# Patient Record
Sex: MALE | Race: OTHER | NOT HISPANIC OR LATINO
[De-identification: names, ages, dates, MRNs, and addresses within clinical notes are randomized per-mention and may not be internally consistent; named-entity substitution may affect disease eponyms.]

---

## 2017-03-30 ENCOUNTER — APPOINTMENT (OUTPATIENT)
Dept: OPHTHALMOLOGY | Facility: CLINIC | Age: 82
End: 2017-03-30

## 2017-03-30 DIAGNOSIS — Z79.899 OTHER LONG TERM (CURRENT) DRUG THERAPY: ICD-10-CM

## 2017-07-03 ENCOUNTER — APPOINTMENT (OUTPATIENT)
Dept: UROLOGY | Facility: CLINIC | Age: 82
End: 2017-07-03

## 2017-07-24 ENCOUNTER — APPOINTMENT (OUTPATIENT)
Dept: UROLOGY | Facility: CLINIC | Age: 82
End: 2017-07-24

## 2017-07-24 RX ORDER — APIXABAN 2.5 MG/1
2.5 TABLET, FILM COATED ORAL
Qty: 60 | Refills: 0 | Status: ACTIVE | COMMUNITY
Start: 2016-09-08

## 2017-07-26 LAB
APPEARANCE: CLEAR
BACTERIA UR CULT: ABNORMAL
BACTERIA: NEGATIVE
BILIRUBIN URINE: NEGATIVE
BLOOD URINE: NEGATIVE
COLOR: YELLOW
CORE LAB FLUID CYTOLOGY: NORMAL
GLUCOSE QUALITATIVE U: NORMAL
HYALINE CASTS: 0 /LPF
KETONES URINE: NEGATIVE
LEUKOCYTE ESTERASE URINE: NEGATIVE
MICROSCOPIC-UA: NORMAL
NITRITE URINE: NEGATIVE
PH URINE: 7
PROTEIN URINE: NEGATIVE
PSA SERPL-MCNC: 0.75 NG/ML
RED BLOOD CELLS URINE: 0 /HPF
SPECIFIC GRAVITY URINE: 1.01
SQUAMOUS EPITHELIAL CELLS: 2 /HPF
TESTOST SERPL-MCNC: 271.5 NG/DL
UROBILINOGEN URINE: 1
WHITE BLOOD CELLS URINE: 1 /HPF

## 2017-08-03 ENCOUNTER — RX RENEWAL (OUTPATIENT)
Age: 82
End: 2017-08-03

## 2017-08-24 ENCOUNTER — MEDICATION RENEWAL (OUTPATIENT)
Age: 82
End: 2017-08-24

## 2017-08-24 ENCOUNTER — RX RENEWAL (OUTPATIENT)
Age: 82
End: 2017-08-24

## 2017-09-21 ENCOUNTER — APPOINTMENT (OUTPATIENT)
Dept: OPHTHALMOLOGY | Facility: CLINIC | Age: 82
End: 2017-09-21

## 2017-10-23 ENCOUNTER — APPOINTMENT (OUTPATIENT)
Dept: UROLOGY | Facility: CLINIC | Age: 82
End: 2017-10-23

## 2017-11-21 ENCOUNTER — APPOINTMENT (OUTPATIENT)
Dept: UROLOGY | Facility: CLINIC | Age: 82
End: 2017-11-21
Payer: MEDICARE

## 2017-11-21 VITALS
RESPIRATION RATE: 16 BRPM | WEIGHT: 165 LBS | HEIGHT: 72 IN | DIASTOLIC BLOOD PRESSURE: 76 MMHG | HEART RATE: 64 BPM | TEMPERATURE: 98 F | SYSTOLIC BLOOD PRESSURE: 124 MMHG | BODY MASS INDEX: 22.35 KG/M2

## 2017-11-21 DIAGNOSIS — Z87.440 PERSONAL HISTORY OF URINARY (TRACT) INFECTIONS: ICD-10-CM

## 2017-11-21 PROCEDURE — 99214 OFFICE O/P EST MOD 30 MIN: CPT

## 2017-11-21 RX ORDER — PREDNISONE 1 MG/1
1 TABLET ORAL DAILY
Qty: 270 | Refills: 3 | Status: ACTIVE | COMMUNITY
Start: 2017-11-21

## 2017-11-24 LAB
APPEARANCE: ABNORMAL
BACTERIA UR CULT: NORMAL
BACTERIA: NEGATIVE
BILIRUBIN URINE: NEGATIVE
BLOOD URINE: NEGATIVE
COLOR: YELLOW
CORE LAB FLUID CYTOLOGY: NORMAL
GLUCOSE QUALITATIVE U: NEGATIVE MG/DL
HYALINE CASTS: 0 /LPF
KETONES URINE: NEGATIVE
LEUKOCYTE ESTERASE URINE: NEGATIVE
MICROSCOPIC-UA: NORMAL
NITRITE URINE: NEGATIVE
PH URINE: 6
PROTEIN URINE: NEGATIVE MG/DL
PSA SERPL-MCNC: 0.77 NG/ML
RED BLOOD CELLS URINE: 0 /HPF
SPECIFIC GRAVITY URINE: 1.02
SQUAMOUS EPITHELIAL CELLS: 1 /HPF
TESTOST SERPL-MCNC: 243.4 NG/DL
UROBILINOGEN URINE: 1 MG/DL
WHITE BLOOD CELLS URINE: 0 /HPF

## 2017-11-28 LAB
BASOPHILS # BLD AUTO: 0.02 K/UL
BASOPHILS NFR BLD AUTO: 0.3 %
EOSINOPHIL # BLD AUTO: 0.24 K/UL
EOSINOPHIL NFR BLD AUTO: 3.9 %
HCT VFR BLD CALC: 30.1 %
HGB BLD-MCNC: 10 G/DL
IMM GRANULOCYTES NFR BLD AUTO: 0.5 %
LYMPHOCYTES # BLD AUTO: 0.88 K/UL
LYMPHOCYTES NFR BLD AUTO: 14.2 %
MAN DIFF?: NORMAL
MCHC RBC-ENTMCNC: 29.9 PG
MCHC RBC-ENTMCNC: 33.2 GM/DL
MCV RBC AUTO: 90.1 FL
MONOCYTES # BLD AUTO: 0.66 K/UL
MONOCYTES NFR BLD AUTO: 10.7 %
NEUTROPHILS # BLD AUTO: 4.36 K/UL
NEUTROPHILS NFR BLD AUTO: 70.4 %
PLATELET # BLD AUTO: 201 K/UL
RBC # BLD: 3.34 M/UL
RBC # FLD: 14.9 %
WBC # FLD AUTO: 6.19 K/UL

## 2017-12-05 LAB
ALBUMIN SERPL ELPH-MCNC: 4 G/DL
ALP BLD-CCNC: 46 U/L
ALT SERPL-CCNC: 8 U/L
ANION GAP SERPL CALC-SCNC: 13 MMOL/L
AST SERPL-CCNC: 11 U/L
BILIRUB SERPL-MCNC: 0.6 MG/DL
BUN SERPL-MCNC: 21 MG/DL
CALCIUM SERPL-MCNC: 8.8 MG/DL
CHLORIDE SERPL-SCNC: 95 MMOL/L
CO2 SERPL-SCNC: 28 MMOL/L
CREAT SERPL-MCNC: 0.98 MG/DL
GLUCOSE SERPL-MCNC: 129 MG/DL
POTASSIUM SERPL-SCNC: 5 MMOL/L
PROT SERPL-MCNC: 6.4 G/DL
SODIUM SERPL-SCNC: 136 MMOL/L

## 2018-04-02 ENCOUNTER — LABORATORY RESULT (OUTPATIENT)
Age: 83
End: 2018-04-02

## 2018-04-02 ENCOUNTER — APPOINTMENT (OUTPATIENT)
Dept: NEPHROLOGY | Facility: CLINIC | Age: 83
End: 2018-04-02
Payer: MEDICARE

## 2018-04-02 VITALS
HEIGHT: 72 IN | SYSTOLIC BLOOD PRESSURE: 121 MMHG | BODY MASS INDEX: 23.3 KG/M2 | HEART RATE: 67 BPM | OXYGEN SATURATION: 98 % | DIASTOLIC BLOOD PRESSURE: 70 MMHG | WEIGHT: 172 LBS

## 2018-04-02 DIAGNOSIS — I10 ESSENTIAL (PRIMARY) HYPERTENSION: ICD-10-CM

## 2018-04-02 PROCEDURE — 99204 OFFICE O/P NEW MOD 45 MIN: CPT

## 2018-04-02 RX ORDER — LISINOPRIL 20 MG/1
20 TABLET ORAL
Qty: 90 | Refills: 0 | Status: ACTIVE | COMMUNITY
Start: 2017-11-17

## 2018-04-04 LAB
ALBUMIN SERPL ELPH-MCNC: 4.3 G/DL
ANA SER IF-ACNC: NEGATIVE
ANION GAP SERPL CALC-SCNC: 14 MMOL/L
APPEARANCE: CLEAR
BACTERIA: NEGATIVE
BASOPHILS # BLD AUTO: 0.01 K/UL
BASOPHILS NFR BLD AUTO: 0.2 %
BILIRUBIN URINE: NEGATIVE
BLOOD URINE: NEGATIVE
BUN SERPL-MCNC: 25 MG/DL
C3 SERPL-MCNC: 93 MG/DL
CALCIUM SERPL-MCNC: 9.2 MG/DL
CHLORIDE SERPL-SCNC: 97 MMOL/L
CO2 SERPL-SCNC: 25 MMOL/L
COLOR: YELLOW
CREAT SERPL-MCNC: 1.17 MG/DL
CREAT SPEC-SCNC: 59 MG/DL
CREAT/PROT UR: 0.1 RATIO
DEPRECATED KAPPA LC FREE/LAMBDA SER: 1.92 RATIO
DSDNA AB SER-ACNC: <12 IU/ML
EOSINOPHIL # BLD AUTO: 0.41 K/UL
EOSINOPHIL NFR BLD AUTO: 8.6 %
GLUCOSE QUALITATIVE U: NEGATIVE MG/DL
GLUCOSE SERPL-MCNC: 149 MG/DL
HBV SURFACE AB SER QL: REACTIVE
HBV SURFACE AG SER QL: NONREACTIVE
HCT VFR BLD CALC: 29.8 %
HCV AB SER QL: NONREACTIVE
HCV S/CO RATIO: 0.07 S/CO
HGB BLD-MCNC: 10 G/DL
HYALINE CASTS: 2 /LPF
IGA 24H UR QL IFE: NORMAL
IGA SER QL IEP: 109 MG/DL
IGG SER QL IEP: 561 MG/DL
IGM SER QL IEP: 84 MG/DL
IMM GRANULOCYTES NFR BLD AUTO: 0.4 %
KAPPA LC CSF-MCNC: 1.24 MG/DL
KAPPA LC SERPL-MCNC: 2.38 MG/DL
KETONES URINE: NEGATIVE
LEUKOCYTE ESTERASE URINE: NEGATIVE
LYMPHOCYTES # BLD AUTO: 1.32 K/UL
LYMPHOCYTES NFR BLD AUTO: 27.8 %
M PROTEIN SPEC IFE-MCNC: NORMAL
MAN DIFF?: NORMAL
MCHC RBC-ENTMCNC: 29.9 PG
MCHC RBC-ENTMCNC: 33.6 GM/DL
MCV RBC AUTO: 89 FL
MICROSCOPIC-UA: NORMAL
MONOCYTES # BLD AUTO: 0.54 K/UL
MONOCYTES NFR BLD AUTO: 11.4 %
MPO AB + PR3 PNL SER: NORMAL
NEUTROPHILS # BLD AUTO: 2.45 K/UL
NEUTROPHILS NFR BLD AUTO: 51.6 %
NITRITE URINE: NEGATIVE
PH URINE: 6
PHOSPHATE SERPL-MCNC: 3.8 MG/DL
PLATELET # BLD AUTO: 126 K/UL
POTASSIUM SERPL-SCNC: 5 MMOL/L
PROT UR-MCNC: 6 MG/DL
PROTEIN URINE: NEGATIVE MG/DL
RBC # BLD: 3.35 M/UL
RBC # FLD: 14.2 %
RED BLOOD CELLS URINE: 7 /HPF
SODIUM SERPL-SCNC: 136 MMOL/L
SPECIFIC GRAVITY URINE: 1.01
SQUAMOUS EPITHELIAL CELLS: 1 /HPF
TRIPLE PHOSPHATE CRYSTALS: ABNORMAL
UROBILINOGEN URINE: 1 MG/DL
WBC # FLD AUTO: 4.75 K/UL
WHITE BLOOD CELLS URINE: 1 /HPF

## 2018-04-12 ENCOUNTER — APPOINTMENT (OUTPATIENT)
Dept: UROLOGY | Facility: CLINIC | Age: 83
End: 2018-04-12

## 2018-05-03 ENCOUNTER — APPOINTMENT (OUTPATIENT)
Dept: NEPHROLOGY | Facility: CLINIC | Age: 83
End: 2018-05-03

## 2018-05-21 ENCOUNTER — APPOINTMENT (OUTPATIENT)
Dept: UROLOGY | Facility: CLINIC | Age: 83
End: 2018-05-21

## 2018-05-23 ENCOUNTER — APPOINTMENT (OUTPATIENT)
Dept: UROLOGY | Facility: CLINIC | Age: 83
End: 2018-05-23
Payer: MEDICARE

## 2018-05-23 PROCEDURE — 99214 OFFICE O/P EST MOD 30 MIN: CPT

## 2018-05-26 LAB
24R-OH-CALCIDIOL SERPL-MCNC: 61.4 PG/ML
25(OH)D3 SERPL-MCNC: 26.3 NG/ML
ALBUMIN SERPL ELPH-MCNC: 3.9 G/DL
ALP BLD-CCNC: 53 U/L
ALT SERPL-CCNC: 16 U/L
ANION GAP SERPL CALC-SCNC: 15 MMOL/L
APPEARANCE: CLEAR
AST SERPL-CCNC: 16 U/L
BACTERIA UR CULT: NORMAL
BACTERIA: NEGATIVE
BASOPHILS # BLD AUTO: 0.02 K/UL
BASOPHILS NFR BLD AUTO: 0.4 %
BILIRUB SERPL-MCNC: 0.5 MG/DL
BILIRUBIN URINE: NEGATIVE
BLOOD URINE: NEGATIVE
BUN SERPL-MCNC: 22 MG/DL
CALCIUM SERPL-MCNC: 8.5 MG/DL
CHLORIDE SERPL-SCNC: 90 MMOL/L
CO2 SERPL-SCNC: 23 MMOL/L
COLOR: YELLOW
CORE LAB FLUID CYTOLOGY: NORMAL
CREAT SERPL-MCNC: 1.63 MG/DL
EOSINOPHIL # BLD AUTO: 0.52 K/UL
EOSINOPHIL NFR BLD AUTO: 11.7 %
GLUCOSE QUALITATIVE U: NEGATIVE MG/DL
GLUCOSE SERPL-MCNC: 228 MG/DL
HBA1C MFR BLD HPLC: 6.2 %
HCT VFR BLD CALC: 28.1 %
HGB BLD-MCNC: 9.8 G/DL
HYALINE CASTS: 0 /LPF
IMM GRANULOCYTES NFR BLD AUTO: 0.4 %
KETONES URINE: NEGATIVE
LEUKOCYTE ESTERASE URINE: NEGATIVE
LYMPHOCYTES # BLD AUTO: 0.71 K/UL
LYMPHOCYTES NFR BLD AUTO: 16 %
MAN DIFF?: NORMAL
MCHC RBC-ENTMCNC: 30 PG
MCHC RBC-ENTMCNC: 34.9 GM/DL
MCV RBC AUTO: 85.9 FL
MICROSCOPIC-UA: NORMAL
MONOCYTES # BLD AUTO: 0.68 K/UL
MONOCYTES NFR BLD AUTO: 15.3 %
NEUTROPHILS # BLD AUTO: 2.5 K/UL
NEUTROPHILS NFR BLD AUTO: 56.2 %
NITRITE URINE: NEGATIVE
PH URINE: 6.5
PLATELET # BLD AUTO: 150 K/UL
POTASSIUM SERPL-SCNC: 4.5 MMOL/L
PROT SERPL-MCNC: 5.8 G/DL
PROTEIN URINE: NEGATIVE MG/DL
PSA SERPL-MCNC: 1.13 NG/ML
RBC # BLD: 3.27 M/UL
RBC # FLD: 14 %
RED BLOOD CELLS URINE: 1 /HPF
SODIUM SERPL-SCNC: 128 MMOL/L
SPECIFIC GRAVITY URINE: 1.01
SQUAMOUS EPITHELIAL CELLS: 1 /HPF
TESTOST SERPL-MCNC: 164.5 NG/DL
UROBILINOGEN URINE: 1 MG/DL
WBC # FLD AUTO: 4.45 K/UL
WHITE BLOOD CELLS URINE: 0 /HPF

## 2018-06-06 ENCOUNTER — APPOINTMENT (OUTPATIENT)
Dept: UROLOGY | Facility: CLINIC | Age: 83
End: 2018-06-06
Payer: MEDICARE

## 2018-06-06 DIAGNOSIS — E55.9 VITAMIN D DEFICIENCY, UNSPECIFIED: ICD-10-CM

## 2018-06-06 PROCEDURE — 99214 OFFICE O/P EST MOD 30 MIN: CPT

## 2018-06-06 RX ORDER — SILODOSIN 8 MG/1
8 CAPSULE ORAL DAILY
Qty: 90 | Refills: 3 | Status: COMPLETED | COMMUNITY
Start: 2017-07-03 | End: 2018-06-06

## 2018-06-09 LAB
APPEARANCE: CLEAR
BACTERIA UR CULT: ABNORMAL
BACTERIA: NEGATIVE
BILIRUBIN URINE: NEGATIVE
BLOOD URINE: NEGATIVE
CALCIUM OXALATE CRYSTALS: NEGATIVE
COLOR: YELLOW
CORE LAB FLUID CYTOLOGY: NORMAL
GLUCOSE QUALITATIVE U: NEGATIVE MG/DL
GRANULAR CASTS: 0 /LPF
HYALINE CASTS: 0 /LPF
KETONES URINE: NEGATIVE
LEUKOCYTE ESTERASE URINE: NEGATIVE
MICROSCOPIC-UA: NORMAL
NITRITE URINE: NEGATIVE
PH URINE: 7
PROTEIN URINE: NEGATIVE MG/DL
RED BLOOD CELLS URINE: 0 /HPF
SPECIFIC GRAVITY URINE: 1.01
SQUAMOUS EPITHELIAL CELLS: 1 /HPF
TRIPLE PHOSPHATE CRYSTALS: NEGATIVE
URIC ACID CRYSTALS: NEGATIVE
UROBILINOGEN URINE: 1 MG/DL
WHITE BLOOD CELLS URINE: 1 /HPF

## 2018-06-26 ENCOUNTER — APPOINTMENT (OUTPATIENT)
Dept: UROLOGY | Facility: CLINIC | Age: 83
End: 2018-06-26

## 2018-07-03 ENCOUNTER — APPOINTMENT (OUTPATIENT)
Dept: UROLOGY | Facility: CLINIC | Age: 83
End: 2018-07-03
Payer: MEDICARE

## 2018-07-03 PROCEDURE — 99214 OFFICE O/P EST MOD 30 MIN: CPT

## 2018-07-03 RX ORDER — ALFUZOSIN HYDROCHLORIDE 10 MG/1
10 TABLET, EXTENDED RELEASE ORAL
Qty: 30 | Refills: 11 | Status: COMPLETED | COMMUNITY
Start: 2018-05-23 | End: 2018-07-03

## 2018-07-04 LAB
APPEARANCE: CLEAR
BACTERIA: NEGATIVE
BILIRUBIN URINE: NEGATIVE
BLOOD URINE: NEGATIVE
COLOR: YELLOW
GLUCOSE QUALITATIVE U: NEGATIVE MG/DL
HYALINE CASTS: 0 /LPF
KETONES URINE: NEGATIVE
LEUKOCYTE ESTERASE URINE: NEGATIVE
MICROSCOPIC-UA: NORMAL
NITRITE URINE: NEGATIVE
PH URINE: 6
PROTEIN URINE: NEGATIVE MG/DL
RED BLOOD CELLS URINE: 0 /HPF
SPECIFIC GRAVITY URINE: 1.02
SQUAMOUS EPITHELIAL CELLS: 2 /HPF
UROBILINOGEN URINE: 1 MG/DL
WHITE BLOOD CELLS URINE: 1 /HPF

## 2018-07-06 LAB
BACTERIA UR CULT: NORMAL
CORE LAB FLUID CYTOLOGY: NORMAL

## 2018-07-31 ENCOUNTER — APPOINTMENT (OUTPATIENT)
Dept: UROLOGY | Facility: CLINIC | Age: 83
End: 2018-07-31
Payer: MEDICARE

## 2018-07-31 VITALS
HEIGHT: 72 IN | HEART RATE: 69 BPM | RESPIRATION RATE: 15 BRPM | WEIGHT: 175 LBS | DIASTOLIC BLOOD PRESSURE: 56 MMHG | BODY MASS INDEX: 23.7 KG/M2 | TEMPERATURE: 97.6 F | SYSTOLIC BLOOD PRESSURE: 124 MMHG

## 2018-07-31 PROCEDURE — 99214 OFFICE O/P EST MOD 30 MIN: CPT

## 2018-08-05 LAB
ALBUMIN SERPL ELPH-MCNC: 4.1 G/DL
ALP BLD-CCNC: 57 U/L
ALT SERPL-CCNC: 18 U/L
ANION GAP SERPL CALC-SCNC: 12 MMOL/L
APPEARANCE: CLEAR
AST SERPL-CCNC: 15 U/L
BACTERIA UR CULT: ABNORMAL
BACTERIA: NEGATIVE
BASOPHILS # BLD AUTO: 0.02 K/UL
BASOPHILS NFR BLD AUTO: 0.4 %
BILIRUB SERPL-MCNC: 0.4 MG/DL
BILIRUBIN URINE: NEGATIVE
BLOOD URINE: NEGATIVE
BUN SERPL-MCNC: 28 MG/DL
CALCIUM SERPL-MCNC: 8.8 MG/DL
CHLORIDE SERPL-SCNC: 95 MMOL/L
CO2 SERPL-SCNC: 27 MMOL/L
COLOR: YELLOW
CORE LAB FLUID CYTOLOGY: NORMAL
CREAT SERPL-MCNC: 1.36 MG/DL
EOSINOPHIL # BLD AUTO: 0.66 K/UL
EOSINOPHIL NFR BLD AUTO: 14 %
GLUCOSE QUALITATIVE U: NEGATIVE MG/DL
GLUCOSE SERPL-MCNC: 138 MG/DL
HCT VFR BLD CALC: 31.8 %
HGB BLD-MCNC: 10.5 G/DL
HYALINE CASTS: 0 /LPF
IMM GRANULOCYTES NFR BLD AUTO: 0.2 %
KETONES URINE: NEGATIVE
LEUKOCYTE ESTERASE URINE: NEGATIVE
LYMPHOCYTES # BLD AUTO: 1.49 K/UL
LYMPHOCYTES NFR BLD AUTO: 31.7 %
MAN DIFF?: NORMAL
MCHC RBC-ENTMCNC: 28.9 PG
MCHC RBC-ENTMCNC: 33 GM/DL
MCV RBC AUTO: 87.6 FL
MICROSCOPIC-UA: NORMAL
MONOCYTES # BLD AUTO: 0.54 K/UL
MONOCYTES NFR BLD AUTO: 11.5 %
NEUTROPHILS # BLD AUTO: 1.98 K/UL
NEUTROPHILS NFR BLD AUTO: 42.2 %
NITRITE URINE: NEGATIVE
PH URINE: 6
PLATELET # BLD AUTO: 140 K/UL
POTASSIUM SERPL-SCNC: 4.8 MMOL/L
PROT SERPL-MCNC: 5.7 G/DL
PROTEIN URINE: NEGATIVE MG/DL
PSA SERPL-MCNC: 0.49 NG/ML
RBC # BLD: 3.63 M/UL
RBC # FLD: 14.4 %
RED BLOOD CELLS URINE: 1 /HPF
SODIUM SERPL-SCNC: 134 MMOL/L
SPECIFIC GRAVITY URINE: 1.01
SQUAMOUS EPITHELIAL CELLS: 2 /HPF
TESTOST SERPL-MCNC: 156.5 NG/DL
UROBILINOGEN URINE: 1 MG/DL
WBC # FLD AUTO: 4.7 K/UL
WHITE BLOOD CELLS URINE: 0 /HPF

## 2018-08-13 ENCOUNTER — FORM ENCOUNTER (OUTPATIENT)
Age: 83
End: 2018-08-13

## 2018-08-14 ENCOUNTER — APPOINTMENT (OUTPATIENT)
Dept: ULTRASOUND IMAGING | Facility: IMAGING CENTER | Age: 83
End: 2018-08-14
Payer: MEDICARE

## 2018-08-14 ENCOUNTER — APPOINTMENT (OUTPATIENT)
Dept: UROLOGY | Facility: CLINIC | Age: 83
End: 2018-08-14
Payer: MEDICARE

## 2018-08-14 ENCOUNTER — OUTPATIENT (OUTPATIENT)
Dept: OUTPATIENT SERVICES | Facility: HOSPITAL | Age: 83
LOS: 1 days | End: 2018-08-14
Payer: MEDICARE
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
## 2018-08-14 DIAGNOSIS — N28.9 DISORDER OF KIDNEY AND URETER, UNSPECIFIED: ICD-10-CM

## 2018-08-14 PROCEDURE — 99214 OFFICE O/P EST MOD 30 MIN: CPT

## 2018-08-14 PROCEDURE — 76770 US EXAM ABDO BACK WALL COMP: CPT

## 2018-08-14 PROCEDURE — 76770 US EXAM ABDO BACK WALL COMP: CPT | Mod: 26

## 2018-09-03 ENCOUNTER — MOBILE ON CALL (OUTPATIENT)
Age: 83
End: 2018-09-03

## 2018-09-03 LAB
ANION GAP SERPL CALC-SCNC: 15 MMOL/L
APPEARANCE: CLEAR
BACTERIA UR CULT: ABNORMAL
BACTERIA: NEGATIVE
BILIRUBIN URINE: NEGATIVE
BLOOD URINE: NEGATIVE
BUN SERPL-MCNC: 30 MG/DL
CALCIUM SERPL-MCNC: 8.9 MG/DL
CHLORIDE SERPL-SCNC: 92 MMOL/L
CO2 SERPL-SCNC: 25 MMOL/L
COLOR: YELLOW
CORE LAB FLUID CYTOLOGY: NORMAL
CREAT SERPL-MCNC: 1.29 MG/DL
GLUCOSE QUALITATIVE U: NEGATIVE MG/DL
GLUCOSE SERPL-MCNC: 164 MG/DL
HYALINE CASTS: 0 /LPF
KETONES URINE: NEGATIVE
LEUKOCYTE ESTERASE URINE: NEGATIVE
MICROSCOPIC-UA: NORMAL
NITRITE URINE: NEGATIVE
OSMOLALITY SERPL: 282 MOS/KG
PH URINE: 6
POTASSIUM SERPL-SCNC: 4.5 MMOL/L
PROTEIN URINE: NEGATIVE MG/DL
RED BLOOD CELLS URINE: 4 /HPF
SODIUM SERPL-SCNC: 132 MMOL/L
SPECIFIC GRAVITY URINE: 1.01
SQUAMOUS EPITHELIAL CELLS: 0 /HPF
UROBILINOGEN URINE: 1 MG/DL
WHITE BLOOD CELLS URINE: 0 /HPF

## 2018-11-05 ENCOUNTER — APPOINTMENT (OUTPATIENT)
Dept: UROLOGY | Facility: CLINIC | Age: 83
End: 2018-11-05

## 2018-12-24 ENCOUNTER — APPOINTMENT (OUTPATIENT)
Dept: UROLOGY | Facility: CLINIC | Age: 83
End: 2018-12-24
Payer: MEDICARE

## 2018-12-24 DIAGNOSIS — R39.9 UNSPECIFIED SYMPTOMS AND SIGNS INVOLVING THE GENITOURINARY SYSTEM: ICD-10-CM

## 2018-12-24 LAB
ALBUMIN SERPL ELPH-MCNC: 3.8 G/DL
ALP BLD-CCNC: 87 U/L
ALT SERPL-CCNC: 21 U/L
ANION GAP SERPL CALC-SCNC: 12 MMOL/L
APPEARANCE: CLEAR
AST SERPL-CCNC: 15 U/L
BACTERIA: NEGATIVE
BILIRUB SERPL-MCNC: 0.5 MG/DL
BILIRUBIN URINE: NEGATIVE
BLOOD URINE: NEGATIVE
BUN SERPL-MCNC: 28 MG/DL
CALCIUM SERPL-MCNC: 9 MG/DL
CHLORIDE SERPL-SCNC: 96 MMOL/L
CO2 SERPL-SCNC: 28 MMOL/L
COLOR: YELLOW
CREAT SERPL-MCNC: 1.48 MG/DL
GLUCOSE QUALITATIVE U: NEGATIVE MG/DL
GLUCOSE SERPL-MCNC: 218 MG/DL
HBA1C MFR BLD HPLC: 6.9 %
KETONES URINE: NEGATIVE
LEUKOCYTE ESTERASE URINE: NEGATIVE
MICROSCOPIC-UA: NORMAL
NITRITE URINE: NEGATIVE
PH URINE: 5.5
POTASSIUM SERPL-SCNC: 4 MMOL/L
PROT SERPL-MCNC: 5.3 G/DL
PROTEIN URINE: NEGATIVE MG/DL
RED BLOOD CELLS URINE: 6 /HPF
SODIUM SERPL-SCNC: 136 MMOL/L
SPECIFIC GRAVITY URINE: 1.01
SQUAMOUS EPITHELIAL CELLS: 1 /HPF
UROBILINOGEN URINE: NEGATIVE MG/DL
WHITE BLOOD CELLS URINE: 1 /HPF

## 2018-12-24 PROCEDURE — 99214 OFFICE O/P EST MOD 30 MIN: CPT

## 2018-12-24 RX ORDER — MIRABEGRON 25 MG/1
25 TABLET, FILM COATED, EXTENDED RELEASE ORAL
Qty: 30 | Refills: 11 | Status: COMPLETED | COMMUNITY
Start: 2018-06-13 | End: 2018-12-24

## 2018-12-24 RX ORDER — GLIPIZIDE 5 MG/1
5 TABLET ORAL TWICE DAILY
Refills: 0 | Status: ACTIVE | COMMUNITY
Start: 2016-08-19

## 2018-12-24 RX ORDER — TROSPIUM CHLORIDE 20 MG/1
20 TABLET, FILM COATED ORAL
Qty: 30 | Refills: 11 | Status: COMPLETED | COMMUNITY
Start: 2018-06-06 | End: 2018-12-24

## 2018-12-27 LAB
BACTERIA UR CULT: ABNORMAL
BASOPHILS # BLD AUTO: 0.02 K/UL
BASOPHILS NFR BLD AUTO: 0.4 %
EOSINOPHIL # BLD AUTO: 0.71 K/UL
EOSINOPHIL NFR BLD AUTO: 14.5 %
HCT VFR BLD CALC: 32.3 %
HGB BLD-MCNC: 10.1 G/DL
IMM GRANULOCYTES NFR BLD AUTO: 1 %
LYMPHOCYTES # BLD AUTO: 1.87 K/UL
LYMPHOCYTES NFR BLD AUTO: 38.1 %
MAN DIFF?: NORMAL
MCHC RBC-ENTMCNC: 28.9 PG
MCHC RBC-ENTMCNC: 31.3 GM/DL
MCV RBC AUTO: 92.6 FL
MONOCYTES # BLD AUTO: 0.86 K/UL
MONOCYTES NFR BLD AUTO: 17.5 %
NEUTROPHILS # BLD AUTO: 1.4 K/UL
NEUTROPHILS NFR BLD AUTO: 28.5 %
PLATELET # BLD AUTO: 119 K/UL
RBC # BLD: 3.49 M/UL
RBC # FLD: 15.7 %
WBC # FLD AUTO: 4.91 K/UL

## 2018-12-31 LAB — BACTERIA UR CULT: NORMAL

## 2019-01-13 LAB
APPEARANCE: CLEAR
BACTERIA: NEGATIVE
BILIRUBIN URINE: NEGATIVE
BLOOD URINE: NEGATIVE
CALCIUM OXALATE CRYSTALS: NEGATIVE
COLOR: YELLOW
GLUCOSE QUALITATIVE U: NEGATIVE MG/DL
GRANULAR CASTS: 0 /LPF
HYALINE CASTS: 0 /LPF
KETONES URINE: NEGATIVE
LEUKOCYTE ESTERASE URINE: NEGATIVE
MICROSCOPIC-UA: NORMAL
NITRITE URINE: NEGATIVE
PH URINE: 5.5
PROTEIN URINE: NEGATIVE MG/DL
RED BLOOD CELLS URINE: 0 /HPF
SPECIFIC GRAVITY URINE: 1.01
SQUAMOUS EPITHELIAL CELLS: 0 /HPF
TRIPLE PHOSPHATE CRYSTALS: NEGATIVE
URIC ACID CRYSTALS: NEGATIVE
UROBILINOGEN URINE: 1 MG/DL
WHITE BLOOD CELLS URINE: 0 /HPF

## 2019-05-21 ENCOUNTER — APPOINTMENT (OUTPATIENT)
Dept: UROLOGY | Facility: CLINIC | Age: 84
End: 2019-05-21

## 2019-06-02 ENCOUNTER — INPATIENT (INPATIENT)
Facility: HOSPITAL | Age: 84
LOS: 3 days | Discharge: HOME | DRG: 291 | End: 2019-06-06
Attending: INTERNAL MEDICINE | Admitting: INTERNAL MEDICINE
Payer: MEDICARE

## 2019-06-02 VITALS
DIASTOLIC BLOOD PRESSURE: 73 MMHG | RESPIRATION RATE: 20 BRPM | SYSTOLIC BLOOD PRESSURE: 118 MMHG | TEMPERATURE: 99 F | WEIGHT: 175.05 LBS | OXYGEN SATURATION: 96 % | HEART RATE: 60 BPM

## 2019-06-02 DIAGNOSIS — I50.9 HEART FAILURE, UNSPECIFIED: ICD-10-CM

## 2019-06-02 LAB
ALBUMIN SERPL ELPH-MCNC: 4.1 G/DL — SIGNIFICANT CHANGE UP (ref 3.3–5)
ALP SERPL-CCNC: 97 U/L — SIGNIFICANT CHANGE UP (ref 40–120)
ALT FLD-CCNC: 29 U/L — SIGNIFICANT CHANGE UP (ref 10–45)
ANION GAP SERPL CALC-SCNC: 13 MMOL/L — SIGNIFICANT CHANGE UP (ref 5–17)
APTT BLD: 32 SEC — SIGNIFICANT CHANGE UP (ref 27.5–36.3)
AST SERPL-CCNC: 26 U/L — SIGNIFICANT CHANGE UP (ref 10–40)
BASOPHILS # BLD AUTO: 0 K/UL — SIGNIFICANT CHANGE UP (ref 0–0.2)
BASOPHILS NFR BLD AUTO: 0 % — SIGNIFICANT CHANGE UP (ref 0–2)
BILIRUB SERPL-MCNC: 0.5 MG/DL — SIGNIFICANT CHANGE UP (ref 0.2–1.2)
BUN SERPL-MCNC: 23 MG/DL — SIGNIFICANT CHANGE UP (ref 7–23)
CALCIUM SERPL-MCNC: 9 MG/DL — SIGNIFICANT CHANGE UP (ref 8.4–10.5)
CHLORIDE SERPL-SCNC: 94 MMOL/L — LOW (ref 96–108)
CO2 SERPL-SCNC: 26 MMOL/L — SIGNIFICANT CHANGE UP (ref 22–31)
CREAT SERPL-MCNC: 1.25 MG/DL — SIGNIFICANT CHANGE UP (ref 0.5–1.3)
EOSINOPHIL # BLD AUTO: 0 K/UL — SIGNIFICANT CHANGE UP (ref 0–0.5)
EOSINOPHIL NFR BLD AUTO: 0.3 % — SIGNIFICANT CHANGE UP (ref 0–6)
GLUCOSE BLDC GLUCOMTR-MCNC: 311 MG/DL — HIGH (ref 70–99)
GLUCOSE SERPL-MCNC: 249 MG/DL — HIGH (ref 70–99)
HCT VFR BLD CALC: 28.5 % — LOW (ref 39–50)
HGB BLD-MCNC: 10.1 G/DL — LOW (ref 13–17)
INR BLD: 1.09 RATIO — SIGNIFICANT CHANGE UP (ref 0.88–1.16)
LDH SERPL L TO P-CCNC: 277 U/L — HIGH (ref 50–242)
LYMPHOCYTES # BLD AUTO: 0.4 K/UL — LOW (ref 1–3.3)
LYMPHOCYTES # BLD AUTO: 3.6 % — LOW (ref 13–44)
MAGNESIUM SERPL-MCNC: 1.3 MG/DL — LOW (ref 1.6–2.6)
MCHC RBC-ENTMCNC: 32.5 PG — SIGNIFICANT CHANGE UP (ref 27–34)
MCHC RBC-ENTMCNC: 35.6 GM/DL — SIGNIFICANT CHANGE UP (ref 32–36)
MCV RBC AUTO: 91.3 FL — SIGNIFICANT CHANGE UP (ref 80–100)
MONOCYTES # BLD AUTO: 0.7 K/UL — SIGNIFICANT CHANGE UP (ref 0–0.9)
MONOCYTES NFR BLD AUTO: 5.4 % — SIGNIFICANT CHANGE UP (ref 2–14)
NEUTROPHILS # BLD AUTO: 11 K/UL — HIGH (ref 1.8–7.4)
NEUTROPHILS NFR BLD AUTO: 90.6 % — HIGH (ref 43–77)
NT-PROBNP SERPL-SCNC: 3762 PG/ML — HIGH (ref 0–300)
PHOSPHATE SERPL-MCNC: 3.1 MG/DL — SIGNIFICANT CHANGE UP (ref 2.5–4.5)
PLATELET # BLD AUTO: 100 K/UL — LOW (ref 150–400)
POTASSIUM SERPL-MCNC: 4.1 MMOL/L — SIGNIFICANT CHANGE UP (ref 3.5–5.3)
POTASSIUM SERPL-SCNC: 4.1 MMOL/L — SIGNIFICANT CHANGE UP (ref 3.5–5.3)
PROT SERPL-MCNC: 6.2 G/DL — SIGNIFICANT CHANGE UP (ref 6–8.3)
PROTHROM AB SERPL-ACNC: 12.6 SEC — SIGNIFICANT CHANGE UP (ref 10–12.9)
RBC # BLD: 3.12 M/UL — LOW (ref 4.2–5.8)
RBC # FLD: 17.5 % — HIGH (ref 10.3–14.5)
SODIUM SERPL-SCNC: 133 MMOL/L — LOW (ref 135–145)
TROPONIN T, HIGH SENSITIVITY RESULT: 22 NG/L — SIGNIFICANT CHANGE UP (ref 0–51)
URATE SERPL-MCNC: 5.8 MG/DL — SIGNIFICANT CHANGE UP (ref 3.4–8.8)
WBC # BLD: 12.1 K/UL — HIGH (ref 3.8–10.5)
WBC # FLD AUTO: 12.1 K/UL — HIGH (ref 3.8–10.5)

## 2019-06-02 PROCEDURE — 71250 CT THORAX DX C-: CPT | Mod: 26

## 2019-06-02 PROCEDURE — 99285 EMERGENCY DEPT VISIT HI MDM: CPT

## 2019-06-02 PROCEDURE — 71045 X-RAY EXAM CHEST 1 VIEW: CPT | Mod: 26

## 2019-06-02 RX ORDER — METOPROLOL TARTRATE 50 MG
25 TABLET ORAL ONCE
Refills: 0 | Status: COMPLETED | OUTPATIENT
Start: 2019-06-02 | End: 2019-06-02

## 2019-06-02 RX ORDER — DEXTROSE 50 % IN WATER 50 %
25 SYRINGE (ML) INTRAVENOUS ONCE
Refills: 0 | Status: DISCONTINUED | OUTPATIENT
Start: 2019-06-02 | End: 2019-06-06

## 2019-06-02 RX ORDER — METOPROLOL TARTRATE 50 MG
25 TABLET ORAL DAILY
Refills: 0 | Status: DISCONTINUED | OUTPATIENT
Start: 2019-06-02 | End: 2019-06-03

## 2019-06-02 RX ORDER — INSULIN LISPRO 100/ML
VIAL (ML) SUBCUTANEOUS
Refills: 0 | Status: DISCONTINUED | OUTPATIENT
Start: 2019-06-02 | End: 2019-06-06

## 2019-06-02 RX ORDER — DEXTROSE 50 % IN WATER 50 %
15 SYRINGE (ML) INTRAVENOUS ONCE
Refills: 0 | Status: DISCONTINUED | OUTPATIENT
Start: 2019-06-02 | End: 2019-06-06

## 2019-06-02 RX ORDER — GLUCAGON INJECTION, SOLUTION 0.5 MG/.1ML
1 INJECTION, SOLUTION SUBCUTANEOUS ONCE
Refills: 0 | Status: DISCONTINUED | OUTPATIENT
Start: 2019-06-02 | End: 2019-06-06

## 2019-06-02 RX ORDER — APIXABAN 2.5 MG/1
2.5 TABLET, FILM COATED ORAL EVERY 12 HOURS
Refills: 0 | Status: DISCONTINUED | OUTPATIENT
Start: 2019-06-02 | End: 2019-06-05

## 2019-06-02 RX ORDER — SODIUM CHLORIDE 9 MG/ML
1000 INJECTION, SOLUTION INTRAVENOUS
Refills: 0 | Status: DISCONTINUED | OUTPATIENT
Start: 2019-06-02 | End: 2019-06-06

## 2019-06-02 RX ORDER — MAGNESIUM SULFATE 500 MG/ML
1 VIAL (ML) INJECTION ONCE
Refills: 0 | Status: COMPLETED | OUTPATIENT
Start: 2019-06-02 | End: 2019-06-02

## 2019-06-02 RX ORDER — DEXTROSE 50 % IN WATER 50 %
12.5 SYRINGE (ML) INTRAVENOUS ONCE
Refills: 0 | Status: DISCONTINUED | OUTPATIENT
Start: 2019-06-02 | End: 2019-06-06

## 2019-06-02 RX ORDER — FINASTERIDE 5 MG/1
1 TABLET, FILM COATED ORAL
Qty: 0 | Refills: 0 | DISCHARGE

## 2019-06-02 RX ORDER — FUROSEMIDE 40 MG
40 TABLET ORAL DAILY
Refills: 0 | Status: DISCONTINUED | OUTPATIENT
Start: 2019-06-02 | End: 2019-06-05

## 2019-06-02 RX ORDER — FUROSEMIDE 40 MG
40 TABLET ORAL ONCE
Refills: 0 | Status: COMPLETED | OUTPATIENT
Start: 2019-06-02 | End: 2019-06-02

## 2019-06-02 RX ADMIN — Medication 100 GRAM(S): at 20:31

## 2019-06-02 RX ADMIN — Medication 25 MILLIGRAM(S): at 23:57

## 2019-06-02 RX ADMIN — APIXABAN 2.5 MILLIGRAM(S): 2.5 TABLET, FILM COATED ORAL at 20:31

## 2019-06-02 RX ADMIN — Medication 40 MILLIGRAM(S): at 16:41

## 2019-06-02 RX ADMIN — Medication 25 MILLIGRAM(S): at 20:31

## 2019-06-02 NOTE — ED PROVIDER NOTE - PMH
Anemia    Atrial fibrillation    Benign prostatic hypertrophy    DM2 (diabetes mellitus, type 2)    GERD (gastroesophageal reflux disease)    HTN (hypertension)    Osteoporosis

## 2019-06-02 NOTE — H&P ADULT - HISTORY OF PRESENT ILLNESS
complaining of shortness of breath.	   84M w/ pmh       HTN,   HLD,    DM (glipizide),    lymphoma (dx earlier this yr, on chemo x 3 mo,      last dose last wk),   CKD,    AF (on eliquis) -      p/w exertional sob, bilat LE swelling x 1 wk.     Denies chest pain, abd pain, dysuria, hematuria.   No recent travel, medication change, illness, or hospitalization.     Onc: Mauri Rosas

## 2019-06-02 NOTE — ED ADULT NURSE NOTE - ED STAT RN HANDOFF DETAILS
report given to Clarissa LAWTON holding Nurse. pt is A&Ox3, on cardiac monitor. awaiting Bed placement

## 2019-06-02 NOTE — ED PROVIDER NOTE - PROGRESS NOTE DETAILS
Omid May PGY2: paged unattached; patient stable Omid May PGY2: d/w bania - agreed w/ plan and to admission Omid May PGY2: received card from pt's wife - oncologist is Dr. Mauri Rosas at [phone]

## 2019-06-02 NOTE — H&P ADULT - NSHPLABSRESULTS_GEN_ALL_CORE
LABS:                        10.1   12.1  )-----------( 100      ( 02 Jun 2019 14:19 )             28.5     06-02    133<L>  |  94<L>  |  23  ----------------------------<  249<H>  4.1   |  26  |  1.25    Ca    9.0      02 Jun 2019 14:19  Phos  3.1     06-02  Mg     1.3     06-02    TPro  6.2  /  Alb  4.1  /  TBili  0.5  /  DBili  x   /  AST  26  /  ALT  29  /  AlkPhos  97  06-02    PT/INR - ( 02 Jun 2019 14:19 )   PT: 12.6 sec;   INR: 1.09 ratio         PTT - ( 02 Jun 2019 14:19 )  PTT:32.0 sec

## 2019-06-02 NOTE — ED PROVIDER NOTE - OBJECTIVE STATEMENT
84M w/ pmh HTN, HLD, DM (glipizide), lymphoma (dx earlier this yr, on chemo x 3 mo, last dose last wk), CKD, AF (on eliquis) - p/w exertional sob, bilat LE swelling x 1 wk. 84M w/ pmh HTN, HLD, DM (glipizide), lymphoma (dx earlier this yr, on chemo x 3 mo, last dose last wk), CKD, AF (on eliquis) - p/w exertional sob, bilat LE swelling x 1 wk. Denies chest pain, abd pain, dysuria, hematuria. No recent travel, medication change, illness, or hospitalization.     PCP: Marvel Mccartney  Onc: Mauri Rosas

## 2019-06-02 NOTE — H&P ADULT - NSHPPHYSICALEXAM_GEN_ALL_CORE
PHYSICAL EXAMINATION:  Vital Signs Last 24 Hrs  T(C): 37.1 (02 Jun 2019 13:28), Max: 37.1 (02 Jun 2019 13:28)  T(F): 98.7 (02 Jun 2019 13:28), Max: 98.7 (02 Jun 2019 13:28)  HR: 93 (02 Jun 2019 16:45) (60 - 93)  BP: 127/68 (02 Jun 2019 16:45) (118/73 - 127/68)  BP(mean): --  RR: 24 (02 Jun 2019 16:45) (20 - 24)  SpO2: 96% (02 Jun 2019 16:45) (96% - 96%)  CAPILLARY BLOOD GLUCOSE            GENERAL: NAD, well-groomed,  HEAD:  atraumatic, normocephalic  EYES: sclera anicteric  ENMT: mucous membranes moist  NECK: supple, No JVD  CHEST/LUNG: clear to auscultation bilaterally;    no      rales   ,   no rhonchi,   HEART: normal S1, S2  ABDOMEN: BS+, soft, ND, NT   EXTREMITIES:    edema    b/l LEs  NEURO: awake, ,     moves all extremities  SKIN: no     rash

## 2019-06-02 NOTE — ED PROVIDER NOTE - CLINICAL SUMMARY MEDICAL DECISION MAKING FREE TEXT BOX
Attending Aleksander Osorio DO: 83 yo male hx of leukemia on chemo, last chemo last week, presents with 10lb eight gain, exertional SOB and B/L LE swelling for past couple of days. No hx of heart failure but has chronic kidney dysfunction. On exam, ab soft nt/nd, lungs clear, 4+ pitting edema b/l LE. Concern for new onset CHF vs acute kidney failure possibly related to tumor lysis. On eliquis making PE/dvt unlikely. Labs, ekg, UA, likely admit.

## 2019-06-02 NOTE — ED PROVIDER NOTE - PHYSICAL EXAMINATION
*GEN:   in no acute distress, AOx3  *EYES:   pupils equally round and reactive to light, extra-occular movements intact  *HEENT:   airway patent, moist mucosal membranes, full ROM neck  *CV:   regular rate and rhythm  *RESP:   clear to auscultation bilaterally, non-labored  *ABD:   soft, non-tender  *:   no cva/flank tenderness  *MSK:   no MSK tenderness or limited ROM  *SKIN:   dry, intact  *NEURO:   AOx3, no focal weakness or loss of sensation

## 2019-06-02 NOTE — H&P ADULT - ASSESSMENT
84  yr  w/ pmh        HTN,   HLD,    DM (glipizide),   CKD. . AF  on eliquis     lymphoma , dx earlier this yr, on chemo x  past 3 months,       last dose last wk,              p/w exertional sob, bilat LE swelling x 1 wk.  acute ? diastolic  chf     tele        echo/    card eval   lasix  iv     AF on eliquis  DM,  follow fs  cxr with cardiomegaly/ chf/  ?  pna  ct chest  pending 84  yr  w/ pmh        HTN,   HLD,    DM (glipizide),   CKD. . AF  on eliquis     lymphoma , dx earlier this yr, on chemo x  past 3 months,       last dose last wk,              p/w exertional sob, bilat LE swelling x 1 wk.  acute ? diastolic  chf     tele        echo/    card eval   lasix  iv     AF on eliquis  DM,  follow fs  cxr with cardiomegaly/ chf/  ?  pna  ct chest  pending  h/o lymphoma, with anemia,/ low platelets  from lymphoma/ chemo 84  yr  w/ pmh        HTN,   HLD,    DM (glipizide),   CKD. . AF  on eliquis     lymphoma , dx earlier this yr, on chemo x  past 3 months,       last dose last wk,              p/w exertional sob, bilat LE swelling x 1 wk.  acute ? diastolic  chf  troponin, normal/ elevated  bnp     tele/   echo/    card eval   lasix  iv     AF on eliquis  DM,  follow fs  cxr with cardiomegaly/ chf/  ?  pna  ct chest  pending  h/o lymphoma, with anemia,/ low platelets  from lymphoma/ chemo  on asa/ toprol  when sbp improves, then will add  lisinopril

## 2019-06-02 NOTE — ED ADULT NURSE NOTE - OBJECTIVE STATEMENT
84 year old male presents ambulatory to ED through waiting room with wife complaining of shortness of breath & bilateral lower extremity swelling x 7-10 weeks. History of Lymphoma, on chemo, afib, anemia, DM, GERD, PBH, HTN, CKD, HF on Lasix. 84 year old male presents ambulatory to ED through waiting room with wife complaining of shortness of breath & bilateral lower extremity swelling x 7-10 weeks. History of Lymphoma, on chemo, afib, anemia, DM, GERD, PBH, HTN, CKD, HF on Lasix. Patient states he has had worsening shortness of breath, LORENZANA & lethargy x 7-10 days. Has also had worsening lower extremity edema - called PMD who advised him to take his lasix twice a day which he did for two days however was "peeing all the time so he stopped" per the wife. Wife states he has been very tired and has had to rest in between even getting ready for his day. 4+ pitting edema bilateral ankles. +redness & swelling to left wrist which he believes is from a mosquito bite.

## 2019-06-02 NOTE — H&P ADULT - NSICDXPASTMEDICALHX_GEN_ALL_CORE_FT
PAST MEDICAL HISTORY:  Anemia     Atrial fibrillation     Benign prostatic hypertrophy     DM2 (diabetes mellitus, type 2)     GERD (gastroesophageal reflux disease)     HTN (hypertension)     Osteoporosis

## 2019-06-02 NOTE — CONSULT NOTE ADULT - SUBJECTIVE AND OBJECTIVE BOX
CHIEF COMPLAINT:Patient is a 84y old  Male who presents with a chief complaint of sob (02 Jun 2019 19:09)      HPI:  complaining of shortness of breath.	   84M w/ pmh       HTN,   HLD,    DM (glipizide),    lymphoma (dx earlier this yr, on chemo x 3 mo,      last dose last wk),   CKD,    AF (on eliquis) -      p/w exertional sob, bilat LE swelling x 1 wk.     Denies chest pain, abd pain, dysuria, hematuria.   No recent travel, medication change, illness, or hospitalization.   no chest pain,sob, + ruiz.      PAST MEDICAL & SURGICAL HISTORY:  Atrial fibrillation  Anemia  DM2 (diabetes mellitus, type 2)  Osteoporosis  GERD (gastroesophageal reflux disease)  Benign prostatic hypertrophy  HTN (hypertension)  No significant past surgical history      MEDICATIONS  (STANDING):  apixaban 2.5 milliGRAM(s) Oral every 12 hours  dextrose 5%. 1000 milliLiter(s) (50 mL/Hr) IV Continuous <Continuous>  dextrose 50% Injectable 12.5 Gram(s) IV Push once  dextrose 50% Injectable 25 Gram(s) IV Push once  dextrose 50% Injectable 25 Gram(s) IV Push once  furosemide   Injectable 40 milliGRAM(s) IV Push daily  insulin lispro (HumaLOG) corrective regimen sliding scale   SubCutaneous three times a day before meals  magnesium sulfate  IVPB 1 Gram(s) IV Intermittent once  metoprolol succinate ER 25 milliGRAM(s) Oral daily    MEDICATIONS  (PRN):  dextrose 40% Gel 15 Gram(s) Oral once PRN Blood Glucose LESS THAN 70 milliGRAM(s)/deciliter  glucagon  Injectable 1 milliGRAM(s) IntraMuscular once PRN Glucose LESS THAN 70 milligrams/deciliter      FAMILY HISTORY:      SOCIAL HISTORY:    [ ] Non-smoker  [ ] Smoker  [ ] Alcohol    Allergies    No Known Allergies    Intolerances    	    REVIEW OF SYSTEMS:  CONSTITUTIONAL: No fever, weight loss, or fatigue  EYES: No eye pain, visual disturbances, or discharge  ENT:  No difficulty hearing, tinnitus, vertigo; No sinus or throat pain  NECK: No pain or stiffness  RESPIRATORY: No cough, wheezing, chills or hemoptysis; + Shortness of Breath  CARDIOVASCULAR: No chest pain, palpitations, passing out, dizziness, + leg swelling  GASTROINTESTINAL: No abdominal or epigastric pain. No nausea, vomiting, or hematemesis; No diarrhea or constipation. No melena or hematochezia.  GENITOURINARY: No dysuria, frequency, hematuria, or incontinence  NEUROLOGICAL: No headaches, memory loss, loss of strength, numbness, or tremors  SKIN: No itching, burning, rashes, or lesions   LYMPH Nodes: No enlarged glands  ENDOCRINE: No heat or cold intolerance; No hair loss  MUSCULOSKELETAL: No joint pain or swelling; No muscle, back, or extremity pain  PSYCHIATRIC: No depression, anxiety, mood swings, or difficulty sleeping  HEME/LYMPH: No easy bruising, or bleeding gums  ALLERGY AND IMMUNOLOGIC: No hives or eczema	    [ ] All others negative	  [ ] Unable to obtain    PHYSICAL EXAM:  T(C): 36.9 (06-02-19 @ 20:01), Max: 37.1 (06-02-19 @ 13:28)  HR: 115 (06-02-19 @ 20:01) (60 - 120)  BP: 131/74 (06-02-19 @ 20:01) (118/73 - 131/74)  RR: 26 (06-02-19 @ 20:01) (20 - 28)  SpO2: 97% (06-02-19 @ 20:01) (96% - 97%)  Wt(kg): --  I&O's Summary      Appearance: Normal	  HEENT:   Normal oral mucosa, PERRL, EOMI	  Lymphatic: No lymphadenopathy  Cardiovascular: Normal S1 S2, No JVD,+ murmurs, No edema  Respiratory: rhonchi  Psychiatry: A & O x 3, Mood & affect appropriate  Gastrointestinal:  Soft, Non-tender, + BS	  Skin: No rashes, No ecchymoses, No cyanosis	  Neurologic: Non-focal  Extremities: Normal range of motion, No clubbing, cyanosis or edema  Vascular: Peripheral pulses palpable 2+ bilaterally    TELEMETRY: 	    ECG:  	  RADIOLOGY:  OTHER: 	  	  LABS:	 	    CARDIAC MARKERS:                              10.1   12.1  )-----------( 100      ( 02 Jun 2019 14:19 )             28.5     06-02    133<L>  |  94<L>  |  23  ----------------------------<  249<H>  4.1   |  26  |  1.25    Ca    9.0      02 Jun 2019 14:19  Phos  3.1     06-02  Mg     1.3     06-02    TPro  6.2  /  Alb  4.1  /  TBili  0.5  /  DBili  x   /  AST  26  /  ALT  29  /  AlkPhos  97  06-02    proBNP: Serum Pro-Brain Natriuretic Peptide: 3762 pg/mL (06-02 @ 14:19)    Lipid Profile:   HgA1c:   TSH:   PT/INR - ( 02 Jun 2019 14:19 )   PT: 12.6 sec;   INR: 1.09 ratio         PTT - ( 02 Jun 2019 14:19 )  PTT:32.0 sec    PREVIOUS DIAGNOSTIC TESTING:    < from: Xray Chest 1 View AP/PA (06.02.19 @ 15:19) >  Mild cardiomegaly.  Pulmonary vascular congestive changes and trace right pleural effusion.  Mid-right lung atelectasis and/or pneumonia, as above

## 2019-06-03 LAB
ANION GAP SERPL CALC-SCNC: 13 MMOL/L — SIGNIFICANT CHANGE UP (ref 5–17)
APPEARANCE UR: CLEAR — SIGNIFICANT CHANGE UP
BACTERIA # UR AUTO: NEGATIVE — SIGNIFICANT CHANGE UP
BILIRUB UR-MCNC: NEGATIVE — SIGNIFICANT CHANGE UP
BUN SERPL-MCNC: 21 MG/DL — SIGNIFICANT CHANGE UP (ref 7–23)
CALCIUM SERPL-MCNC: 9.2 MG/DL — SIGNIFICANT CHANGE UP (ref 8.4–10.5)
CHLORIDE SERPL-SCNC: 97 MMOL/L — SIGNIFICANT CHANGE UP (ref 96–108)
CO2 SERPL-SCNC: 27 MMOL/L — SIGNIFICANT CHANGE UP (ref 22–31)
COLOR SPEC: COLORLESS — SIGNIFICANT CHANGE UP
CREAT SERPL-MCNC: 1.22 MG/DL — SIGNIFICANT CHANGE UP (ref 0.5–1.3)
DIFF PNL FLD: NEGATIVE — SIGNIFICANT CHANGE UP
EPI CELLS # UR: 0 /HPF — SIGNIFICANT CHANGE UP
GLUCOSE BLDC GLUCOMTR-MCNC: 158 MG/DL — HIGH (ref 70–99)
GLUCOSE BLDC GLUCOMTR-MCNC: 165 MG/DL — HIGH (ref 70–99)
GLUCOSE BLDC GLUCOMTR-MCNC: 173 MG/DL — HIGH (ref 70–99)
GLUCOSE BLDC GLUCOMTR-MCNC: 271 MG/DL — HIGH (ref 70–99)
GLUCOSE BLDC GLUCOMTR-MCNC: 278 MG/DL — HIGH (ref 70–99)
GLUCOSE SERPL-MCNC: 140 MG/DL — HIGH (ref 70–99)
GLUCOSE UR QL: NEGATIVE — SIGNIFICANT CHANGE UP
HBA1C BLD-MCNC: 7.4 % — HIGH (ref 4–5.6)
HCT VFR BLD CALC: 27.8 % — LOW (ref 39–50)
HGB BLD-MCNC: 9.6 G/DL — LOW (ref 13–17)
HYALINE CASTS # UR AUTO: 1 /LPF — SIGNIFICANT CHANGE UP (ref 0–2)
KETONES UR-MCNC: NEGATIVE — SIGNIFICANT CHANGE UP
LEUKOCYTE ESTERASE UR-ACNC: NEGATIVE — SIGNIFICANT CHANGE UP
MCHC RBC-ENTMCNC: 31 PG — SIGNIFICANT CHANGE UP (ref 27–34)
MCHC RBC-ENTMCNC: 34.5 GM/DL — SIGNIFICANT CHANGE UP (ref 32–36)
MCV RBC AUTO: 89.7 FL — SIGNIFICANT CHANGE UP (ref 80–100)
NITRITE UR-MCNC: NEGATIVE — SIGNIFICANT CHANGE UP
PH UR: 7 — SIGNIFICANT CHANGE UP (ref 5–8)
PLATELET # BLD AUTO: 104 K/UL — LOW (ref 150–400)
POTASSIUM SERPL-MCNC: 4 MMOL/L — SIGNIFICANT CHANGE UP (ref 3.5–5.3)
POTASSIUM SERPL-SCNC: 4 MMOL/L — SIGNIFICANT CHANGE UP (ref 3.5–5.3)
PROT UR-MCNC: NEGATIVE — SIGNIFICANT CHANGE UP
RBC # BLD: 3.1 M/UL — LOW (ref 4.2–5.8)
RBC # FLD: 18 % — HIGH (ref 10.3–14.5)
RBC CASTS # UR COMP ASSIST: 0 /HPF — SIGNIFICANT CHANGE UP (ref 0–4)
SODIUM SERPL-SCNC: 137 MMOL/L — SIGNIFICANT CHANGE UP (ref 135–145)
SP GR SPEC: 1.01 — LOW (ref 1.01–1.02)
UROBILINOGEN FLD QL: NEGATIVE — SIGNIFICANT CHANGE UP
WBC # BLD: 11.67 K/UL — HIGH (ref 3.8–10.5)
WBC # FLD AUTO: 11.67 K/UL — HIGH (ref 3.8–10.5)
WBC UR QL: 0 /HPF — SIGNIFICANT CHANGE UP (ref 0–5)

## 2019-06-03 RX ORDER — METOPROLOL TARTRATE 50 MG
50 TABLET ORAL AT BEDTIME
Refills: 0 | Status: DISCONTINUED | OUTPATIENT
Start: 2019-06-03 | End: 2019-06-06

## 2019-06-03 RX ORDER — INSULIN GLARGINE 100 [IU]/ML
6 INJECTION, SOLUTION SUBCUTANEOUS AT BEDTIME
Refills: 0 | Status: DISCONTINUED | OUTPATIENT
Start: 2019-06-03 | End: 2019-06-06

## 2019-06-03 RX ORDER — LANOLIN ALCOHOL/MO/W.PET/CERES
3 CREAM (GRAM) TOPICAL ONCE
Refills: 0 | Status: COMPLETED | OUTPATIENT
Start: 2019-06-03 | End: 2019-06-03

## 2019-06-03 RX ORDER — LISINOPRIL 2.5 MG/1
2.5 TABLET ORAL DAILY
Refills: 0 | Status: DISCONTINUED | OUTPATIENT
Start: 2019-06-03 | End: 2019-06-06

## 2019-06-03 RX ORDER — FUROSEMIDE 40 MG
20 TABLET ORAL ONCE
Refills: 0 | Status: COMPLETED | OUTPATIENT
Start: 2019-06-03 | End: 2019-06-03

## 2019-06-03 RX ORDER — METOPROLOL TARTRATE 50 MG
100 TABLET ORAL DAILY
Refills: 0 | Status: DISCONTINUED | OUTPATIENT
Start: 2019-06-03 | End: 2019-06-06

## 2019-06-03 RX ADMIN — APIXABAN 2.5 MILLIGRAM(S): 2.5 TABLET, FILM COATED ORAL at 06:30

## 2019-06-03 RX ADMIN — Medication 3: at 16:47

## 2019-06-03 RX ADMIN — APIXABAN 2.5 MILLIGRAM(S): 2.5 TABLET, FILM COATED ORAL at 17:02

## 2019-06-03 RX ADMIN — Medication 100 MILLIGRAM(S): at 07:56

## 2019-06-03 RX ADMIN — Medication 50 MILLIGRAM(S): at 21:32

## 2019-06-03 RX ADMIN — Medication 40 MILLIGRAM(S): at 06:33

## 2019-06-03 RX ADMIN — INSULIN GLARGINE 6 UNIT(S): 100 INJECTION, SOLUTION SUBCUTANEOUS at 21:32

## 2019-06-03 RX ADMIN — Medication 20 MILLIGRAM(S): at 12:20

## 2019-06-03 RX ADMIN — Medication 3 MILLIGRAM(S): at 22:32

## 2019-06-03 RX ADMIN — LISINOPRIL 2.5 MILLIGRAM(S): 2.5 TABLET ORAL at 09:45

## 2019-06-03 NOTE — PROGRESS NOTE ADULT - SUBJECTIVE AND OBJECTIVE BOX
no  cp/  less sob    REVIEW OF SYSTEMS:  GEN: no fever,    no chills  RESP: no SOB,   no cough  CVS: no chest pain,   no palpitations  GI: no abdominal pain,   no nausea,   no vomiting,   no constipation,   no diarrhea  : no dysuria,   no frequency  NEURO: no headache,   no dizziness  PSYCH: no depression,   not anxious  Derm : no rash    MEDICATIONS  (STANDING):  apixaban 2.5 milliGRAM(s) Oral every 12 hours  dextrose 5%. 1000 milliLiter(s) (50 mL/Hr) IV Continuous <Continuous>  dextrose 50% Injectable 12.5 Gram(s) IV Push once  dextrose 50% Injectable 25 Gram(s) IV Push once  dextrose 50% Injectable 25 Gram(s) IV Push once  furosemide   Injectable 40 milliGRAM(s) IV Push daily  insulin lispro (HumaLOG) corrective regimen sliding scale   SubCutaneous three times a day before meals  metoprolol tartrate 100 milliGRAM(s) Oral daily  metoprolol tartrate 50 milliGRAM(s) Oral at bedtime    MEDICATIONS  (PRN):  dextrose 40% Gel 15 Gram(s) Oral once PRN Blood Glucose LESS THAN 70 milliGRAM(s)/deciliter  glucagon  Injectable 1 milliGRAM(s) IntraMuscular once PRN Glucose LESS THAN 70 milligrams/deciliter      Vital Signs Last 24 Hrs  T(C): 36.9 (03 Jun 2019 07:54), Max: 37.1 (02 Jun 2019 13:28)  T(F): 98.4 (03 Jun 2019 07:54), Max: 98.8 (02 Jun 2019 19:51)  HR: 117 (03 Jun 2019 07:54) (60 - 120)  BP: 113/72 (03 Jun 2019 07:54) (113/72 - 138/79)  BP(mean): 96 (02 Jun 2019 23:07) (96 - 96)  RR: 18 (03 Jun 2019 07:54) (18 - 28)  SpO2: 94% (03 Jun 2019 07:54) (94% - 97%)  CAPILLARY BLOOD GLUCOSE      POCT Blood Glucose.: 278 mg/dL (03 Jun 2019 00:33)  POCT Blood Glucose.: 311 mg/dL (02 Jun 2019 22:41)    I&O's Summary      PHYSICAL EXAM:  HEAD:  Atraumatic, Normocephalic  NECK: Supple, No   JVD  CHEST/LUNG:   no     rales,     no,    rhonchi  HEART: Regular rate and rhythm;         murmur  ABDOMEN: Soft, Nontender, ;   EXTREMITIES:     no   edema  NEUROLOGY:  alert    LABS:                        10.1   12.1  )-----------( 100      ( 02 Jun 2019 14:19 )             28.5     06-03    137  |  97  |  21  ----------------------------<  140<H>  4.0   |  27  |  1.22    Ca    9.2      03 Jun 2019 05:19  Phos  3.1     06-02  Mg     1.3     06-02    TPro  6.2  /  Alb  4.1  /  TBili  0.5  /  DBili  x   /  AST  26  /  ALT  29  /  AlkPhos  97  06-02    PT/INR - ( 02 Jun 2019 14:19 )   PT: 12.6 sec;   INR: 1.09 ratio         PTT - ( 02 Jun 2019 14:19 )  PTT:32.0 sec                        Consultant(s) Notes Reviewed:      Care Discussed with Consultants/Other Providers:

## 2019-06-03 NOTE — PROGRESS NOTE ADULT - ASSESSMENT
84  yr  w/ pmh        HTN,   HLD,    DM (glipizide),   CKD. . AF  on eliquis     lymphoma , dx earlier this yr, on chemo x  past 3 months,       last dose last wk,              p/w exertional sob, bilat LE swelling x 1 wk.  acute ? diastolic  chf  troponin, normal/ elevated  bnp       lasix  iv /   echo    AF on eliquis  DM,  follow fs  cxr with cardiomegaly/ chf/  ?  pna/      ct chest  pending  h/o lymphoma, with anemia,/ low platelets  from lymphoma/ chemo    tele, AF , on   asa/ Eliquis/  lopressor  bid/  lisinopril

## 2019-06-03 NOTE — PROGRESS NOTE ADULT - SUBJECTIVE AND OBJECTIVE BOX
CARDIOLOGY     PROGRESS  NOTE   ________________________________________________    CHIEF COMPLAINT:Patient is a 84y old  Male who presents with a chief complaint of sob (03 Jun 2019 08:40)  doing better.  	  REVIEW OF SYSTEMS:  CONSTITUTIONAL: No fever, weight loss, or fatigue  EYES: No eye pain, visual disturbances, or discharge  ENT:  No difficulty hearing, tinnitus, vertigo; No sinus or throat pain  NECK: No pain or stiffness  RESPIRATORY: No cough, wheezing, chills or hemoptysis; + Shortness of Breath  CARDIOVASCULAR: No chest pain, palpitations, passing out, dizziness, or leg swelling  GASTROINTESTINAL: No abdominal or epigastric pain. No nausea, vomiting, or hematemesis; No diarrhea or constipation. No melena or hematochezia.  GENITOURINARY: No dysuria, frequency, hematuria, or incontinence  NEUROLOGICAL: No headaches, memory loss, loss of strength, numbness, or tremors  SKIN: No itching, burning, rashes, or lesions   LYMPH Nodes: No enlarged glands  ENDOCRINE: No heat or cold intolerance; No hair loss  MUSCULOSKELETAL: No joint pain or swelling; No muscle, back, or extremity pain  PSYCHIATRIC: No depression, anxiety, mood swings, or difficulty sleeping  HEME/LYMPH: No easy bruising, or bleeding gums  ALLERGY AND IMMUNOLOGIC: No hives or eczema	    [ ] All others negative	  [ ] Unable to obtain    PHYSICAL EXAM:  T(C): 36.9 (06-03-19 @ 07:54), Max: 37.1 (06-02-19 @ 13:28)  HR: 117 (06-03-19 @ 07:54) (60 - 120)  BP: 113/72 (06-03-19 @ 07:54) (113/72 - 138/79)  RR: 18 (06-03-19 @ 07:54) (18 - 28)  SpO2: 94% (06-03-19 @ 07:54) (94% - 97%)  Wt(kg): --  I&O's Summary      Appearance: Normal	  HEENT:   Normal oral mucosa, PERRL, EOMI	  Lymphatic: No lymphadenopathy  Cardiovascular: Normal S1 S2, No JVD, + murmurs, + edema  Respiratory: decrease bs  Psychiatry: A & O x 3, Mood & affect appropriate  Gastrointestinal:  Soft, Non-tender, + BS	  Skin: No rashes, No ecchymoses, No cyanosis	  Neurologic: Non-focal  Extremities: Normal range of motion, No clubbing, cyanosis or edema  Vascular: Peripheral pulses palpable 2+ bilaterally    MEDICATIONS  (STANDING):  apixaban 2.5 milliGRAM(s) Oral every 12 hours  dextrose 5%. 1000 milliLiter(s) (50 mL/Hr) IV Continuous <Continuous>  dextrose 50% Injectable 12.5 Gram(s) IV Push once  dextrose 50% Injectable 25 Gram(s) IV Push once  dextrose 50% Injectable 25 Gram(s) IV Push once  furosemide   Injectable 40 milliGRAM(s) IV Push daily  insulin glargine Injectable (LANTUS) 6 Unit(s) SubCutaneous at bedtime  insulin lispro (HumaLOG) corrective regimen sliding scale   SubCutaneous three times a day before meals  lisinopril 2.5 milliGRAM(s) Oral daily  metoprolol tartrate 100 milliGRAM(s) Oral daily  metoprolol tartrate 50 milliGRAM(s) Oral at bedtime      TELEMETRY: joanie    ECG:  	  RADIOLOGY:  OTHER: 	  	  LABS:	 	    CARDIAC MARKERS:                                10.1   12.1  )-----------( 100      ( 02 Jun 2019 14:19 )             28.5     06-03    137  |  97  |  21  ----------------------------<  140<H>  4.0   |  27  |  1.22    Ca    9.2      03 Jun 2019 05:19  Phos  3.1     06-02  Mg     1.3     06-02    TPro  6.2  /  Alb  4.1  /  TBili  0.5  /  DBili  x   /  AST  26  /  ALT  29  /  AlkPhos  97  06-02    proBNP: Serum Pro-Brain Natriuretic Peptide: 3762 pg/mL (06-02 @ 14:19)    Lipid Profile:   HgA1c:   TSH:   PT/INR - ( 02 Jun 2019 14:19 )   PT: 12.6 sec;   INR: 1.09 ratio         PTT - ( 02 Jun 2019 14:19 )  PTT:32.0 sec      Assessment and plan  ---------------------------  a.fib with rvr  recent chf ?sec to chemo  continue iv lasix  f/u lytes  continue beta blocker  will adjust meds

## 2019-06-04 LAB
ANION GAP SERPL CALC-SCNC: 11 MMOL/L — SIGNIFICANT CHANGE UP (ref 5–17)
BUN SERPL-MCNC: 24 MG/DL — HIGH (ref 7–23)
CALCIUM SERPL-MCNC: 9.1 MG/DL — SIGNIFICANT CHANGE UP (ref 8.4–10.5)
CHLORIDE SERPL-SCNC: 93 MMOL/L — LOW (ref 96–108)
CO2 SERPL-SCNC: 28 MMOL/L — SIGNIFICANT CHANGE UP (ref 22–31)
CREAT SERPL-MCNC: 1.17 MG/DL — SIGNIFICANT CHANGE UP (ref 0.5–1.3)
CULTURE RESULTS: SIGNIFICANT CHANGE UP
GLUCOSE BLDC GLUCOMTR-MCNC: 152 MG/DL — HIGH (ref 70–99)
GLUCOSE BLDC GLUCOMTR-MCNC: 226 MG/DL — HIGH (ref 70–99)
GLUCOSE BLDC GLUCOMTR-MCNC: 229 MG/DL — HIGH (ref 70–99)
GLUCOSE BLDC GLUCOMTR-MCNC: 240 MG/DL — HIGH (ref 70–99)
GLUCOSE SERPL-MCNC: 160 MG/DL — HIGH (ref 70–99)
HCT VFR BLD CALC: 29.3 % — LOW (ref 39–50)
HGB BLD-MCNC: 10.3 G/DL — LOW (ref 13–17)
MAGNESIUM SERPL-MCNC: 1.6 MG/DL — SIGNIFICANT CHANGE UP (ref 1.6–2.6)
MCHC RBC-ENTMCNC: 31.9 PG — SIGNIFICANT CHANGE UP (ref 27–34)
MCHC RBC-ENTMCNC: 35.1 GM/DL — SIGNIFICANT CHANGE UP (ref 32–36)
MCV RBC AUTO: 90.9 FL — SIGNIFICANT CHANGE UP (ref 80–100)
PLATELET # BLD AUTO: 99 K/UL — LOW (ref 150–400)
POTASSIUM SERPL-MCNC: 3.6 MMOL/L — SIGNIFICANT CHANGE UP (ref 3.5–5.3)
POTASSIUM SERPL-SCNC: 3.6 MMOL/L — SIGNIFICANT CHANGE UP (ref 3.5–5.3)
RBC # BLD: 3.22 M/UL — LOW (ref 4.2–5.8)
RBC # FLD: 17.3 % — HIGH (ref 10.3–14.5)
SODIUM SERPL-SCNC: 132 MMOL/L — LOW (ref 135–145)
SPECIMEN SOURCE: SIGNIFICANT CHANGE UP
WBC # BLD: 6.5 K/UL — SIGNIFICANT CHANGE UP (ref 3.8–10.5)
WBC # FLD AUTO: 6.5 K/UL — SIGNIFICANT CHANGE UP (ref 3.8–10.5)

## 2019-06-04 RX ORDER — DIGOXIN 250 MCG
0.25 TABLET ORAL EVERY 6 HOURS
Refills: 0 | Status: COMPLETED | OUTPATIENT
Start: 2019-06-04 | End: 2019-06-04

## 2019-06-04 RX ORDER — LANOLIN ALCOHOL/MO/W.PET/CERES
3 CREAM (GRAM) TOPICAL AT BEDTIME
Refills: 0 | Status: COMPLETED | OUTPATIENT
Start: 2019-06-04 | End: 2019-06-04

## 2019-06-04 RX ORDER — MAGNESIUM SULFATE 500 MG/ML
2 VIAL (ML) INJECTION ONCE
Refills: 0 | Status: COMPLETED | OUTPATIENT
Start: 2019-06-04 | End: 2019-06-04

## 2019-06-04 RX ORDER — POTASSIUM CHLORIDE 20 MEQ
40 PACKET (EA) ORAL ONCE
Refills: 0 | Status: COMPLETED | OUTPATIENT
Start: 2019-06-04 | End: 2019-06-04

## 2019-06-04 RX ADMIN — APIXABAN 2.5 MILLIGRAM(S): 2.5 TABLET, FILM COATED ORAL at 05:35

## 2019-06-04 RX ADMIN — Medication 100 MILLIGRAM(S): at 03:59

## 2019-06-04 RX ADMIN — Medication 40 MILLIEQUIVALENT(S): at 05:35

## 2019-06-04 RX ADMIN — Medication 3 MILLIGRAM(S): at 21:17

## 2019-06-04 RX ADMIN — LISINOPRIL 2.5 MILLIGRAM(S): 2.5 TABLET ORAL at 05:35

## 2019-06-04 RX ADMIN — Medication 50 GRAM(S): at 05:35

## 2019-06-04 RX ADMIN — Medication 40 MILLIGRAM(S): at 05:35

## 2019-06-04 RX ADMIN — Medication 2: at 16:57

## 2019-06-04 RX ADMIN — Medication 0.25 MILLIGRAM(S): at 17:02

## 2019-06-04 RX ADMIN — Medication 1: at 08:18

## 2019-06-04 RX ADMIN — APIXABAN 2.5 MILLIGRAM(S): 2.5 TABLET, FILM COATED ORAL at 17:02

## 2019-06-04 RX ADMIN — Medication 50 MILLIGRAM(S): at 21:17

## 2019-06-04 RX ADMIN — Medication 2: at 12:30

## 2019-06-04 RX ADMIN — Medication 0.25 MILLIGRAM(S): at 11:29

## 2019-06-04 RX ADMIN — INSULIN GLARGINE 6 UNIT(S): 100 INJECTION, SOLUTION SUBCUTANEOUS at 21:17

## 2019-06-04 NOTE — PROGRESS NOTE ADULT - SUBJECTIVE AND OBJECTIVE BOX
CARDIOLOGY     PROGRESS  NOTE   ________________________________________________    CHIEF COMPLAINT:Patient is a 84y old  Male who presents with a chief complaint of sob (04 Jun 2019 07:42)  doing better.  	  REVIEW OF SYSTEMS:  CONSTITUTIONAL: No fever, weight loss, or fatigue  EYES: No eye pain, visual disturbances, or discharge  ENT:  No difficulty hearing, tinnitus, vertigo; No sinus or throat pain  NECK: No pain or stiffness  RESPIRATORY: No cough, wheezing, chills or hemoptysis; + Shortness of Breath  CARDIOVASCULAR: No chest pain, palpitations, passing out, dizziness, or leg swelling  GASTROINTESTINAL: No abdominal or epigastric pain. No nausea, vomiting, or hematemesis; No diarrhea or constipation. No melena or hematochezia.  GENITOURINARY: No dysuria, frequency, hematuria, or incontinence  NEUROLOGICAL: No headaches, memory loss, loss of strength, numbness, or tremors  SKIN: No itching, burning, rashes, or lesions   LYMPH Nodes: No enlarged glands  ENDOCRINE: No heat or cold intolerance; No hair loss  MUSCULOSKELETAL: No joint pain or swelling; No muscle, back, or extremity pain  PSYCHIATRIC: No depression, anxiety, mood swings, or difficulty sleeping  HEME/LYMPH: No easy bruising, or bleeding gums  ALLERGY AND IMMUNOLOGIC: No hives or eczema	    [ ] All others negative	  [ ] Unable to obtain    PHYSICAL EXAM:  T(C): 37.2 (06-04-19 @ 04:02), Max: 37.2 (06-04-19 @ 04:02)  HR: 103 (06-04-19 @ 04:02) (65 - 110)  BP: 111/63 (06-04-19 @ 04:02) (111/63 - 127/76)  RR: 18 (06-04-19 @ 04:02) (18 - 18)  SpO2: 93% (06-04-19 @ 04:02) (93% - 96%)  Wt(kg): --  I&O's Summary    03 Jun 2019 07:01  -  04 Jun 2019 07:00  --------------------------------------------------------  IN: 480 mL / OUT: 950 mL / NET: -470 mL    04 Jun 2019 07:01  -  04 Jun 2019 09:27  --------------------------------------------------------  IN: 0 mL / OUT: 800 mL / NET: -800 mL        Appearance: Normal	  HEENT:   Normal oral mucosa, PERRL, EOMI	  Lymphatic: No lymphadenopathy  Cardiovascular: Normal S1 S2, No JVD, + murmurs, + edema  Respiratory: rhonchi  Psychiatry: A & O x 3, Mood & affect appropriate  Gastrointestinal:  Soft, Non-tender, + BS	  Skin: No rashes, No ecchymoses, No cyanosis	  Neurologic: Non-focal  Extremities: Normal range of motion, No clubbing, cyanosis or edema  Vascular: Peripheral pulses palpable 2+ bilaterally    MEDICATIONS  (STANDING):  apixaban 2.5 milliGRAM(s) Oral every 12 hours  dextrose 5%. 1000 milliLiter(s) (50 mL/Hr) IV Continuous <Continuous>  dextrose 50% Injectable 12.5 Gram(s) IV Push once  dextrose 50% Injectable 25 Gram(s) IV Push once  dextrose 50% Injectable 25 Gram(s) IV Push once  furosemide   Injectable 40 milliGRAM(s) IV Push daily  insulin glargine Injectable (LANTUS) 6 Unit(s) SubCutaneous at bedtime  insulin lispro (HumaLOG) corrective regimen sliding scale   SubCutaneous three times a day before meals  lisinopril 2.5 milliGRAM(s) Oral daily  metoprolol tartrate 100 milliGRAM(s) Oral daily  metoprolol tartrate 50 milliGRAM(s) Oral at bedtime      TELEMETRY: 	    ECG:  	  RADIOLOGY:  OTHER: 	  	  LABS:	 	    CARDIAC MARKERS:                                10.3   6.5   )-----------( 99       ( 04 Jun 2019 04:11 )             29.3     06-04    132<L>  |  93<L>  |  24<H>  ----------------------------<  160<H>  3.6   |  28  |  1.17    Ca    9.1      04 Jun 2019 04:11  Phos  3.1     06-02  Mg     1.6     06-04    TPro  6.2  /  Alb  4.1  /  TBili  0.5  /  DBili  x   /  AST  26  /  ALT  29  /  AlkPhos  97  06-02    proBNP: Serum Pro-Brain Natriuretic Peptide: 3762 pg/mL (06-02 @ 14:19)    Lipid Profile:   HgA1c: Hemoglobin A1C, Whole Blood: 7.4 % (06-03 @ 08:15)    TSH:   PT/INR - ( 02 Jun 2019 14:19 )   PT: 12.6 sec;   INR: 1.09 ratio         PTT - ( 02 Jun 2019 14:19 )  PTT:32.0 sec      Assessment and plan  ---------------------------  chf acute  awaiting echo still, called  continue current  a.fib rate not controlled  ac  will adjust meds

## 2019-06-04 NOTE — PHYSICAL THERAPY INITIAL EVALUATION ADULT - DISCHARGE DISPOSITION, PT EVAL
Home with no skilled PT needs. Pt presents at baseline/previous level of function. Pt to be discharge from skilled PT services at this time, pt to be left on ambulation aide program to maintain independence. Pt aware and in agreement with discharge recommendation. Please reconsult as appropriate/if status changes. CAMACHO Bassett aware.

## 2019-06-04 NOTE — CONSULT NOTE ADULT - SUBJECTIVE AND OBJECTIVE BOX
HPI:  84 yrs old male admitted 6/2/19 with SOB.	  Has h/o DM, HTN, HLD and CKD, AF on eliquis  Seen for h/o  lymphoma (dx earlier this yr, on chemo x 3 mo, last dose last wk)    PAST MEDICAL & SURGICAL HISTORY:  Atrial fibrillation  Anemia  DM2 (diabetes mellitus, type 2)  Osteoporosis  GERD (gastroesophageal reflux disease)  Benign prostatic hypertrophy  HTN (hypertension)  No significant past surgical history    Meds:  apixaban 2.5 milliGRAM(s) Oral every 12 hours  dextrose 40% Gel 15 Gram(s) Oral once PRN Blood Glucose LESS THAN 70 milliGRAM(s)/deciliter  dextrose 5%. 1000 milliLiter(s) IV Continuous <Continuous>  dextrose 50% Injectable 12.5 Gram(s) IV Push once  dextrose 50% Injectable 25 Gram(s) IV Push once  dextrose 50% Injectable 25 Gram(s) IV Push once  digoxin  Injectable 0.25 milliGRAM(s) IV Push every 6 hours  furosemide   Injectable 40 milliGRAM(s) IV Push daily  glucagon  Injectable 1 milliGRAM(s) IntraMuscular once PRN Glucose LESS THAN 70 milligrams/deciliter  insulin glargine Injectable (LANTUS) 6 Unit(s) SubCutaneous at bedtime  insulin lispro (HumaLOG) corrective regimen sliding scale   SubCutaneous three times a day before meals  lisinopril 2.5 milliGRAM(s) Oral daily  metoprolol tartrate 100 milliGRAM(s) Oral daily  metoprolol tartrate 50 milliGRAM(s) Oral at bedtime    Labs:  CBC Full  -  ( 04 Jun 2019 04:11 )  WBC Count : 6.5 K/uL  Hemoglobin : 10.3 g/dL  Hematocrit : 29.3 %  Platelet Count - Automated : 99 K/uL  Mean Cell Volume : 90.9 fl  Mean Cell Hemoglobin : 31.9 pg  Mean Cell Hemoglobin Concentration : 35.1 gm/dL  Auto Neutrophil # : x  Auto Lymphocyte # : x  Auto Monocyte # : x  Auto Eosinophil # : x  Auto Basophil # : x  Auto Neutrophil % : x  Auto Lymphocyte % : x  Auto Monocyte % : x  Auto Eosinophil % : x  Auto Basophil % : x    06-04    132<L>  |  93<L>  |  24<H>  ----------------------------<  160<H>  3.6   |  28  |  1.17    Ca    9.1      04 Jun 2019 04:11  Phos  3.1     06-02  Mg     1.6     06-04    TPro  6.2  /  Alb  4.1  /  TBili  0.5  /  DBili  x   /  AST  26  /  ALT  29  /  AlkPhos  97  06-02      Radiology:   < from: CT Chest No Cont (06.02.19 @ 20:18) >  Small bilateral pleural effusions, left greater than right.    Interlobular septal thickening representing pulmonary edema.    Small pericardial effusion.    Coronary artery atherosclerotic disease.    < end of copied text >            ROS:  No pain, no fever  No lumps in neck or pain  No Sob or chest pain  No palpitations   No abdominal pain. No change in bowel habit. No blood in stools  No weakness in extremities  No leg swelling      Vital Signs Last 24 Hrs  T(C): 36.9 (04 Jun 2019 11:24), Max: 37.2 (04 Jun 2019 04:02)  T(F): 98.5 (04 Jun 2019 11:24), Max: 99 (04 Jun 2019 04:02)  HR: 90 (04 Jun 2019 11:24) (65 - 110)  BP: 112/66 (04 Jun 2019 11:24) (111/63 - 127/76)  BP(mean): --  RR: 18 (04 Jun 2019 11:24) (18 - 18)  SpO2: 97% (04 Jun 2019 11:24) (93% - 97%)    Physical Exam:  Patient comfortable  AXOX3  Neck supple, no LN's  Chest: bilateral breath sounds, no wheeze or rales  CVS: regular heart rate without murmur  Abdomen: soft, BS+, no massess or organomegaly  CNS: no gross deficit  Ext: no edema HPI:  84 yrs old male admitted 6/2/19 with SOB.	  Has h/o DM, HTN, HLD and CKD, AF on eliquis  Seen for h/o  lymphoma (dx earlier this yr, on chemo x 3 mo, last dose last wk)    PAST MEDICAL & SURGICAL HISTORY:  Atrial fibrillation  Anemia  DM2 (diabetes mellitus, type 2)  Osteoporosis  GERD (gastroesophageal reflux disease)  Benign prostatic hypertrophy  HTN (hypertension)  No significant past surgical history    Meds:  apixaban 2.5 milliGRAM(s) Oral every 12 hours  dextrose 40% Gel 15 Gram(s) Oral once PRN Blood Glucose LESS THAN 70 milliGRAM(s)/deciliter  dextrose 5%. 1000 milliLiter(s) IV Continuous <Continuous>  dextrose 50% Injectable 12.5 Gram(s) IV Push once  dextrose 50% Injectable 25 Gram(s) IV Push once  dextrose 50% Injectable 25 Gram(s) IV Push once  digoxin  Injectable 0.25 milliGRAM(s) IV Push every 6 hours  furosemide   Injectable 40 milliGRAM(s) IV Push daily  glucagon  Injectable 1 milliGRAM(s) IntraMuscular once PRN Glucose LESS THAN 70 milligrams/deciliter  insulin glargine Injectable (LANTUS) 6 Unit(s) SubCutaneous at bedtime  insulin lispro (HumaLOG) corrective regimen sliding scale   SubCutaneous three times a day before meals  lisinopril 2.5 milliGRAM(s) Oral daily  metoprolol tartrate 100 milliGRAM(s) Oral daily  metoprolol tartrate 50 milliGRAM(s) Oral at bedtime    Labs:  CBC Full  -  ( 04 Jun 2019 04:11 )  WBC Count : 6.5 K/uL  Hemoglobin : 10.3 g/dL  Hematocrit : 29.3 %  Platelet Count - Automated : 99 K/uL  Mean Cell Volume : 90.9 fl  Mean Cell Hemoglobin : 31.9 pg  Mean Cell Hemoglobin Concentration : 35.1 gm/dL  Auto Neutrophil # : x  Auto Lymphocyte # : x  Auto Monocyte # : x  Auto Eosinophil # : x  Auto Basophil # : x  Auto Neutrophil % : x  Auto Lymphocyte % : x  Auto Monocyte % : x  Auto Eosinophil % : x  Auto Basophil % : x    06-04    132<L>  |  93<L>  |  24<H>  ----------------------------<  160<H>  3.6   |  28  |  1.17    Ca    9.1      04 Jun 2019 04:11  Phos  3.1     06-02  Mg     1.6     06-04    TPro  6.2  /  Alb  4.1  /  TBili  0.5  /  DBili  x   /  AST  26  /  ALT  29  /  AlkPhos  97  06-02      Radiology:   < from: CT Chest No Cont (06.02.19 @ 20:18) >  Small bilateral pleural effusions, left greater than right.    Interlobular septal thickening representing pulmonary edema.    Small pericardial effusion.    Coronary artery atherosclerotic disease.    < end of copied text >            ROS:  No pain, no fever  No lumps in neck or pain   Sob better, no chest pain  No palpitations   No abdominal pain. No change in bowel habit. No blood in stools  No weakness in extremities  No leg swelling      Vital Signs Last 24 Hrs  T(C): 36.9 (04 Jun 2019 11:24), Max: 37.2 (04 Jun 2019 04:02)  T(F): 98.5 (04 Jun 2019 11:24), Max: 99 (04 Jun 2019 04:02)  HR: 90 (04 Jun 2019 11:24) (65 - 110)  BP: 112/66 (04 Jun 2019 11:24) (111/63 - 127/76)  BP(mean): --  RR: 18 (04 Jun 2019 11:24) (18 - 18)  SpO2: 97% (04 Jun 2019 11:24) (93% - 97%)    Physical Exam:  Patient comfortable  AXOX3  Neck supple, no LN's  Chest: bilateral breath sounds, no wheeze or rales  CVS: regular heart rate without murmur  Abdomen: soft, BS+, no massess or organomegaly  CNS: no gross deficit  Ext: no edema

## 2019-06-04 NOTE — PROGRESS NOTE ADULT - ASSESSMENT
84  yr  w/ pmh        HTN,   HLD,    DM (glipizide),   CKD. . AF  on eliquis     lymphoma , dx earlier this yr, on chemo x  past 3 months,       last dose last wk,              p/w exertional sob, bilat LE swelling x 1 wk.  acute ? diastolic  chf  troponin, normal/ elevated  bnp     lasix  iv      AF on eliquis  DM,  follow fs  cxr with cardiomegaly/ chf/  ?  pna/      ct chest,  no pna/  chf  h/o lymphoma, with anemia,/ low platelets  from lymphoma/ chemo    tele, AF , on   asa/ Eliquis/  lopressor  bid/  lisinopril  echo still pending     < from: CT Chest No Cont (06.02.19 @ 20:18) >  IMPRESSION:Small bilateral pleural effusions, left greater than right.  Interlobular septal thickening representing pulmonary edema.    Small pericarial effusion.  Coronary artery atherosclerotic disease.  < end of copied text >

## 2019-06-04 NOTE — CONSULT NOTE ADULT - ASSESSMENT
84M w/ pmh       HTN,   HLD,    DM (glipizide),    lymphoma (dx earlier this yr, on chemo x 3 mo, last dose last wk), CKD, AF (on eliquis) , p/w exertional sob, bilat LE swelling x 1 wk.  pt with increasing sob and LE edema with chest xray c/w with chf and pleural effusion.  tele  beta blocker for a.fib control  continue ac  echo  ct chest no contrast  increase beta blocker as tolerated  diuresis  observe i/o
84 yrs old male admitted 6/2/19 with SOB.	  Has h/o DM, HTN, HLD and CKD, AF on eliquis  Seen for h/o  lymphoma (dx earlier this yr, on chemo x 3 mo, last dose last wk)    Patient; s wife told me that her  was on EPO for a year, he was not responding eventually and bone marrow was done and he was dx with lymphoma.  He apparently had big spleen which had decreased now on chemo.   I called Dr. 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 No adriamycin. He is improving.   He gets growth factors post chemo.  At present his WBC and hb is acceptable.   Platelets are low but safe, may be from chemo or has low baseline. No contraindication to AC at present count    Continue management of CHF and Afib. Once d/c he will follow up with his oncologist for continued treatment. He has been responding to Rx and spleen has regressed.

## 2019-06-04 NOTE — CHART NOTE - NSCHARTNOTEFT_GEN_A_CORE
Patient is a 84y old  Male who presents with a chief complaint of sob (03 Jun 2019 08:53)  Informed by RN of noted burst of AFib with rates 150 bpm, while pt was asleep  Tele reviewed, and noted AFib rates 100-120 with a burst of what appears to be SVT @ 150 bpm, entire event lasted 9 sec.  Pt seen & examined at bedside, he denied having cp, sob, abdominal pain or palpitations     Vital Signs Last 24 Hrs  T(C): 37.2 (04 Jun 2019 04:02), Max: 37.2 (04 Jun 2019 04:02)  T(F): 99 (04 Jun 2019 04:02), Max: 99 (04 Jun 2019 04:02)  HR: 103 (04 Jun 2019 04:02) (65 - 118)  BP: 111/63 (04 Jun 2019 04:02) (111/63 - 127/76)  BP(mean): --  RR: 18 (04 Jun 2019 04:02) (18 - 20)  SpO2: 93% (04 Jun 2019 04:02) (93% - 96%)      Labs:                          9.6    11.67 )-----------( 104      ( 03 Jun 2019 08:15 )             27.8     06-03    137  |  97  |  21  ----------------------------<  140<H>  4.0   |  27  |  1.22    Ca    9.2      03 Jun 2019 05:19  Phos  3.1     06-02  Mg     1.3     06-02    TPro  6.2  /  Alb  4.1  /  TBili  0.5  /  DBili  x   /  AST  26  /  ALT  29  /  AlkPhos  97  06-02            Radiology:    Physical Exam:  General: WN/WD NAD  Neurology: A&Ox3, nonfocal, GRAHAM x 4  Head:  Normocephalic, atraumatic  Respiratory: CTA B/L  CV: Irregular rhythm, S1S2, no murmur  Abdominal: Soft, Non tender, non distended, + BS  MSK: 2+ matt. LE, + peripheral pulses, FROM all 4 extremity    Assessment & Plan:  HPI:  complaining of shortness of breath.	   84M w/ pmh       HTN,   HLD,    DM (glipizide),    lymphoma (dx earlier this yr, on chemo x 3 mo,      last dose last wk),   CKD,    AF (on eliquis) -      p/w exertional sob, bilat LE swelling x 1 wk.     Denies chest pain, abd pain, dysuria, hematuria.   No recent travel, medication change, illness, or hospitalization.     Onc: Mauri Rosas (02 Jun 2019 19:09)    Pt seen for noted burst of SVT and AFib with RVR, otherwise remains in AFib rates 100-110's  Pt remained asymptomatic; VSS    PLAN:  >Continue to monitor  >Send Stat CBC, BMP, Mg  >Maintain K > 4 Mg > 2  >Give Metoprolol dose as ordered this AM  >Will endorse to day team; Follow up per Attending

## 2019-06-04 NOTE — PHYSICAL THERAPY INITIAL EVALUATION ADULT - ADDITIONAL COMMENTS
Pt states he lives with spouse in a private home with 3-4 steps to enter and a flight of steps to bedroom. Pt states prior to admission being independent with all functional mobility and ADLs. Pt was driving and very active prior to admission.

## 2019-06-04 NOTE — PROGRESS NOTE ADULT - SUBJECTIVE AND OBJECTIVE BOX
no cp/sob  REVIEW OF SYSTEMS:  GEN: no fever,    no chills  RESP: no SOB,   no cough  CVS: no chest pain,   no palpitations  GI: no abdominal pain,   no nausea,   no vomiting,   no constipation,   no diarrhea  : no dysuria,   no frequency  NEURO: no headache,   no dizziness  PSYCH: no depression,   not anxious  Derm : no rash    MEDICATIONS  (STANDING):  apixaban 2.5 milliGRAM(s) Oral every 12 hours  dextrose 5%. 1000 milliLiter(s) (50 mL/Hr) IV Continuous <Continuous>  dextrose 50% Injectable 12.5 Gram(s) IV Push once  dextrose 50% Injectable 25 Gram(s) IV Push once  dextrose 50% Injectable 25 Gram(s) IV Push once  furosemide   Injectable 40 milliGRAM(s) IV Push daily  insulin glargine Injectable (LANTUS) 6 Unit(s) SubCutaneous at bedtime  insulin lispro (HumaLOG) corrective regimen sliding scale   SubCutaneous three times a day before meals  lisinopril 2.5 milliGRAM(s) Oral daily  metoprolol tartrate 100 milliGRAM(s) Oral daily  metoprolol tartrate 50 milliGRAM(s) Oral at bedtime    MEDICATIONS  (PRN):  dextrose 40% Gel 15 Gram(s) Oral once PRN Blood Glucose LESS THAN 70 milliGRAM(s)/deciliter  glucagon  Injectable 1 milliGRAM(s) IntraMuscular once PRN Glucose LESS THAN 70 milligrams/deciliter      Vital Signs Last 24 Hrs  T(C): 37.2 (2019 04:02), Max: 37.2 (2019 04:02)  T(F): 99 (2019 04:02), Max: 99 (2019 04:02)  HR: 103 (2019 04:02) (65 - 117)  BP: 111/63 (2019 04:02) (111/63 - 127/76)  BP(mean): --  RR: 18 (2019 04:02) (18 - 18)  SpO2: 93% (2019 04:02) (93% - 96%)  CAPILLARY BLOOD GLUCOSE      POCT Blood Glucose.: 165 mg/dL (2019 21:20)  POCT Blood Glucose.: 271 mg/dL (2019 16:28)  POCT Blood Glucose.: 173 mg/dL (2019 13:22)  POCT Blood Glucose.: 158 mg/dL (2019 09:04)    I&O's Summary    2019 07:01  -  2019 07:00  --------------------------------------------------------  IN: 480 mL / OUT: 950 mL / NET: -470 mL        PHYSICAL EXAM:  HEAD:  Atraumatic, Normocephalic  NECK: Supple, No   JVD  CHEST/LUNG:   no     rales,     no,    rhonchi  HEART: Regular rate and rhythm;         murmur  ABDOMEN: Soft, Nontender, ;   EXTREMITIES:    less    edema  NEUROLOGY:  alert    LABS:                        10.3   6.5   )-----------( 99       ( 2019 04:11 )             29.3     06-04    132<L>  |  93<L>  |  24<H>  ----------------------------<  160<H>  3.6   |  28  |  1.17    Ca    9.1      2019 04:11  Phos  3.1     06-02  Mg     1.6     06-04    TPro  6.2  /  Alb  4.1  /  TBili  0.5  /  DBili  x   /  AST  26  /  ALT  29  /  AlkPhos  97  06-02    PT/INR - ( 2019 14:19 )   PT: 12.6 sec;   INR: 1.09 ratio         PTT - ( 2019 14:19 )  PTT:32.0 sec      Urinalysis Basic - ( 2019 09:56 )    Color: Colorless / Appearance: Clear / S.007 / pH: x  Gluc: x / Ketone: Negative  / Bili: Negative / Urobili: Negative   Blood: x / Protein: Negative / Nitrite: Negative   Leuk Esterase: Negative / RBC: 0 /hpf / WBC 0 /HPF   Sq Epi: x / Non Sq Epi: 0 /hpf / Bacteria: Negative            Hemoglobin A1C, Whole Blood: 7.4 % ( @ 08:15)            Consultant(s) Notes Reviewed:      Care Discussed with Consultants/Other Providers:

## 2019-06-04 NOTE — PHYSICAL THERAPY INITIAL EVALUATION ADULT - PERTINENT HX OF CURRENT PROBLEM, REHAB EVAL
85 y/o M with PMH HTN, HLD, DM, lymphoma (dx earlier this yr, on chemo x3 months), CKD, Afib (on eliquis) p/w extertional SOB and b/l LE swelling. Pt a/w CHF and pleural effusions. Chest Xray: pulmonary vascular congestive changes and trace R pleural effusion. Mid-R lung atelectasis and/or PNA. CT Chest: small b/l pleural effusions, L>R. Interlobular septal thickening representing pulmonary edema.

## 2019-06-04 NOTE — PHYSICAL THERAPY INITIAL EVALUATION ADULT - GENERAL OBSERVATIONS, REHAB EVAL
Pt [facility] 35 min eval well. Pt agreed to session. Rec'd seating upright in bed, tele, spouse at bedside, NAD. PT reviewed cardiac precautions

## 2019-06-05 LAB
ANION GAP SERPL CALC-SCNC: 12 MMOL/L — SIGNIFICANT CHANGE UP (ref 5–17)
BUN SERPL-MCNC: 28 MG/DL — HIGH (ref 7–23)
CALCIUM SERPL-MCNC: 9.1 MG/DL — SIGNIFICANT CHANGE UP (ref 8.4–10.5)
CHLORIDE SERPL-SCNC: 96 MMOL/L — SIGNIFICANT CHANGE UP (ref 96–108)
CO2 SERPL-SCNC: 29 MMOL/L — SIGNIFICANT CHANGE UP (ref 22–31)
CREAT SERPL-MCNC: 1.18 MG/DL — SIGNIFICANT CHANGE UP (ref 0.5–1.3)
GLUCOSE BLDC GLUCOMTR-MCNC: 163 MG/DL — HIGH (ref 70–99)
GLUCOSE BLDC GLUCOMTR-MCNC: 178 MG/DL — HIGH (ref 70–99)
GLUCOSE BLDC GLUCOMTR-MCNC: 219 MG/DL — HIGH (ref 70–99)
GLUCOSE BLDC GLUCOMTR-MCNC: 279 MG/DL — HIGH (ref 70–99)
GLUCOSE SERPL-MCNC: 168 MG/DL — HIGH (ref 70–99)
HCT VFR BLD CALC: 30.2 % — LOW (ref 39–50)
HGB BLD-MCNC: 10.6 G/DL — LOW (ref 13–17)
MCHC RBC-ENTMCNC: 32.3 PG — SIGNIFICANT CHANGE UP (ref 27–34)
MCHC RBC-ENTMCNC: 35.1 GM/DL — SIGNIFICANT CHANGE UP (ref 32–36)
MCV RBC AUTO: 92 FL — SIGNIFICANT CHANGE UP (ref 80–100)
PLATELET # BLD AUTO: 92 K/UL — LOW (ref 150–400)
POTASSIUM SERPL-MCNC: 3.9 MMOL/L — SIGNIFICANT CHANGE UP (ref 3.5–5.3)
POTASSIUM SERPL-SCNC: 3.9 MMOL/L — SIGNIFICANT CHANGE UP (ref 3.5–5.3)
RBC # BLD: 3.29 M/UL — LOW (ref 4.2–5.8)
RBC # FLD: 17.3 % — HIGH (ref 10.3–14.5)
SODIUM SERPL-SCNC: 137 MMOL/L — SIGNIFICANT CHANGE UP (ref 135–145)
WBC # BLD: 5.6 K/UL — SIGNIFICANT CHANGE UP (ref 3.8–10.5)
WBC # FLD AUTO: 5.6 K/UL — SIGNIFICANT CHANGE UP (ref 3.8–10.5)

## 2019-06-05 PROCEDURE — 93306 TTE W/DOPPLER COMPLETE: CPT | Mod: 26

## 2019-06-05 RX ORDER — LANOLIN ALCOHOL/MO/W.PET/CERES
3 CREAM (GRAM) TOPICAL ONCE
Refills: 0 | Status: COMPLETED | OUTPATIENT
Start: 2019-06-05 | End: 2019-06-05

## 2019-06-05 RX ORDER — FUROSEMIDE 40 MG
40 TABLET ORAL DAILY
Refills: 0 | Status: DISCONTINUED | OUTPATIENT
Start: 2019-06-06 | End: 2019-06-06

## 2019-06-05 RX ORDER — APIXABAN 2.5 MG/1
5 TABLET, FILM COATED ORAL EVERY 12 HOURS
Refills: 0 | Status: DISCONTINUED | OUTPATIENT
Start: 2019-06-05 | End: 2019-06-06

## 2019-06-05 RX ADMIN — Medication 40 MILLIGRAM(S): at 05:46

## 2019-06-05 RX ADMIN — APIXABAN 5 MILLIGRAM(S): 2.5 TABLET, FILM COATED ORAL at 18:02

## 2019-06-05 RX ADMIN — APIXABAN 2.5 MILLIGRAM(S): 2.5 TABLET, FILM COATED ORAL at 05:45

## 2019-06-05 RX ADMIN — Medication 1: at 08:30

## 2019-06-05 RX ADMIN — Medication 3 MILLIGRAM(S): at 22:12

## 2019-06-05 RX ADMIN — Medication 100 MILLIGRAM(S): at 05:45

## 2019-06-05 RX ADMIN — Medication 3: at 13:20

## 2019-06-05 RX ADMIN — Medication 2: at 18:02

## 2019-06-05 RX ADMIN — LISINOPRIL 2.5 MILLIGRAM(S): 2.5 TABLET ORAL at 05:45

## 2019-06-05 RX ADMIN — INSULIN GLARGINE 6 UNIT(S): 100 INJECTION, SOLUTION SUBCUTANEOUS at 21:52

## 2019-06-05 RX ADMIN — Medication 50 MILLIGRAM(S): at 21:52

## 2019-06-05 NOTE — PROGRESS NOTE ADULT - ASSESSMENT
84 yrs old male admitted 6/2/19 with SOB.	  PMH includes AF on eliquis  Seen for h/o  lymphoma (dx earlier this yr, on chemo x 3 mo, last dose last wk)  He had marginal zone lymphoma and is s/p 3 cycles of bendamustine and Rituxan. No adriamycin. He is improving including with shrinkage of spleen   He gets growth factors post chemo.  At present his WBC and hb is acceptable.   Platelets are low but in safe, may be from chemo or has low baseline. No contraindication to AC at present count  He will follow for chemo once d/c with Dr. Rosas

## 2019-06-05 NOTE — PROGRESS NOTE ADULT - ASSESSMENT
84  yr  w/ pmh        HTN,   HLD,    DM (glipizide),   CKD. . AF  on eliquis     lymphoma , dx earlier this yr, on chemo x  past 3 months,       last dose last wk,              p/w exertional sob, bilat LE swelling x 1 wk.  acute ? diastolic  chf  troponin, normal/ elevated  bnp     lasix  iv      AF on eliquis  DM,  follow fs  cxr with cardiomegaly/ chf/  ?  pna/      ct chest,  no pna/  chf  h/o lymphoma, with anemia,/ low platelets  from lymphoma/ chemo. seen by wyatt stevenson, AF , on   asa/ Eliquis/  lopressor  bid/  lisinopril  echo still pending     < from: CT Chest No Cont (06.02.19 @ 20:18) >  IMPRESSION:Small bilateral pleural effusions, left greater than right.  Interlobular septal thickening representing pulmonary edema.    Small pericarial effusion.  Coronary artery atherosclerotic disease.  < end of copied text >

## 2019-06-05 NOTE — PROGRESS NOTE ADULT - SUBJECTIVE AND OBJECTIVE BOX
84 yrs old male admitted 6/2/19 with SOB.	  Has h/o DM, HTN, HLD and CKD, AF on eliquis  Seen for h/o  lymphoma (dx earlier this yr, on chemo x 3 mo, last dose last wk)      PAST MEDICAL & SURGICAL HISTORY:  Atrial fibrillation  Anemia  DM2 (diabetes mellitus, type 2)  Osteoporosis  GERD (gastroesophageal reflux disease)  Benign prostatic hypertrophy  HTN (hypertension)  No significant past surgical history    Medications:  apixaban 5 milliGRAM(s) Oral every 12 hours  dextrose 40% Gel 15 Gram(s) Oral once PRN Blood Glucose LESS THAN 70 milliGRAM(s)/deciliter  dextrose 5%. 1000 milliLiter(s) IV Continuous <Continuous>  dextrose 50% Injectable 12.5 Gram(s) IV Push once  dextrose 50% Injectable 25 Gram(s) IV Push once  dextrose 50% Injectable 25 Gram(s) IV Push once  furosemide   Injectable 40 milliGRAM(s) IV Push daily  glucagon  Injectable 1 milliGRAM(s) IntraMuscular once PRN Glucose LESS THAN 70 milligrams/deciliter  insulin glargine Injectable (LANTUS) 6 Unit(s) SubCutaneous at bedtime  insulin lispro (HumaLOG) corrective regimen sliding scale   SubCutaneous three times a day before meals  lisinopril 2.5 milliGRAM(s) Oral daily  metoprolol tartrate 100 milliGRAM(s) Oral daily  metoprolol tartrate 50 milliGRAM(s) Oral at bedtime    Labs:  CBC Full  -  ( 05 Jun 2019 07:04 )  WBC Count : 5.6 K/uL  Hemoglobin : 10.6 g/dL  Hematocrit : 30.2 %  Platelet Count - Automated : 92 K/uL  Mean Cell Volume : 92.0 fl  Mean Cell Hemoglobin : 32.3 pg  Mean Cell Hemoglobin Concentration : 35.1 gm/dL  Auto Neutrophil # : x  Auto Lymphocyte # : x  Auto Monocyte # : x  Auto Eosinophil # : x  Auto Basophil # : x  Auto Neutrophil % : x  Auto Lymphocyte % : x  Auto Monocyte % : x  Auto Eosinophil % : x  Auto Basophil % : x    06-05    137  |  96  |  28<H>  ----------------------------<  168<H>  3.9   |  29  |  1.18    Ca    9.1      05 Jun 2019 07:04  Mg     1.6     06-04        Radiology:             ROS:  Patient comfortable without distress  No SOB or chest pain  No palpitation  No abdominal pain, diarrhaea or constipation  No weakness of extremities  No skin changes or swelling of legs    Vital Signs Last 24 Hrs  T(C): 36.9 (05 Jun 2019 04:09), Max: 36.9 (04 Jun 2019 11:24)  T(F): 98.4 (05 Jun 2019 04:09), Max: 98.5 (04 Jun 2019 11:24)  HR: 56 (05 Jun 2019 04:09) (56 - 110)  BP: 117/60 (05 Jun 2019 04:09) (112/66 - 145/74)  BP(mean): --  RR: 18 (05 Jun 2019 04:09) (18 - 18)  SpO2: 94% (05 Jun 2019 04:09) (94% - 97%)    Physical exam:  Patient alert and oriented  No distress  CVS: S1, S2 regular or murmur  Chest: bilateral breath sound without rales  Abdomen: soft, not tender, no organomegaly or masses  No focal neuro deficit  No edema      Assessment and Plan:

## 2019-06-05 NOTE — PROGRESS NOTE ADULT - SUBJECTIVE AND OBJECTIVE BOX
no cp/sob    REVIEW OF SYSTEMS:  GEN: no fever,    no chills  RESP: no SOB,   no cough  CVS: no chest pain,   no palpitations  GI: no abdominal pain,   no nausea,   no vomiting,   no constipation,   no diarrhea  : no dysuria,   no frequency  NEURO: no headache,   no dizziness  PSYCH: no depression,   not anxious  Derm : no rash    MEDICATIONS  (STANDING):  apixaban 2.5 milliGRAM(s) Oral every 12 hours  dextrose 5%. 1000 milliLiter(s) (50 mL/Hr) IV Continuous <Continuous>  dextrose 50% Injectable 12.5 Gram(s) IV Push once  dextrose 50% Injectable 25 Gram(s) IV Push once  dextrose 50% Injectable 25 Gram(s) IV Push once  furosemide   Injectable 40 milliGRAM(s) IV Push daily  insulin glargine Injectable (LANTUS) 6 Unit(s) SubCutaneous at bedtime  insulin lispro (HumaLOG) corrective regimen sliding scale   SubCutaneous three times a day before meals  lisinopril 2.5 milliGRAM(s) Oral daily  metoprolol tartrate 100 milliGRAM(s) Oral daily  metoprolol tartrate 50 milliGRAM(s) Oral at bedtime    MEDICATIONS  (PRN):  dextrose 40% Gel 15 Gram(s) Oral once PRN Blood Glucose LESS THAN 70 milliGRAM(s)/deciliter  glucagon  Injectable 1 milliGRAM(s) IntraMuscular once PRN Glucose LESS THAN 70 milligrams/deciliter      Vital Signs Last 24 Hrs  T(C): 36.9 (2019 04:09), Max: 36.9 (2019 11:24)  T(F): 98.4 (2019 04:09), Max: 98.5 (2019 11:24)  HR: 56 (2019 04:09) (56 - 110)  BP: 117/60 (2019 04:09) (112/66 - 145/74)  BP(mean): --  RR: 18 (2019 04:09) (18 - 18)  SpO2: 94% (2019 04:09) (94% - 97%)  CAPILLARY BLOOD GLUCOSE      POCT Blood Glucose.: 163 mg/dL (2019 07:41)  POCT Blood Glucose.: 240 mg/dL (2019 21:08)  POCT Blood Glucose.: 229 mg/dL (2019 16:28)  POCT Blood Glucose.: 226 mg/dL (2019 12:08)    I&O's Summary    2019 07:01  -  2019 07:00  --------------------------------------------------------  IN: 480 mL / OUT: 2850 mL / NET: -2370 mL        PHYSICAL EXAM:  HEAD:  Atraumatic, Normocephalic  NECK: Supple, No   JVD  CHEST/LUNG:   no     rales,     no,    rhonchi  HEART: Regular rate and rhythm;         murmur  ABDOMEN: Soft, Nontender, ;   EXTREMITIES:     less   edema  NEUROLOGY:  alert    LABS:                        10.6   5.6   )-----------( 92       ( 2019 07:04 )             30.2     06-    137  |  96  |  28<H>  ----------------------------<  168<H>  3.9   |  29  |  1.18    Ca    9.1      2019 07:04  Mg     1.6     06-04            Urinalysis Basic - ( 2019 09:56 )    Color: Colorless / Appearance: Clear / S.007 / pH: x  Gluc: x / Ketone: Negative  / Bili: Negative / Urobili: Negative   Blood: x / Protein: Negative / Nitrite: Negative   Leuk Esterase: Negative / RBC: 0 /hpf / WBC 0 /HPF   Sq Epi: x / Non Sq Epi: 0 /hpf / Bacteria: Negative            Hemoglobin A1C, Whole Blood: 7.4 % ( @ 08:15)            Consultant(s) Notes Reviewed:      Care Discussed with Consultants/Other Providers:

## 2019-06-05 NOTE — PROGRESS NOTE ADULT - SUBJECTIVE AND OBJECTIVE BOX
CARDIOLOGY     PROGRESS  NOTE   ________________________________________________    CHIEF COMPLAINT:Patient is a 84y old  Male who presents with a chief complaint of sob (05 Jun 2019 08:24)  doing better.  	  REVIEW OF SYSTEMS:  CONSTITUTIONAL: No fever, weight loss, or fatigue  EYES: No eye pain, visual disturbances, or discharge  ENT:  No difficulty hearing, tinnitus, vertigo; No sinus or throat pain  NECK: No pain or stiffness  RESPIRATORY: No cough, wheezing, chills or hemoptysis; No Shortness of Breath  CARDIOVASCULAR: No chest pain, palpitations, passing out, dizziness, or leg swelling  GASTROINTESTINAL: No abdominal or epigastric pain. No nausea, vomiting, or hematemesis; No diarrhea or constipation. No melena or hematochezia.  GENITOURINARY: No dysuria, frequency, hematuria, or incontinence  NEUROLOGICAL: No headaches, memory loss, loss of strength, numbness, or tremors  SKIN: No itching, burning, rashes, or lesions   LYMPH Nodes: No enlarged glands  ENDOCRINE: No heat or cold intolerance; No hair loss  MUSCULOSKELETAL: No joint pain or swelling; No muscle, back, or extremity pain  PSYCHIATRIC: No depression, anxiety, mood swings, or difficulty sleeping  HEME/LYMPH: No easy bruising, or bleeding gums  ALLERGY AND IMMUNOLOGIC: No hives or eczema	    [ ] All others negative	  [ ] Unable to obtain    PHYSICAL EXAM:  T(C): 36.9 (06-05-19 @ 04:09), Max: 36.9 (06-04-19 @ 11:24)  HR: 56 (06-05-19 @ 04:09) (56 - 110)  BP: 117/60 (06-05-19 @ 04:09) (112/66 - 145/74)  RR: 18 (06-05-19 @ 04:09) (18 - 18)  SpO2: 94% (06-05-19 @ 04:09) (94% - 97%)  Wt(kg): --  I&O's Summary    04 Jun 2019 07:01  -  05 Jun 2019 07:00  --------------------------------------------------------  IN: 480 mL / OUT: 2850 mL / NET: -2370 mL        Appearance: Normal	  HEENT:   Normal oral mucosa, PERRL, EOMI	  Lymphatic: No lymphadenopathy  Cardiovascular: Normal S1 S2, No JVD, +murmurs, + decrease  edema  Respiratory: Lungs clear to auscultation	  Psychiatry: A & O x 3, Mood & affect appropriate  Gastrointestinal:  Soft, Non-tender, + BS	  Skin: No rashes, No ecchymoses, No cyanosis	  Neurologic: Non-focal  Extremities: Normal range of motion, No clubbing, cyanosis or edema  Vascular: Peripheral pulses palpable 2+ bilaterally    MEDICATIONS  (STANDING):  apixaban 2.5 milliGRAM(s) Oral every 12 hours  dextrose 5%. 1000 milliLiter(s) (50 mL/Hr) IV Continuous <Continuous>  dextrose 50% Injectable 12.5 Gram(s) IV Push once  dextrose 50% Injectable 25 Gram(s) IV Push once  dextrose 50% Injectable 25 Gram(s) IV Push once  furosemide   Injectable 40 milliGRAM(s) IV Push daily  insulin glargine Injectable (LANTUS) 6 Unit(s) SubCutaneous at bedtime  insulin lispro (HumaLOG) corrective regimen sliding scale   SubCutaneous three times a day before meals  lisinopril 2.5 milliGRAM(s) Oral daily  metoprolol tartrate 100 milliGRAM(s) Oral daily  metoprolol tartrate 50 milliGRAM(s) Oral at bedtime      TELEMETRY: 	    ECG:  	  RADIOLOGY:  OTHER: 	  	  LABS:	 	    CARDIAC MARKERS:                                10.6   5.6   )-----------( 92       ( 05 Jun 2019 07:04 )             30.2     06-05    137  |  96  |  28<H>  ----------------------------<  168<H>  3.9   |  29  |  1.18    Ca    9.1      05 Jun 2019 07:04  Mg     1.6     06-04      proBNP: Serum Pro-Brain Natriuretic Peptide: 3762 pg/mL (06-02 @ 14:19)    Lipid Profile:   HgA1c: Hemoglobin A1C, Whole Blood: 7.4 % (06-03 @ 08:15)    TSH:         Assessment and plan  ---------------------------  chf/a,fib  continue current meds  still awaiting echo called yesterday  a.fib hr is controlled  ac

## 2019-06-06 ENCOUNTER — TRANSCRIPTION ENCOUNTER (OUTPATIENT)
Age: 84
End: 2019-06-06

## 2019-06-06 VITALS
OXYGEN SATURATION: 96 % | RESPIRATION RATE: 18 BRPM | DIASTOLIC BLOOD PRESSURE: 80 MMHG | SYSTOLIC BLOOD PRESSURE: 122 MMHG | TEMPERATURE: 98 F | HEART RATE: 91 BPM

## 2019-06-06 LAB
GLUCOSE BLDC GLUCOMTR-MCNC: 132 MG/DL — HIGH (ref 70–99)
GLUCOSE BLDC GLUCOMTR-MCNC: 220 MG/DL — HIGH (ref 70–99)

## 2019-06-06 PROCEDURE — 85730 THROMBOPLASTIN TIME PARTIAL: CPT

## 2019-06-06 PROCEDURE — 97161 PT EVAL LOW COMPLEX 20 MIN: CPT

## 2019-06-06 PROCEDURE — 80053 COMPREHEN METABOLIC PANEL: CPT

## 2019-06-06 PROCEDURE — 80048 BASIC METABOLIC PNL TOTAL CA: CPT

## 2019-06-06 PROCEDURE — 85027 COMPLETE CBC AUTOMATED: CPT

## 2019-06-06 PROCEDURE — 83880 ASSAY OF NATRIURETIC PEPTIDE: CPT

## 2019-06-06 PROCEDURE — 84550 ASSAY OF BLOOD/URIC ACID: CPT

## 2019-06-06 PROCEDURE — 93306 TTE W/DOPPLER COMPLETE: CPT

## 2019-06-06 PROCEDURE — 83615 LACTATE (LD) (LDH) ENZYME: CPT

## 2019-06-06 PROCEDURE — 96374 THER/PROPH/DIAG INJ IV PUSH: CPT

## 2019-06-06 PROCEDURE — 84484 ASSAY OF TROPONIN QUANT: CPT

## 2019-06-06 PROCEDURE — 83735 ASSAY OF MAGNESIUM: CPT

## 2019-06-06 PROCEDURE — 71045 X-RAY EXAM CHEST 1 VIEW: CPT

## 2019-06-06 PROCEDURE — 84100 ASSAY OF PHOSPHORUS: CPT

## 2019-06-06 PROCEDURE — 87086 URINE CULTURE/COLONY COUNT: CPT

## 2019-06-06 PROCEDURE — 82962 GLUCOSE BLOOD TEST: CPT

## 2019-06-06 PROCEDURE — 83036 HEMOGLOBIN GLYCOSYLATED A1C: CPT

## 2019-06-06 PROCEDURE — 93005 ELECTROCARDIOGRAM TRACING: CPT

## 2019-06-06 PROCEDURE — 85610 PROTHROMBIN TIME: CPT

## 2019-06-06 PROCEDURE — 81001 URINALYSIS AUTO W/SCOPE: CPT

## 2019-06-06 PROCEDURE — 99285 EMERGENCY DEPT VISIT HI MDM: CPT | Mod: 25

## 2019-06-06 PROCEDURE — 71250 CT THORAX DX C-: CPT

## 2019-06-06 RX ORDER — APIXABAN 2.5 MG/1
1 TABLET, FILM COATED ORAL
Qty: 60 | Refills: 0
Start: 2019-06-06 | End: 2019-07-05

## 2019-06-06 RX ORDER — LISINOPRIL 2.5 MG/1
1 TABLET ORAL
Qty: 0 | Refills: 0 | DISCHARGE

## 2019-06-06 RX ORDER — FUROSEMIDE 40 MG
1 TABLET ORAL
Qty: 15 | Refills: 0
Start: 2019-06-06 | End: 2019-07-05

## 2019-06-06 RX ORDER — APIXABAN 2.5 MG/1
1 TABLET, FILM COATED ORAL
Qty: 0 | Refills: 0 | DISCHARGE

## 2019-06-06 RX ORDER — LISINOPRIL 2.5 MG/1
1 TABLET ORAL
Qty: 30 | Refills: 0
Start: 2019-06-06 | End: 2019-07-05

## 2019-06-06 RX ORDER — FUROSEMIDE 40 MG
1 TABLET ORAL
Qty: 0 | Refills: 0 | DISCHARGE

## 2019-06-06 RX ADMIN — Medication 100 MILLIGRAM(S): at 05:12

## 2019-06-06 RX ADMIN — APIXABAN 5 MILLIGRAM(S): 2.5 TABLET, FILM COATED ORAL at 05:12

## 2019-06-06 RX ADMIN — LISINOPRIL 2.5 MILLIGRAM(S): 2.5 TABLET ORAL at 05:12

## 2019-06-06 RX ADMIN — Medication 40 MILLIGRAM(S): at 05:12

## 2019-06-06 NOTE — CHART NOTE - NSCHARTNOTEFT_GEN_A_CORE
Pt medically cleared for discharge by Dr. Qiu and Dr. Rose. Medication reviewed with Dr. Qiu - lasix 20mg and 40 mg alternating every other day. Continue all other medication, no glipizide as per Dr. Rose. Pt to follow up with pMD in 1 week and Dr. Qiu in 2 weeks.

## 2019-06-06 NOTE — DISCHARGE NOTE PROVIDER - NSDCCPCAREPLAN_GEN_ALL_CORE_FT
PRINCIPAL DISCHARGE DIAGNOSIS  Diagnosis: CHF (congestive heart failure)  Assessment and Plan of Treatment: Weigh yourself daily.  If you gain 3lbs in 3 days, or 5lbs in a week call your Health Care Provider.  Do not eat or drink foods containing more than 2000mg of salt (sodium) in your diet every day.  Call your Health Care Provider if you have any swelling or increased swelling in your feet, ankles, and/or stomach.  Take all of your medication as directed.  If you become dizzy call your Health Care Provider.        SECONDARY DISCHARGE DIAGNOSES  Diagnosis: Diabetes mellitus  Assessment and Plan of Treatment: HgA1C this admission.  Make sure you get your HgA1c checked every three months.  If you take oral diabetes medications, check your blood glucose two times a day.  If you take insulin, check your blood glucose before meals and at bedtime.  It's important not to skip any meals.  Keep a log of your blood glucose results and always take it with you to your doctor appointments.  Keep a list of your current medications including injectables and over the counter medications and bring this medication list with you to all your doctor appointments.  If you have not seen your ophthalmologist this year call for appointment.  Check your feet daily for redness, sores, or openings. Do not self treat. If no improvement in two days call your primary care physician for an appointment.  Low blood sugar (hypoglycemia) is a blood sugar below 70mg/dl. Check your blood sugar if you feel signs/symptoms of hypoglycemia. If your blood sugar is below 70 take 15 grams of carbohydrates (ex 4 oz of apple juice, 3-4 glucose tablets, or 4-6 oz of regular soda) wait 15 minutes and repeat blood sugar to make sure it comes up above 70.  If your blood sugar is above 70 and you are due for a meal, have a meal.  If you are not due for a meal have a snack.  This snack helps keeps your blood sugar at a safe range.      Diagnosis: Atrial fibrillation  Assessment and Plan of Treatment: Atrial fibrillation is the most common heart rhythm problem.  The condition puts you at risk for has stroke and heart attack  It helps if you control your blood pressure, not drink more than 1-2 alcohol drinks per day, cut down on caffeine, getting treatment for over active thyroid gland, and get regular exercise  Call your doctor if you feel your heart racing or beating unusually, chest tightness or pain, lightheaded, faint, shortness of breath especially with exercise  It is important to take your heart medication as prescribed  You may be on anticoagulation which is very important to take as directed - you may need blood work to monitor drug levels      Diagnosis: Lymphoma  Assessment and Plan of Treatment: Follow up with Dr. Rosas for chemotherapy plan    Diagnosis: Anemia  Assessment and Plan of Treatment: Follow up with PMD and Hematology for managment PRINCIPAL DISCHARGE DIAGNOSIS  Diagnosis: CHF (congestive heart failure)  Assessment and Plan of Treatment: Weigh yourself daily.  If you gain 3lbs in 3 days, or 5lbs in a week call your Health Care Provider.  Do not eat or drink foods containing more than 2000mg of salt (sodium) in your diet every day.  Call your Health Care Provider if you have any swelling or increased swelling in your feet, ankles, and/or stomach.  Take all of your medication as directed.  If you become dizzy call your Health Care Provider.        SECONDARY DISCHARGE DIAGNOSES  Diagnosis: PMR (polymyalgia rheumatica)  Assessment and Plan of Treatment: Continue steroids for now, follow up with PMD for management    Diagnosis: Diabetes mellitus  Assessment and Plan of Treatment: HgA1C this admission.  Make sure you get your HgA1c checked every three months.  If you take oral diabetes medications, check your blood glucose two times a day.  If you take insulin, check your blood glucose before meals and at bedtime.  It's important not to skip any meals.  Keep a log of your blood glucose results and always take it with you to your doctor appointments.  Keep a list of your current medications including injectables and over the counter medications and bring this medication list with you to all your doctor appointments.  If you have not seen your ophthalmologist this year call for appointment.  Check your feet daily for redness, sores, or openings. Do not self treat. If no improvement in two days call your primary care physician for an appointment.  Low blood sugar (hypoglycemia) is a blood sugar below 70mg/dl. Check your blood sugar if you feel signs/symptoms of hypoglycemia. If your blood sugar is below 70 take 15 grams of carbohydrates (ex 4 oz of apple juice, 3-4 glucose tablets, or 4-6 oz of regular soda) wait 15 minutes and repeat blood sugar to make sure it comes up above 70.  If your blood sugar is above 70 and you are due for a meal, have a meal.  If you are not due for a meal have a snack.  This snack helps keeps your blood sugar at a safe range.  Follow up with PMD next week ** keep a log of finger sticks.       Diagnosis: Atrial fibrillation  Assessment and Plan of Treatment: Atrial fibrillation is the most common heart rhythm problem.  The condition puts you at risk for has stroke and heart attack  It helps if you control your blood pressure, not drink more than 1-2 alcohol drinks per day, cut down on caffeine, getting treatment for over active thyroid gland, and get regular exercise  Call your doctor if you feel your heart racing or beating unusually, chest tightness or pain, lightheaded, faint, shortness of breath especially with exercise  It is important to take your heart medication as prescribed  You may be on anticoagulation which is very important to take as directed - you may need blood work to monitor drug levels      Diagnosis: Lymphoma  Assessment and Plan of Treatment: Follow up with Dr. Rosas for chemotherapy plan    Diagnosis: Anemia  Assessment and Plan of Treatment: Follow up with PMD and Hematology for managment

## 2019-06-06 NOTE — DISCHARGE NOTE PROVIDER - HOSPITAL COURSE
84M w/ pmh HTN, HLD, DM (glipizide), lymphoma (dx earlier this yr, on chemo x 3 mo, last dose last wk), CKD, AF (on eliquis) - p/w exertional sob, bilat LE swelling x 1 wk.         Dx: Acute diastolic CHF status post IV lasix . seen by Dr. Lazarus Pedersonib RVR , Eliquis        Hx of lymphoma, seen by Dr. Gr- will follow up for outpatient chemo            HTN,   HLD,    DM (glipizide),   CKD. . AF  on eliquis       lymphoma , dx earlier this yr, on chemo x  past 3 months,         last dose last wk,                  p/w exertional sob, bilat LE swelling x 1 wk.    acute ? diastolic  chf    troponin, normal/ elevated  bnp       lasix  iv        AF on eliquis    DM,  follow fs    cxr with cardiomegaly/ chf/  ?  pna/      ct chest,  no pna/  chf    h/o lymphoma, with anemia,/ low platelets  from lymphoma/ chemo. seen by heme        tele, AF , on   asa/ Eliquis/  lopressor  bid/  lisinopril    echo  ef  56/  start  d/c  planning 84M w/ pmh HTN, HLD, DM (glipizide), lymphoma (dx earlier this yr, on chemo x 3 mo, last dose last wk), CKD, AF (on eliquis) - p/w exertional sob, bilat LE swelling x 1 wk.         Dx: Acute diastolic CHF status post IV lasix . seen by Dr. Lazarus Pedersonib RVR , Eliquis        Hx of lymphoma, seen by Dr. Gr- will follow up for outpatient chemo            HTN,   HLD,    DM (glipizide),   CKD. . AF  on eliquis       lymphoma , dx earlier this yr, on chemo x  past 3 months,         last dose last wk,                  p/w exertional sob, bilat LE swelling x 1 wk.    acute ? diastolic  chf    troponin, normal/ elevated  bnp       lasix  iv        AF on eliquis    DM,  follow fs    cxr with cardiomegaly/ chf/  ?  pna/      ct chest,  no pna/  chf    h/o lymphoma, with anemia,/ low platelets  from lymphoma/ chemo. seen by heme        tele, AF , on   asa/ Eliquis/  lopressor  bid/  lisinopril    echo  ef  56/  s    Discharged home in stable condition

## 2019-06-06 NOTE — DISCHARGE NOTE PROVIDER - CARE PROVIDER_API CALL
Mauri Rosas)  Hematology; Internal Medicine; Medical Oncology  [address]  [place] [address]  Phone: [phone]  Fax: [phone]  Follow Up Time:     Marvel Mccartney)  Internal Medicine  [address], [place] [address]  Phone: [phone]  Fax: [phone]  Follow Up Time: Mauri Rosas)  Hematology; Internal Medicine; Medical Oncology  [address]  [place] [address]  Phone: [phone]  Fax: [phone]  Follow Up Time:     Marvel Mccartney)  Internal Medicine  [address], [place] [address]  Phone: [phone]  Fax: [phone]  Follow Up Time:     Rosa Qiu (DO)  Cardiology Medicine  [address], [address]  [place] [address]  Phone: [phone]  Fax: [phone]  Follow Up Time:

## 2019-06-06 NOTE — DISCHARGE NOTE PROVIDER - PROVIDER TOKENS
PROVIDER:[TOKEN:[[id number]]],PROVIDER:[TOKEN:[[id number]]] PROVIDER:[TOKEN:[[id number]]],PROVIDER:[TOKEN:[[id number]]],PROVIDER:[TOKEN:[[id number]]]

## 2019-06-06 NOTE — DISCHARGE NOTE NURSING/CASE MANAGEMENT/SOCIAL WORK - NSDCDPATPORTLINK_GEN_ALL_CORE
You can access the [facility] Patient Portal, offered by [facility], by registering with the following website: [email or web address]

## 2019-06-06 NOTE — PROGRESS NOTE ADULT - SUBJECTIVE AND OBJECTIVE BOX
no  cp?sob    REVIEW OF SYSTEMS:  GEN: no fever,    no chills  RESP: no SOB,   no cough  CVS: no chest pain,   no palpitations  GI: no abdominal pain,   no nausea,   no vomiting,   no constipation,   no diarrhea  : no dysuria,   no frequency  NEURO: no headache,   no dizziness  PSYCH: no depression,   not anxious  Derm : no rash    MEDICATIONS  (STANDING):  apixaban 5 milliGRAM(s) Oral every 12 hours  dextrose 5%. 1000 milliLiter(s) (50 mL/Hr) IV Continuous <Continuous>  dextrose 50% Injectable 12.5 Gram(s) IV Push once  dextrose 50% Injectable 25 Gram(s) IV Push once  dextrose 50% Injectable 25 Gram(s) IV Push once  furosemide    Tablet 40 milliGRAM(s) Oral daily  insulin glargine Injectable (LANTUS) 6 Unit(s) SubCutaneous at bedtime  insulin lispro (HumaLOG) corrective regimen sliding scale   SubCutaneous three times a day before meals  lisinopril 2.5 milliGRAM(s) Oral daily  metoprolol tartrate 100 milliGRAM(s) Oral daily  metoprolol tartrate 50 milliGRAM(s) Oral at bedtime    MEDICATIONS  (PRN):  dextrose 40% Gel 15 Gram(s) Oral once PRN Blood Glucose LESS THAN 70 milliGRAM(s)/deciliter  glucagon  Injectable 1 milliGRAM(s) IntraMuscular once PRN Glucose LESS THAN 70 milligrams/deciliter      Vital Signs Last 24 Hrs  T(C): 37 (06 Jun 2019 04:11), Max: 37 (06 Jun 2019 04:11)  T(F): 98.6 (06 Jun 2019 04:11), Max: 98.6 (06 Jun 2019 04:11)  HR: 77 (06 Jun 2019 04:11) (77 - 95)  BP: 101/59 (06 Jun 2019 04:11) (101/59 - 113/65)  BP(mean): --  RR: 18 (06 Jun 2019 04:11) (18 - 18)  SpO2: 98% (06 Jun 2019 04:11) (97% - 98%)  CAPILLARY BLOOD GLUCOSE      POCT Blood Glucose.: 132 mg/dL (06 Jun 2019 07:24)  POCT Blood Glucose.: 178 mg/dL (05 Jun 2019 21:25)  POCT Blood Glucose.: 219 mg/dL (05 Jun 2019 17:30)  POCT Blood Glucose.: 279 mg/dL (05 Jun 2019 12:46)    I&O's Summary    05 Jun 2019 07:01  -  06 Jun 2019 07:00  --------------------------------------------------------  IN: 870 mL / OUT: 1450 mL / NET: -580 mL        PHYSICAL EXAM:  HEAD:  Atraumatic, Normocephalic  NECK: Supple, No   JVD  CHEST/LUNG:   no     rales,     no,    rhonchi  HEART: Regular rate and rhythm;         murmur  ABDOMEN: Soft, Nontender, ;   EXTREMITIES:   less     edema  NEUROLOGY:  alert    LABS:                        10.6   5.6   )-----------( 92       ( 05 Jun 2019 07:04 )             30.2     06-05    137  |  96  |  28<H>  ----------------------------<  168<H>  3.9   |  29  |  1.18    Ca    9.1      05 Jun 2019 07:04                    Hemoglobin A1C, Whole Blood: 7.4 % (06-03 @ 08:15)            Consultant(s) Notes Reviewed:      Care Discussed with Consultants/Other Providers:

## 2019-06-06 NOTE — PROGRESS NOTE ADULT - SUBJECTIVE AND OBJECTIVE BOX
CARDIOLOGY     PROGRESS  NOTE   ________________________________________________    CHIEF COMPLAINT:Patient is a 84y old  Male who presents with a chief complaint of sob (06 Jun 2019 08:46)  doing better.  	  REVIEW OF SYSTEMS:  CONSTITUTIONAL: No fever, weight loss, or fatigue  EYES: No eye pain, visual disturbances, or discharge  ENT:  No difficulty hearing, tinnitus, vertigo; No sinus or throat pain  NECK: No pain or stiffness  RESPIRATORY: No cough, wheezing, chills or hemoptysis; No Shortness of Breath  CARDIOVASCULAR: No chest pain, palpitations, passing out, dizziness, or leg swelling  GASTROINTESTINAL: No abdominal or epigastric pain. No nausea, vomiting, or hematemesis; No diarrhea or constipation. No melena or hematochezia.  GENITOURINARY: No dysuria, frequency, hematuria, or incontinence  NEUROLOGICAL: No headaches, memory loss, loss of strength, numbness, or tremors  SKIN: No itching, burning, rashes, or lesions   LYMPH Nodes: No enlarged glands  ENDOCRINE: No heat or cold intolerance; No hair loss  MUSCULOSKELETAL: No joint pain or swelling; No muscle, back, or extremity pain  PSYCHIATRIC: No depression, anxiety, mood swings, or difficulty sleeping  HEME/LYMPH: No easy bruising, or bleeding gums  ALLERGY AND IMMUNOLOGIC: No hives or eczema	    [ ] All others negative	  [ ] Unable to obtain    PHYSICAL EXAM:  T(C): 37 (06-06-19 @ 04:11), Max: 37 (06-06-19 @ 04:11)  HR: 77 (06-06-19 @ 04:11) (77 - 95)  BP: 101/59 (06-06-19 @ 04:11) (101/59 - 113/65)  RR: 18 (06-06-19 @ 04:11) (18 - 18)  SpO2: 98% (06-06-19 @ 04:11) (97% - 98%)  Wt(kg): --  I&O's Summary    05 Jun 2019 07:01  -  06 Jun 2019 07:00  --------------------------------------------------------  IN: 870 mL / OUT: 1450 mL / NET: -580 mL        Appearance: Normal	  HEENT:   Normal oral mucosa, PERRL, EOMI	  Lymphatic: No lymphadenopathy  Cardiovascular: Normal S1 S2, No JVD, + murmurs, No edema  Respiratory: Lungs clear to auscultation	  Psychiatry: A & O x 3, Mood & affect appropriate  Gastrointestinal:  Soft, Non-tender, + BS	  Skin: No rashes, No ecchymoses, No cyanosis	  Neurologic: Non-focal  Extremities: Normal range of motion, No clubbing, cyanosis or edema  Vascular: Peripheral pulses palpable 2+ bilaterally    MEDICATIONS  (STANDING):  apixaban 5 milliGRAM(s) Oral every 12 hours  dextrose 5%. 1000 milliLiter(s) (50 mL/Hr) IV Continuous <Continuous>  dextrose 50% Injectable 12.5 Gram(s) IV Push once  dextrose 50% Injectable 25 Gram(s) IV Push once  dextrose 50% Injectable 25 Gram(s) IV Push once  furosemide    Tablet 40 milliGRAM(s) Oral daily  insulin glargine Injectable (LANTUS) 6 Unit(s) SubCutaneous at bedtime  insulin lispro (HumaLOG) corrective regimen sliding scale   SubCutaneous three times a day before meals  lisinopril 2.5 milliGRAM(s) Oral daily  metoprolol tartrate 100 milliGRAM(s) Oral daily  metoprolol tartrate 50 milliGRAM(s) Oral at bedtime      TELEMETRY: 	    ECG:  	  RADIOLOGY:  OTHER: 	  	  LABS:	 	    CARDIAC MARKERS:                                10.6   5.6   )-----------( 92       ( 05 Jun 2019 07:04 )             30.2     06-05    137  |  96  |  28<H>  ----------------------------<  168<H>  3.9   |  29  |  1.18    Ca    9.1      05 Jun 2019 07:04      proBNP: Serum Pro-Brain Natriuretic Peptide: 3762 pg/mL (06-02 @ 14:19)    Lipid Profile:   HgA1c: Hemoglobin A1C, Whole Blood: 7.4 % (06-03 @ 08:15)    TSH:     < from: Transthoracic Echocardiogram (06.05.19 @ 10:32) >  1. Endocardium not well visualized; grossly normal left  ventricular systolic function.  2. Normal right ventricular size and function.  3. Normal tricuspid valve. Minimal tricuspid regurgitation.  4. Small pericardial effusion.    < end of copied text >      Assessment and plan  ---------------------------  doing  well;  dc on metoprolol er 100 mg daily  lisinopril 10 mg daily  lasix 40 mg daily/ apixaban  potassium 10 meq daily and asa daily  out pt f/u in 2 weeks for possible stress test

## 2019-06-06 NOTE — PATIENT PROFILE ADULT - NSPROMUTANXFEARFT_GEN_A_NUR
Problem: [place] (,NICU)  Goal: Signs and Symptoms of Listed Potential Problems Will be Absent, Minimized or Managed ([place])  Signs and symptoms of listed potential problems will be absent, minimized or managed by discharge/transition of care (reference [place] ([place],NICU) C[facility]).   Outcome: Improving  S: Shift review  B: 3 day old , delivered  Via c/s, breastfeeding  A: Stable , tolerating feedings well. Voiding & stooling WDL  R: Continue with normal  cares.       none

## 2019-06-06 NOTE — PROGRESS NOTE ADULT - ASSESSMENT
84  yr  w/ pmh        HTN,   HLD,    DM (glipizide),   CKD. . AF  on eliquis     lymphoma , dx earlier this yr, on chemo x  past 3 months,       last dose last wk,              p/w exertional sob, bilat LE swelling x 1 wk.  acute ? diastolic  chf  troponin, normal/ elevated  bnp     lasix  iv      AF on eliquis  DM,  follow fs  cxr with cardiomegaly/ chf/  ?  pna/      ct chest,  no pna/  chf  h/o lymphoma, with anemia,/ low platelets  from lymphoma/ chemo. seen by wyatt stevenson, AF , on   asa/ Eliquis/  lopressor  bid/  lisinopril  echo  ef  56/  start  d/c  planning     < from: CT Chest No Cont (06.02.19 @ 20:18) >  IMPRESSION:Small bilateral pleural effusions, left greater than right.  Interlobular septal thickening representing pulmonary edema.    Small pericarial effusion.  Coronary artery atherosclerotic disease.  < end of copied text >

## 2019-10-08 ENCOUNTER — APPOINTMENT (OUTPATIENT)
Dept: UROLOGY | Facility: CLINIC | Age: 84
End: 2019-10-08
Payer: MEDICARE

## 2019-10-08 DIAGNOSIS — Z00.00 ENCOUNTER FOR GENERAL ADULT MEDICAL EXAMINATION W/OUT ABNORMAL FINDINGS: ICD-10-CM

## 2019-10-08 PROCEDURE — 99214 OFFICE O/P EST MOD 30 MIN: CPT

## 2019-10-10 NOTE — PHYSICAL EXAM
[General Appearance - Well Developed] : well developed [Normal Appearance] : normal appearance [General Appearance - Well Nourished] : well nourished [Well Groomed] : well groomed [General Appearance - In No Acute Distress] : no acute distress [Abdomen Tenderness] : non-tender [Abdomen Soft] : soft [Costovertebral Angle Tenderness] : no ~M costovertebral angle tenderness [Skin Color & Pigmentation] : normal skin color and pigmentation [Edema] : no peripheral edema [Respiration, Rhythm And Depth] : normal respiratory rhythm and effort [] : no respiratory distress [Exaggerated Use Of Accessory Muscles For Inspiration] : no accessory muscle use [Oriented To Time, Place, And Person] : oriented to person, place, and time [Affect] : the affect was normal [Mood] : the mood was normal [Not Anxious] : not anxious [Normal Station and Gait] : the gait and station were normal for the patient's age [No Focal Deficits] : no focal deficits [No Palpable Adenopathy] : no palpable adenopathy [Cervical Lymph Nodes Enlarged Posterior Bilaterally] : posterior cervical [Cervical Lymph Nodes Enlarged Anterior Bilaterally] : anterior cervical [Supraclavicular Lymph Nodes Enlarged Bilaterally] : supraclavicular [FreeTextEntry1] : No submandibular gland tenderness.\par

## 2019-10-10 NOTE — ADDENDUM
[FreeTextEntry1] : This note was authored by Santos Ambriz working as scribe for Dr. Higinio Stark. I, Dr. Higinio Stark, have reviewed the content of this note and confirm it is true and accurate. I personally performed the history and physical examination and made all the decisions.\par 10/8/19

## 2019-10-10 NOTE — ASSESSMENT
[FreeTextEntry1] : Mr De Souza is an 84 year old man followed for renal insufficiency, BPH and UTI. The patient has polymyalgia rheumatica, which he takes prednisone 3 mg once daily. The patient is on trimethoprim suppression to prevent urinary tract infections, which he has suffered from the past. He is on finasteride 5 mg and most recently Rapaflo 8 mg once daily with food for management of benign prostatic hypertrophy with urinary obstruction.The patient returned and reported that 2 days ago he was lifting heavy items. Today he is unable to raise his arm. At night he reported that he wakes up at night 2-3 times to urinate. The patient has not had any recent urinary tract infections. The patient will continue taking finasteride and trimethoprim as previously instructed. The patient denied any gross hematuria or dysuria. \par 5/23/18: The patient returned and reported his creatinine from 3/29/18 was 1.55 mg/dL. GFR from 3/29/18 was 43 mL/min and glucose was 277 mg/dL. Noted he consulted with nephrologist and was told to drink more water.  Wakes up 4 times during the night to urinate. Currently taking finasteride 5 mg. \par 6/6/18: The patient returned and reported Alfuzosin did not improve his nocturia. Wakes up 3-4 times during the night to urinate. Noted he urinates more often when he sits down during the day. PSA from 5/23/18 was 1.13 ng/mL. Currently taking Finasteride and Trimethoprim. \par 7/3/18: The patient returned and reported he was recently diagnosed with myelodysplastic disease, MDS. Noted he sees improvement with nocturia while on Trospium. Current nocturia x 3 compared to 6-7 times in the past. I prescribed Myrbetriq as well but he has not tried it yet, but will try after finishing his Trospium dosage. Currently taking trimethoprim and trospium. Discontinued use of Finasteride. \par 7/31/18: The patient returned and reported his urination has improved and has been taking Myrbetriq for the past 3 weeks. Wife noted he increased his water intake. Patient denied dysuria, gross hematuria, urgency, or incontinence. Wakes up 2-3 times during the night to urinate. Creatinine from 5/23/18 was 1.63 mg/dL.\par 8/14/18: Patient recently received erythropoietin. The patient returned and reported he has small red spots, not sure if from mosquitos or Colace. Discontinued use of Colace until skin is clear. Creatinine from 7/31/18 was 1.36 mg/dL. Patient had renal US today and findings showed bilateral renal cysts. Largest cyst on right kidney is 1.4 cm  in the lower pole. Largest cyst in the left kidney is 1.4 cm in the upper pole. Left kidney 11.5 cm and right kidney 11.2 cm. No hydronephrosis no renal calculi. \par 12/24/18: The patient returned today and reported urination is adequate while taking Finasteride and Trimethoprim. Urinates 5-6 times during the day. Wakes up twice during the night to urinate. Discontinued use of Myrbetriq. Complains of mild urinary urgency. Patient denies gross hematuria. Hasn't had any UTIs recently. His sugar has been high lately. Glucose from 8/14/18 was 164 mg/dL. Wife notes that the patient has a picky diet. Creatinine from 8/14/18 was 1.29 mg/dL. Currently takes Prednisone 3 mg daily for his polymyalgia rheumatica.\par \par 10/8/19: Patient presents today for a follow up. Since his last visit it is reported that he developed Lymphoma. It has been treated and he is currently in remission. Regarding his urination, he continues to take Finasteride and Trimethoprim as directed. Nocturia x 5-6 is reported. During the day his urination is better but is still bothersome. If he does not go directly to the bathroom when he first gets the urge to urinate, urgency incontinence does occur. Tamsulosin was prescribed previously but it caused him to experience significant light headedness. \par \par The patient produced a urine sample which will be sent for urinalysis, urine cytology and urine culture. \par Blood work today included comprehensive metabolic panel, CBC, PSA and testosterone. \par \par Trimethoprim and Finasteride are to be continued as directed at this time. \par Silodosin 8 mg is prescribed at this time. i believe this will decrease the frequency of nocturia. \par \par He is to follow up in 2 weeks or sooner if clinically indicated.

## 2019-10-10 NOTE — REVIEW OF SYSTEMS
[Nocturia] : nocturia [Constipation] : constipation [Joint Stiffness] : joint stiffness [Easy Bleeding] : a tendency for easy bleeding [Easy Bruising] : a tendency for easy bruising [Feeling Poorly] : not feeling poorly [Chest Pain] : no chest pain

## 2019-10-10 NOTE — HISTORY OF PRESENT ILLNESS
[FreeTextEntry1] : Mr De Souza is an 84 year old man followed for renal insufficiency, BPH and UTI. The patient has polymyalgia rheumatica, which he takes prednisone 3 mg once daily. The patient is on trimethoprim suppression to prevent urinary tract infections, which he has suffered from the past. He is on finasteride 5 mg and most recently Rapaflo 8 mg once daily with food for management of benign prostatic hypertrophy with urinary obstruction.The patient returned and reported that 2 days ago he was lifting heavy items. Today he is unable to raise his arm. At night he reported that he wakes up at night 2-3 times to urinate. The patient has not had any recent urinary tract infections. The patient will continue taking finasteride and trimethoprim as previously instructed. The patient denied any gross hematuria or dysuria. \par 5/23/18: The patient returned and reported his creatinine from 3/29/18 was 1.55 mg/dL. GFR from 3/29/18 was 43 mL/min and glucose was 277 mg/dL. Noted he consulted with nephrologist and was told to drink more water.  Wakes up 4 times during the night to urinate. Currently taking finasteride 5 mg. \par 6/6/18: The patient returned and reported Alfuzosin did not improve his nocturia. Wakes up 3-4 times during the night to urinate. Noted he urinates more often when he sits down during the day. PSA from 5/23/18 was 1.13 ng/mL. Currently taking Finasteride and Trimethoprim. \par 7/3/18: The patient returned and reported he was recently diagnosed with myelodysplastic disease, MDS. Noted he sees improvement with nocturia while on Trospium. Current nocturia x 3 compared to 6-7 times in the past. I prescribed Myrbetriq as well but he has not tried it yet, but will try after finishing his Trospium dosage. Currently taking trimethoprim and trospium. Discontinued use of Finasteride. \par 7/31/18: The patient returned and reported his urination has improved and has been taking Myrbetriq for the past 3 weeks. Wife noted he increased his water intake. Patient denied dysuria, gross hematuria, urgency, or incontinence. Wakes up 2-3 times during the night to urinate. Creatinine from 5/23/18 was 1.63 mg/dL.\par 8/14/18: Patient recently received erythropoietin. The patient returned and reported he has small red spots, not sure if from mosquitos or Colace. Discontinued use of Colace until skin is clear. Creatinine from 7/31/18 was 1.36 mg/dL. Patient had renal US today and findings showed bilateral renal cysts. Largest cyst on right kidney is 1.4 cm  in the lower pole. Largest cyst in the left kidney is 1.4 cm in the upper pole. Left kidney 11.5 cm and right kidney 11.2 cm. No hydronephrosis no renal calculi. \par 12/24/18: The patient returned today and reported urination is adequate while taking Finasteride and Trimethoprim. Urinates 5-6 times during the day. Wakes up twice during the night to urinate. Discontinued use of Myrbetriq. Complains of mild urinary urgency. Patient denies gross hematuria. Hasn't had any UTIs recently. His sugar has been high lately. Glucose from 8/14/18 was 164 mg/dL. Wife notes that the patient has a picky diet. Creatinine from 8/14/18 was 1.29 mg/dL. Currently takes Prednisone 3 mg daily for his polymyalgia rheumatica.\par \par 10/8/19: Patient presents today for a follow up. Since his last visit it is reported that he developed Lymphoma. It has been treated and he is currently in remission. Regarding his urination, he continues to take Finasteride and Trimethoprim as directed. Nocturia x 5-6 is reported. During the day his urination is better but is still bothersome. If he does not go directly to the bathroom when he first gets the urge to urinate, urgency incontinence does occur. Tamsulosin was prescribed previously but it caused him to experience significant light headedness.

## 2019-10-22 ENCOUNTER — OTHER (OUTPATIENT)
Age: 84
End: 2019-10-22

## 2019-11-05 ENCOUNTER — INPATIENT (INPATIENT)
Facility: HOSPITAL | Age: 84
LOS: 7 days | Discharge: HOME | DRG: 866 | End: 2019-11-13
Attending: INTERNAL MEDICINE | Admitting: INTERNAL MEDICINE
Payer: MEDICARE

## 2019-11-05 VITALS
DIASTOLIC BLOOD PRESSURE: 78 MMHG | HEART RATE: 126 BPM | RESPIRATION RATE: 18 BRPM | HEIGHT: 72 IN | WEIGHT: 164.91 LBS | TEMPERATURE: 99 F | SYSTOLIC BLOOD PRESSURE: 164 MMHG

## 2019-11-05 DIAGNOSIS — A41.9 SEPSIS, UNSPECIFIED ORGANISM: ICD-10-CM

## 2019-11-05 LAB
ALBUMIN SERPL ELPH-MCNC: 4.2 G/DL — SIGNIFICANT CHANGE UP (ref 3.3–5)
ALP SERPL-CCNC: 60 U/L — SIGNIFICANT CHANGE UP (ref 40–120)
ALT FLD-CCNC: 29 U/L — SIGNIFICANT CHANGE UP (ref 10–45)
ANION GAP SERPL CALC-SCNC: 16 MMOL/L — SIGNIFICANT CHANGE UP (ref 5–17)
APPEARANCE UR: CLEAR — SIGNIFICANT CHANGE UP
APTT BLD: 31.5 SEC — SIGNIFICANT CHANGE UP (ref 27.5–36.3)
AST SERPL-CCNC: 21 U/L — SIGNIFICANT CHANGE UP (ref 10–40)
BACTERIA # UR AUTO: ABNORMAL
BASE EXCESS BLDV CALC-SCNC: 1.2 MMOL/L — SIGNIFICANT CHANGE UP (ref -2–2)
BASOPHILS # BLD AUTO: 0 K/UL — SIGNIFICANT CHANGE UP (ref 0–0.2)
BASOPHILS NFR BLD AUTO: 0 % — SIGNIFICANT CHANGE UP (ref 0–2)
BILIRUB SERPL-MCNC: 0.8 MG/DL — SIGNIFICANT CHANGE UP (ref 0.2–1.2)
BILIRUB UR-MCNC: NEGATIVE — SIGNIFICANT CHANGE UP
BUN SERPL-MCNC: 23 MG/DL — SIGNIFICANT CHANGE UP (ref 7–23)
CA-I SERPL-SCNC: 1.08 MMOL/L — LOW (ref 1.12–1.3)
CALCIUM SERPL-MCNC: 8.7 MG/DL — SIGNIFICANT CHANGE UP (ref 8.4–10.5)
CHLORIDE BLDV-SCNC: 98 MMOL/L — SIGNIFICANT CHANGE UP (ref 96–108)
CHLORIDE SERPL-SCNC: 92 MMOL/L — LOW (ref 96–108)
CO2 BLDV-SCNC: 26 MMOL/L — SIGNIFICANT CHANGE UP (ref 22–30)
CO2 SERPL-SCNC: 21 MMOL/L — LOW (ref 22–31)
COLOR SPEC: YELLOW — SIGNIFICANT CHANGE UP
CREAT SERPL-MCNC: 1.1 MG/DL — SIGNIFICANT CHANGE UP (ref 0.5–1.3)
DIFF PNL FLD: NEGATIVE — SIGNIFICANT CHANGE UP
EOSINOPHIL # BLD AUTO: 0 K/UL — SIGNIFICANT CHANGE UP (ref 0–0.5)
EOSINOPHIL NFR BLD AUTO: 0 % — SIGNIFICANT CHANGE UP (ref 0–6)
EPI CELLS # UR: 1 /HPF — SIGNIFICANT CHANGE UP
GAS PNL BLDV: 123 MMOL/L — LOW (ref 135–145)
GAS PNL BLDV: SIGNIFICANT CHANGE UP
GAS PNL BLDV: SIGNIFICANT CHANGE UP
GLUCOSE BLDC GLUCOMTR-MCNC: 115 MG/DL — HIGH (ref 70–99)
GLUCOSE BLDC GLUCOMTR-MCNC: 136 MG/DL — HIGH (ref 70–99)
GLUCOSE BLDC GLUCOMTR-MCNC: 141 MG/DL — HIGH (ref 70–99)
GLUCOSE BLDC GLUCOMTR-MCNC: 168 MG/DL — HIGH (ref 70–99)
GLUCOSE BLDV-MCNC: 168 MG/DL — HIGH (ref 70–99)
GLUCOSE SERPL-MCNC: 167 MG/DL — HIGH (ref 70–99)
GLUCOSE UR QL: NEGATIVE — SIGNIFICANT CHANGE UP
HCO3 BLDV-SCNC: 25 MMOL/L — SIGNIFICANT CHANGE UP (ref 21–29)
HCT VFR BLD CALC: 31.3 % — LOW (ref 39–50)
HCT VFR BLDA CALC: 35 % — LOW (ref 39–50)
HGB BLD CALC-MCNC: 11.4 G/DL — LOW (ref 13–17)
HGB BLD-MCNC: 11 G/DL — LOW (ref 13–17)
HYALINE CASTS # UR AUTO: 2 /LPF — SIGNIFICANT CHANGE UP (ref 0–2)
INR BLD: 1.33 RATIO — HIGH (ref 0.88–1.16)
KETONES UR-MCNC: SIGNIFICANT CHANGE UP
LACTATE BLDV-MCNC: 1.4 MMOL/L — SIGNIFICANT CHANGE UP (ref 0.7–2)
LEUKOCYTE ESTERASE UR-ACNC: NEGATIVE — SIGNIFICANT CHANGE UP
LYMPHOCYTES # BLD AUTO: 1.17 K/UL — SIGNIFICANT CHANGE UP (ref 1–3.3)
LYMPHOCYTES # BLD AUTO: 23 % — SIGNIFICANT CHANGE UP (ref 13–44)
MANUAL SMEAR VERIFICATION: SIGNIFICANT CHANGE UP
MCHC RBC-ENTMCNC: 31.9 PG — SIGNIFICANT CHANGE UP (ref 27–34)
MCHC RBC-ENTMCNC: 35.1 GM/DL — SIGNIFICANT CHANGE UP (ref 32–36)
MCV RBC AUTO: 90.7 FL — SIGNIFICANT CHANGE UP (ref 80–100)
MONOCYTES # BLD AUTO: 0.66 K/UL — SIGNIFICANT CHANGE UP (ref 0–0.9)
MONOCYTES NFR BLD AUTO: 13 % — SIGNIFICANT CHANGE UP (ref 2–14)
NEUTROPHILS # BLD AUTO: 3.26 K/UL — SIGNIFICANT CHANGE UP (ref 1.8–7.4)
NEUTROPHILS NFR BLD AUTO: 64 % — SIGNIFICANT CHANGE UP (ref 43–77)
NITRITE UR-MCNC: NEGATIVE — SIGNIFICANT CHANGE UP
NRBC # BLD: 0 /100 — SIGNIFICANT CHANGE UP (ref 0–0)
PCO2 BLDV: 38 MMHG — SIGNIFICANT CHANGE UP (ref 35–50)
PH BLDV: 7.44 — SIGNIFICANT CHANGE UP (ref 7.35–7.45)
PH UR: 5.5 — SIGNIFICANT CHANGE UP (ref 5–8)
PLAT MORPH BLD: NORMAL — SIGNIFICANT CHANGE UP
PLATELET # BLD AUTO: 165 K/UL — SIGNIFICANT CHANGE UP (ref 150–400)
PO2 BLDV: 26 MMHG — SIGNIFICANT CHANGE UP (ref 25–45)
POTASSIUM BLDV-SCNC: 3.7 MMOL/L — SIGNIFICANT CHANGE UP (ref 3.5–5.3)
POTASSIUM SERPL-MCNC: 4.1 MMOL/L — SIGNIFICANT CHANGE UP (ref 3.5–5.3)
POTASSIUM SERPL-SCNC: 4.1 MMOL/L — SIGNIFICANT CHANGE UP (ref 3.5–5.3)
PROT SERPL-MCNC: 6.4 G/DL — SIGNIFICANT CHANGE UP (ref 6–8.3)
PROT UR-MCNC: ABNORMAL
PROTHROM AB SERPL-ACNC: 15.4 SEC — HIGH (ref 10–12.9)
RAPID RVP RESULT: SIGNIFICANT CHANGE UP
RBC # BLD: 3.45 M/UL — LOW (ref 4.2–5.8)
RBC # FLD: 13.8 % — SIGNIFICANT CHANGE UP (ref 10.3–14.5)
RBC BLD AUTO: SIGNIFICANT CHANGE UP
RBC CASTS # UR COMP ASSIST: 2 /HPF — SIGNIFICANT CHANGE UP (ref 0–4)
SAO2 % BLDV: 44 % — LOW (ref 67–88)
SODIUM SERPL-SCNC: 129 MMOL/L — LOW (ref 135–145)
SP GR SPEC: 1.02 — SIGNIFICANT CHANGE UP (ref 1.01–1.02)
UROBILINOGEN FLD QL: ABNORMAL
VZV IGG FLD QL IA: 114.5 INDEX — SIGNIFICANT CHANGE UP
VZV IGG FLD QL IA: NEGATIVE — SIGNIFICANT CHANGE UP
WBC # BLD: 5.09 K/UL — SIGNIFICANT CHANGE UP (ref 3.8–10.5)
WBC # FLD AUTO: 5.09 K/UL — SIGNIFICANT CHANGE UP (ref 3.8–10.5)
WBC UR QL: 2 /HPF — SIGNIFICANT CHANGE UP (ref 0–5)

## 2019-11-05 PROCEDURE — 71045 X-RAY EXAM CHEST 1 VIEW: CPT | Mod: 26

## 2019-11-05 PROCEDURE — 99221 1ST HOSP IP/OBS SF/LOW 40: CPT

## 2019-11-05 PROCEDURE — 93010 ELECTROCARDIOGRAM REPORT: CPT

## 2019-11-05 PROCEDURE — 99291 CRITICAL CARE FIRST HOUR: CPT

## 2019-11-05 RX ORDER — PIPERACILLIN AND TAZOBACTAM 4; .5 G/20ML; G/20ML
3.38 INJECTION, POWDER, LYOPHILIZED, FOR SOLUTION INTRAVENOUS ONCE
Refills: 0 | Status: COMPLETED | OUTPATIENT
Start: 2019-11-05 | End: 2019-11-05

## 2019-11-05 RX ORDER — CYCLOPENTOLATE HYDROCHLORIDE 10 MG/ML
1 SOLUTION/ DROPS OPHTHALMIC THREE TIMES A DAY
Refills: 0 | Status: DISCONTINUED | OUTPATIENT
Start: 2019-11-05 | End: 2019-11-11

## 2019-11-05 RX ORDER — DEXTROSE 50 % IN WATER 50 %
15 SYRINGE (ML) INTRAVENOUS ONCE
Refills: 0 | Status: DISCONTINUED | OUTPATIENT
Start: 2019-11-05 | End: 2019-11-13

## 2019-11-05 RX ORDER — FUROSEMIDE 40 MG
40 TABLET ORAL
Refills: 0 | Status: DISCONTINUED | OUTPATIENT
Start: 2019-11-05 | End: 2019-11-05

## 2019-11-05 RX ORDER — PREDNISOLONE SODIUM PHOSPHATE 1 %
2 DROPS OPHTHALMIC (EYE)
Refills: 0 | Status: DISCONTINUED | OUTPATIENT
Start: 2019-11-05 | End: 2019-11-13

## 2019-11-05 RX ORDER — SODIUM CHLORIDE 9 MG/ML
1000 INJECTION INTRAMUSCULAR; INTRAVENOUS; SUBCUTANEOUS
Refills: 0 | Status: DISCONTINUED | OUTPATIENT
Start: 2019-11-05 | End: 2019-11-07

## 2019-11-05 RX ORDER — SODIUM CHLORIDE 9 MG/ML
1000 INJECTION, SOLUTION INTRAVENOUS ONCE
Refills: 0 | Status: COMPLETED | OUTPATIENT
Start: 2019-11-05 | End: 2019-11-05

## 2019-11-05 RX ORDER — METOPROLOL TARTRATE 50 MG
100 TABLET ORAL DAILY
Refills: 0 | Status: DISCONTINUED | OUTPATIENT
Start: 2019-11-05 | End: 2019-11-13

## 2019-11-05 RX ORDER — METOPROLOL TARTRATE 50 MG
50 TABLET ORAL DAILY
Refills: 0 | Status: DISCONTINUED | OUTPATIENT
Start: 2019-11-05 | End: 2019-11-10

## 2019-11-05 RX ORDER — DIGOXIN 250 MCG
0.25 TABLET ORAL EVERY 4 HOURS
Refills: 0 | Status: COMPLETED | OUTPATIENT
Start: 2019-11-05 | End: 2019-11-05

## 2019-11-05 RX ORDER — VANCOMYCIN HCL 1 G
1250 VIAL (EA) INTRAVENOUS ONCE
Refills: 0 | Status: COMPLETED | OUTPATIENT
Start: 2019-11-05 | End: 2019-11-05

## 2019-11-05 RX ORDER — ACYCLOVIR SODIUM 500 MG
750 VIAL (EA) INTRAVENOUS ONCE
Refills: 0 | Status: COMPLETED | OUTPATIENT
Start: 2019-11-05 | End: 2019-11-05

## 2019-11-05 RX ORDER — GLUCAGON INJECTION, SOLUTION 0.5 MG/.1ML
1 INJECTION, SOLUTION SUBCUTANEOUS ONCE
Refills: 0 | Status: DISCONTINUED | OUTPATIENT
Start: 2019-11-05 | End: 2019-11-13

## 2019-11-05 RX ORDER — FUROSEMIDE 40 MG
20 TABLET ORAL
Refills: 0 | Status: DISCONTINUED | OUTPATIENT
Start: 2019-11-05 | End: 2019-11-05

## 2019-11-05 RX ORDER — DEXTROSE 50 % IN WATER 50 %
12.5 SYRINGE (ML) INTRAVENOUS ONCE
Refills: 0 | Status: DISCONTINUED | OUTPATIENT
Start: 2019-11-05 | End: 2019-11-13

## 2019-11-05 RX ORDER — APIXABAN 2.5 MG/1
5 TABLET, FILM COATED ORAL
Refills: 0 | Status: DISCONTINUED | OUTPATIENT
Start: 2019-11-05 | End: 2019-11-13

## 2019-11-05 RX ORDER — ERYTHROMYCIN BASE 5 MG/GRAM
1 OINTMENT (GRAM) OPHTHALMIC (EYE) THREE TIMES A DAY
Refills: 0 | Status: DISCONTINUED | OUTPATIENT
Start: 2019-11-05 | End: 2019-11-13

## 2019-11-05 RX ORDER — INSULIN LISPRO 100/ML
VIAL (ML) SUBCUTANEOUS
Refills: 0 | Status: DISCONTINUED | OUTPATIENT
Start: 2019-11-05 | End: 2019-11-13

## 2019-11-05 RX ORDER — SODIUM CHLORIDE 9 MG/ML
1000 INJECTION, SOLUTION INTRAVENOUS
Refills: 0 | Status: DISCONTINUED | OUTPATIENT
Start: 2019-11-05 | End: 2019-11-13

## 2019-11-05 RX ORDER — DEXTROSE 50 % IN WATER 50 %
25 SYRINGE (ML) INTRAVENOUS ONCE
Refills: 0 | Status: DISCONTINUED | OUTPATIENT
Start: 2019-11-05 | End: 2019-11-13

## 2019-11-05 RX ORDER — FINASTERIDE 5 MG/1
5 TABLET, FILM COATED ORAL DAILY
Refills: 0 | Status: DISCONTINUED | OUTPATIENT
Start: 2019-11-05 | End: 2019-11-13

## 2019-11-05 RX ORDER — METOPROLOL TARTRATE 50 MG
5 TABLET ORAL ONCE
Refills: 0 | Status: COMPLETED | OUTPATIENT
Start: 2019-11-05 | End: 2019-11-05

## 2019-11-05 RX ORDER — ACETAMINOPHEN 500 MG
650 TABLET ORAL EVERY 6 HOURS
Refills: 0 | Status: DISCONTINUED | OUTPATIENT
Start: 2019-11-05 | End: 2019-11-09

## 2019-11-05 RX ORDER — INSULIN LISPRO 100/ML
VIAL (ML) SUBCUTANEOUS AT BEDTIME
Refills: 0 | Status: DISCONTINUED | OUTPATIENT
Start: 2019-11-05 | End: 2019-11-13

## 2019-11-05 RX ORDER — ACETAMINOPHEN 500 MG
650 TABLET ORAL ONCE
Refills: 0 | Status: COMPLETED | OUTPATIENT
Start: 2019-11-05 | End: 2019-11-05

## 2019-11-05 RX ORDER — PANTOPRAZOLE SODIUM 20 MG/1
40 TABLET, DELAYED RELEASE ORAL
Refills: 0 | Status: DISCONTINUED | OUTPATIENT
Start: 2019-11-05 | End: 2019-11-13

## 2019-11-05 RX ORDER — LISINOPRIL 2.5 MG/1
2.5 TABLET ORAL DAILY
Refills: 0 | Status: DISCONTINUED | OUTPATIENT
Start: 2019-11-05 | End: 2019-11-08

## 2019-11-05 RX ORDER — ACYCLOVIR SODIUM 500 MG
750 VIAL (EA) INTRAVENOUS EVERY 8 HOURS
Refills: 0 | Status: DISCONTINUED | OUTPATIENT
Start: 2019-11-05 | End: 2019-11-11

## 2019-11-05 RX ADMIN — Medication 0.25 MILLIGRAM(S): at 15:09

## 2019-11-05 RX ADMIN — Medication 650 MILLIGRAM(S): at 18:15

## 2019-11-05 RX ADMIN — Medication 1 DROP(S): at 18:23

## 2019-11-05 RX ADMIN — Medication 1 DROP(S): at 15:08

## 2019-11-05 RX ADMIN — Medication 1 APPLICATION(S): at 15:09

## 2019-11-05 RX ADMIN — Medication 265 MILLIGRAM(S): at 02:41

## 2019-11-05 RX ADMIN — Medication 0.25 MILLIGRAM(S): at 10:31

## 2019-11-05 RX ADMIN — Medication 650 MILLIGRAM(S): at 01:30

## 2019-11-05 RX ADMIN — Medication 5 MILLIGRAM(S): at 02:09

## 2019-11-05 RX ADMIN — LISINOPRIL 2.5 MILLIGRAM(S): 2.5 TABLET ORAL at 10:31

## 2019-11-05 RX ADMIN — PIPERACILLIN AND TAZOBACTAM 200 GRAM(S): 4; .5 INJECTION, POWDER, LYOPHILIZED, FOR SOLUTION INTRAVENOUS at 01:30

## 2019-11-05 RX ADMIN — Medication 165 MILLIGRAM(S): at 15:08

## 2019-11-05 RX ADMIN — Medication 650 MILLIGRAM(S): at 15:25

## 2019-11-05 RX ADMIN — CYCLOPENTOLATE HYDROCHLORIDE 1 DROP(S): 10 SOLUTION/ DROPS OPHTHALMIC at 15:09

## 2019-11-05 RX ADMIN — FINASTERIDE 5 MILLIGRAM(S): 5 TABLET, FILM COATED ORAL at 12:37

## 2019-11-05 RX ADMIN — Medication 650 MILLIGRAM(S): at 10:00

## 2019-11-05 RX ADMIN — PANTOPRAZOLE SODIUM 40 MILLIGRAM(S): 20 TABLET, DELAYED RELEASE ORAL at 10:32

## 2019-11-05 RX ADMIN — Medication 2 DROP(S): at 18:23

## 2019-11-05 RX ADMIN — Medication 650 MILLIGRAM(S): at 02:20

## 2019-11-05 RX ADMIN — Medication 1 DROP(S): at 10:14

## 2019-11-05 RX ADMIN — Medication 1 APPLICATION(S): at 23:13

## 2019-11-05 RX ADMIN — SODIUM CHLORIDE 1000 MILLILITER(S): 9 INJECTION, SOLUTION INTRAVENOUS at 04:25

## 2019-11-05 RX ADMIN — Medication 50 MILLIGRAM(S): at 15:08

## 2019-11-05 RX ADMIN — Medication 1 DROP(S): at 12:37

## 2019-11-05 RX ADMIN — Medication 650 MILLIGRAM(S): at 21:33

## 2019-11-05 RX ADMIN — Medication 2 DROP(S): at 12:38

## 2019-11-05 RX ADMIN — Medication 2 DROP(S): at 23:13

## 2019-11-05 RX ADMIN — Medication 1 DROP(S): at 22:13

## 2019-11-05 RX ADMIN — Medication 100 MILLIGRAM(S): at 10:31

## 2019-11-05 RX ADMIN — Medication 1 DROP(S): at 20:12

## 2019-11-05 RX ADMIN — Medication 250 MILLIGRAM(S): at 04:20

## 2019-11-05 RX ADMIN — APIXABAN 5 MILLIGRAM(S): 2.5 TABLET, FILM COATED ORAL at 18:23

## 2019-11-05 RX ADMIN — CYCLOPENTOLATE HYDROCHLORIDE 1 DROP(S): 10 SOLUTION/ DROPS OPHTHALMIC at 23:13

## 2019-11-05 RX ADMIN — Medication 650 MILLIGRAM(S): at 09:00

## 2019-11-05 RX ADMIN — SODIUM CHLORIDE 1000 MILLILITER(S): 9 INJECTION, SOLUTION INTRAVENOUS at 01:39

## 2019-11-05 RX ADMIN — Medication 165 MILLIGRAM(S): at 22:14

## 2019-11-05 NOTE — CONSULT NOTE ADULT - CONSULT REQUESTED BY NAME
Dr Rose This is a 79 F comes with abdominal pain    ACUTE SIGMOID DIVERTICULITIS WITHOUT PERFORATION  Admit  NPO  IVF   IV zosyn 3.375g ivpb q8h  Blood cultures x 2  Trend WBC  Surgery consult  GI consult  Further work-up/management pending clinical course    HYPOTHYROID  Continue synthroid    DYSLIPIDEMIA  Hold statin for now    GI/DVT prophylaxis This is a 79 F comes with abdominal pain    ACUTE SIGMOID DIVERTICULITIS WITHOUT PERFORATION  Admit  NPO  IVF   IV zosyn 3.375g ivpb q8h  Blood cultures x 2  Trend WBC  Surgery consult  GI consult  Further work-up/management pending clinical course    HYPOTHYROID  Continue synthroid    DYSLIPIDEMIA  Hold statin for now    GI/DVT prophylaxis  Advanced care planning discussed with patient/family. Advanced care planning forms reviewed/discussed/completed. 20 minutes spent.

## 2019-11-05 NOTE — CONSULT NOTE ADULT - ASSESSMENT
83 yo male with lymphoma, DM,HTN,and A Fib admitted with left facial HSV of ocular involvement with what appears to be cutaneous dissemination.  Given his age, medical co-morbidities,lymphoma and rituxan therapy I would favor IV treatment to start.  He has facial involvement and cutaneous dissemination.He does not have any overt pulmonary, liver, or CNS involvement.  Suggest:  1.IV ACV 10,mg/kg q8  2.Keep hydrated with observation for fluid overload  3.daily BMP, ACV can crystalize in renal tubules  4.Blood cultures x 2, hold off on additional antibiotics, fever likely reflects viremia

## 2019-11-05 NOTE — CHART NOTE - NSCHARTNOTEFT_GEN_A_CORE
pt came from with r/o orbital herpezoster.  dry scab around L Orbital area. also, swelling and erythema around on L eyes.  not able to open L eyes.     spoke Opthamology ([phone])  As per Opth, call again after 9am for consult.  will follow up in AM

## 2019-11-05 NOTE — ED PROVIDER NOTE - CARE PLAN
Principal Discharge DX:	Sepsis, due to unspecified organism, unspecified whether acute organ dysfunction present  Secondary Diagnosis:	Disseminated zoster

## 2019-11-05 NOTE — H&P ADULT - HISTORY OF PRESENT ILLNESS
Pt   c/o   feeling very tired, fatigued,        and    with  3 days of rash across his L face/eye     . pt was in [place] ,  (pt came straight from airport)    . Pt was evaluated by eye doctor a few days ago due to eye redness and was told he had conjuctivitis and given eye drops. his generalized symptoms worsened and then he developed a rash around his L face and eye and went back to eye doctor and was told he had shingles and was prescribed acyclovir drops. pt was "cleared" to [place] back [place] but symptoms worsened    . per  family,  he looks very fatigued and mildly confused  . pt denies any cp, sob, cough, n/v/d, urinary symptoms.

## 2019-11-05 NOTE — CONSULT NOTE ADULT - ATTENDING COMMENTS
I have reviewed the residents note including the history, exam, assessment, and plan.  I agree with the residents assessment and plan.    Mariza Soler MD 1.81

## 2019-11-05 NOTE — H&P ADULT - NSHPPHYSICALEXAM_GEN_ALL_CORE
PHYSICAL EXAMINATION:  Vital Signs Last 24 Hrs  T(C): 37.1 (05 Nov 2019 05:36), Max: 39.4 (05 Nov 2019 00:42)  T(F): 98.7 (05 Nov 2019 05:36), Max: 102.9 (05 Nov 2019 00:42)  HR: 118 (05 Nov 2019 05:36) (108 - 177)  BP: 128/67 (05 Nov 2019 05:36) (110/55 - 164/78)  BP(mean): 69 (05 Nov 2019 01:45) (69 - 69)  RR: 20 (05 Nov 2019 05:36) (16 - 20)  SpO2: 99% (05 Nov 2019 05:36) (97% - 99%)  CAPILLARY BLOOD GLUCOSE      POCT Blood Glucose.: 168 mg/dL (05 Nov 2019 08:12)      11-04 @ 07:01  -  11-05 @ 07:00  --------------------------------------------------------  IN: 0 mL / OUT: 200 mL / NET: -200 mL        GENERAL: NAD, well-groomed,  HEAD:  atraumatic, normocephalic  EYES: sclera anicteric  ENMT: mucous membranes moist  NECK: supple, No JVD  CHEST/LUNG: clear to auscultation bilaterally;    no      rales   ,   no rhonchi,   HEART: normal S1, S2  ABDOMEN: BS+, soft, ND, NT   EXTREMITIES:    no    edema    b/l LEs  NEURO: awake, ,     moves all extremities  SKIN:   vesicles.  left face PHYSICAL EXAMINATION:  Vital Signs Last 24 Hrs  T(C): 37.1 (05 Nov 2019 05:36), Max: 39.4 (05 Nov 2019 00:42)  T(F): 98.7 (05 Nov 2019 05:36), Max: 102.9 (05 Nov 2019 00:42)  HR: 118 (05 Nov 2019 05:36) (108 - 177)  BP: 128/67 (05 Nov 2019 05:36) (110/55 - 164/78)  BP(mean): 69 (05 Nov 2019 01:45) (69 - 69)  RR: 20 (05 Nov 2019 05:36) (16 - 20)  SpO2: 99% (05 Nov 2019 05:36) (97% - 99%)  CAPILLARY BLOOD GLUCOSE      POCT Blood Glucose.: 168 mg/dL (05 Nov 2019 08:12)      11-04 @ 07:01  -  11-05 @ 07:00  --------------------------------------------------------  IN: 0 mL / OUT: 200 mL / NET: -200 mL        GENERAL: NAD, well-groomed,  HEAD:  atraumatic, normocephalic  EYES: sclera anicteric  ENMT: mucous membranes moist  NECK: supple, No JVD  CHEST/LUNG: clear to auscultation bilaterally;    no      rales   ,   no rhonchi,   HEART: normal S1, S2  ABDOMEN: BS+, soft, ND, NT   EXTREMITIES:    no    edema    b/l LEs  NEURO: awake, ,     moves all extremities  SKIN:     crusted lesions,  left face.  swollen   eyelids/ crusting

## 2019-11-05 NOTE — CONSULT NOTE ADULT - SUBJECTIVE AND OBJECTIVE BOX
HPI:   Patient is a 84y male with a past history of A Fib, CHF,DM,HTN,HLD,and marginal zone lymphoma , s/p 4 cycles of chemo in mid (bendamustine and rituxan) who came to ER after flying back from [facility] for treatment of left facial zoster.He was hospitalized in early  for treatment of CHF.He completed 4 cycles of bendamustine and rituxan in 2019.He went to [place] earlier in October and developed mouth pain followed by eye pain last week.He was initially given antibiotics by dentist,saw additional people when a facial rash developed, eventually was diagnosed as zoster and his family flew him back to [place] last night.He came strait to the hospital from the airport, had a 102.9 fever, was given fluids, a dose of zosyn,and started on IV ACV.optho has seen, no deep ocular involvement.He is alert, denies any headache,is able to follow commands.He has about 20 scattered lesions over body,trunk amd all 4 extremities.His left facial lesions have crusted.    REVIEW OF SYSTEMS:  All other review of systems negative (Comprehensive ROS): fever and weakness.    PAST MEDICAL & SURGICAL HISTORY:  Atrial fibrillation  Anemia  DM2 (diabetes mellitus, type 2)  Osteoporosis  GERD (gastroesophageal reflux disease)  Benign prostatic hypertrophy  HTN (hypertension)  No significant past surgical history  Marginal zone lymphoma    Allergies    No Known Allergies    Intolerances        Antimicrobials Day #  :day 1  acyclovir IVPB 750 milliGRAM(s) IV Intermittent every 8 hours    Other Medications:  acetaminophen   Tablet .. 650 milliGRAM(s) Oral every 6 hours PRN  apixaban 5 milliGRAM(s) Oral two times a day  artificial tears (preservative free) Ophthalmic Solution 1 Drop(s) Left EYE every 2 hours  cyclopentolate 1% Solution 1 Drop(s) Left EYE three times a day  digoxin  Injectable 0.25 milliGRAM(s) IV Push every 4 hours  erythromycin   Ointment 1 Application(s) Left EYE three times a day  finasteride 5 milliGRAM(s) Oral daily  lisinopril 2.5 milliGRAM(s) Oral daily  metoprolol tartrate 100 milliGRAM(s) Oral daily  metoprolol tartrate 50 milliGRAM(s) Oral daily  pantoprazole    Tablet 40 milliGRAM(s) Oral before breakfast  prednisoLONE acetate 1% Suspension 2 Drop(s) Left EYE four times a day      FAMILY HISTORY:NC      SOCIAL HISTORY:  Smoking: x    ETOH:  x   Drug Use: x        T(F): 102.1 (19 @ 10:30), Max: 102.9 (19 @ 00:42)  HR: 95 (19 @ 10:30)  BP: 131/65 (19 @ 10:30)  RR: 18 (19 @ 10:30)  SpO2: 99% (19 @ 10:30)  Wt(kg): --    PHYSICAL EXAM:  General: alert, no acute distress  Eyes:  see optho note, both eyes are shut, difficult to examine.Left facial zoster lesions are crusted  Oropharynx: no lesions or injection 	  Neck: supple, without adenopathy  Lungs: clear to auscultation  Heart: irregular rate and rhythm; no murmur, rubs or gallops  Abdomen: soft, nondistended, nontender, without mass or organomegaly  Skin: scattered vessicles over trunk and extremities in various stages of development  Extremities: no clubbing, cyanosis, or edema  Neurologic: alert, oriented, moves all extremities    LAB RESULTS:                        11.0   5.09  )-----------( 165      ( 2019 01:07 )             31.3     11-    129<L>  |  92<L>  |  23  ----------------------------<  167<H>  4.1   |  21<L>  |  1.10    Ca    8.7      2019 01:07    TPro  6.4  /  Alb  4.2  /  TBili  0.8  /  DBili  x   /  AST  21  /  ALT  29  /  AlkPhos  60  11-05    LIVER FUNCTIONS - ( 2019 01:07 )  Alb: 4.2 g/dL / Pro: 6.4 g/dL / ALK PHOS: 60 U/L / ALT: 29 U/L / AST: 21 U/L / GGT: x           Urinalysis Basic - ( 2019 01:42 )    Color: Yellow / Appearance: Clear / S.020 / pH: x  Gluc: x / Ketone: Trace  / Bili: Negative / Urobili: 2 mg/dL   Blood: x / Protein: Trace / Nitrite: Negative   Leuk Esterase: Negative / RBC: 2 /hpf / WBC 2 /HPF   Sq Epi: x / Non Sq Epi: 1 /hpf / Bacteria: Few        MICROBIOLOGY:  RECENT CULTURES:    pending    RADIOLOGY REVIEWED:  < from: Xray Chest 1 View- PORTABLE-Urgent (19 @ 01:01) >  IMPRESSION:   Clear lungs.    < end of copied text >

## 2019-11-05 NOTE — H&P ADULT - ASSESSMENT
84  yr  w/ pmh        HTN,   HLD,    DM (glipizide),   CKD. , CAD  on ct, . AF  on eliquis,  diastolic  ch       marginal  zone lymphoma ,    s/p chemo by dr brown,  in 2018 .  h/o  anemia/ low platelets              admitted  with  h  zoster ,  face / V1  distrubution   on iv acylovir   DM,  follow fs   AF , on   asa/ Eliquis/  lopressor  bid/  lisinopril/ echo  ef  56     ID  eval   seen by  opthalmology    hyponatremia.  on lasix         < from: CT Chest No Cont (06.02.19 @ 20:18) >  IMPRESSION:Small bilateral pleural effusions, left greater than right.  Interlobular septal thickening representing pulmonary edema.  Small pericarial effusion.  Coronary artery atherosclerotic disease.  < end of copied text > 84  yr  w/ pmh        HTN,   HLD,    DM (glipizide),   CKD. , CAD  on ct, . AF  on eliquis,  diastolic  ch       marginal  zone lymphoma ,    s/p chemo by dr brown,  in 2018 .  h/o  anemia/ low platelets              admitted  with  left face/  eyelids,  h  zoster/  with crusted lesions,  ,  face / V1  distrubution, trigeminal  nerve   no lesions,  left  ear  eyes.  no  dendritic  corneal  lesions  per  ophthalmology  on iv Acyclovir   DM,  follow fs   AF , on   asa/ Eliquis/  lopressor  bid/  lisinopril/ echo  ef  56     ID  eval   seen by  opthalmology /  local rx   as  per  ophthalmology   hyponatremia.  on lasix  labs  in am    spoke  to  wife         < from: CT Chest No Cont (06.02.19 @ 20:18) >  IMPRESSION:Small bilateral pleural effusions, left greater than right.  Interlobular septal thickening representing pulmonary edema.  Small pericarial effusion.  Coronary artery atherosclerotic disease.  < end of copied text >

## 2019-11-05 NOTE — ED ADULT NURSE REASSESSMENT NOTE - NS ED NURSE REASSESS COMMENT FT1
Pt had one run, 8 beats of v MD Alpesh andrade aware and presented tele strip. Tele strip placed in chart.

## 2019-11-05 NOTE — ED PROVIDER NOTE - OBJECTIVE STATEMENT
Pt endorses a few days of feeling very tired, fatigued, sleepy as well as 3 days of rash across his L face/eye. pt was in [place] until this evening (pt came straight from airport). Pt was evaluated by eye doctor a few days ago due to eye redness and was told he had conjuctivitis and given eye drops. his generalized symptoms worsened and then he developed a rash around his L face and eye and went back to eye doctor and was told he had shingles and was prescribed acyclovir drops. pt was "cleared" to [place] back [place] but symptoms worsened. familys tates he looks very fatigued and mildly confused. pt denies any cp, sob, cough, n/v/d, urinary symptoms.     Pt has hx of lymphoma but was told he is in remission and has not had chemo in 6 months.

## 2019-11-05 NOTE — CONSULT NOTE ADULT - ASSESSMENT
Pt   c/o   feeling very tired, fatigued, and    with  3 days of rash across his L face/ey . pt was in [place] ,  (pt came straight from airport)    . Pt was evaluated by eye doctor a few days ago due to eye redness and was told he had conjunctivitis and given eye drops. his generalized symptoms worsened and then he developed a rash around his L face and eye and went back to eye doctor and was told he had shingles and was prescribed acyclovir drops. pt was "cleared" to [place] back [place] but symptoms worsened  pt with hx of a.fib, htn and DM and ? hx of chf.  no chest pain . + lorenzana.    pt was recently admitted with chf and rapid A.fib and meds was adjusted  now with herpes zoster with tachycardia and LORENZANA  a.fib with RVR  continue beta blocker, may change to Succinate  will add dig temporarily till hr is better controlled  may increase beta blocker to 100 mg bid as tolerated slowly  continue AC  tsh  abx for zoster/optho

## 2019-11-05 NOTE — H&P ADULT - NSHPLABSRESULTS_GEN_ALL_CORE
LABS:                        11.0   5.09  )-----------( 165      ( 2019 01:07 )             31.3         129<L>  |  92<L>  |  23  ----------------------------<  167<H>  4.1   |  21<L>  |  1.10    Ca    8.7      2019 01:07    TPro  6.4  /  Alb  4.2  /  TBili  0.8  /  DBili  x   /  AST  21  /  ALT  29  /  AlkPhos  60      PT/INR - ( 2019 01:07 )   PT: 15.4 sec;   INR: 1.33 ratio         PTT - ( 2019 01:07 )  PTT:31.5 sec      Urinalysis Basic - ( 2019 01:42 )    Color: Yellow / Appearance: Clear / S.020 / pH: x  Gluc: x / Ketone: Trace  / Bili: Negative / Urobili: 2 mg/dL   Blood: x / Protein: Trace / Nitrite: Negative   Leuk Esterase: Negative / RBC: 2 /hpf / WBC 2 /HPF   Sq Epi: x / Non Sq Epi: 1 /hpf / Bacteria: Few           @ 01:07  3.7  26

## 2019-11-05 NOTE — CONSULT NOTE ADULT - SUBJECTIVE AND OBJECTIVE BOX
[facility] DEPARTMENT OF OPHTHALMOLOGY - INITIAL ADULT CONSULT  -----------------------------------------------------------------------------  Musa Alvarez MD PGY-2  Pager: [phone]/[facility]: [phone]  -----------------------------------------------------------------------------    HPI:    Interval History: ***    PAST MEDICAL & SURGICAL HISTORY:  Atrial fibrillation  Anemia  DM2 (diabetes mellitus, type 2)  Osteoporosis  GERD (gastroesophageal reflux disease)  Benign prostatic hypertrophy  HTN (hypertension)  No significant past surgical history    Past Ocular History: ***    FAMILY HISTORY:    Social History: ***    Ophthalmic Medications: ***    MEDICATIONS  (STANDING):    MEDICATIONS  (PRN):    Allergies & Intolerances:     Review of Systems:  Constitutional: No fever, chills  Eyes: No blurry vision, flashes, floaters, FBS, erythema, discharge, double vision, OU  Neuro: No tremors  Cardiovascular: No chest pain, palpitations  Respiratory: No SOB, no cough  GI: No nausea, vomiting, abdominal pain  : No dysuria  Skin: no rash  Psych: no depression  Endocrine: no polyuria, polydipsia  Heme/lymph: no swelling    VITALS: T(C): 37.1 (11-05-19 @ 05:36)  T(F): 98.7 (11-05-19 @ 05:36), Max: 102.9 (11-05-19 @ 00:42)  HR: 118 (11-05-19 @ 05:36) (108 - 177)  BP: 128/67 (11-05-19 @ 05:36) (110/55 - 164/78)  RR:  (16 - 20)  SpO2:  (97% - 99%)  Wt(kg): --  General: AAO x 3, appropriate mood and affect    Ophthalmology Exam:  Visual acuity (sc): 20/20 OU  Pupils: PERRL OU, no APD  Ttono: 16 OU  Extraocular movements (EOMs): Full OU, no pain, no diplopia  Confrontational Visual Field (CVF): Full OU  Color Plates: 12/12 OU    Pen Light Exam (PLE)  External: Flat OU  Lids/Lashes/Lacrimal Ducts: Flat OU    Sclera/Conjunctiva: W+Q OU  Cornea: Cl OU  Anterior Chamber: D+F OU    Iris: Flat OU  Lens: Cl OU    Fundus Exam: dilated with 1% tropicamide and 2.5% phenylephrine  Approval obtained from primary team for dilation  Patient aware that pupils can remained dilated for at least 4-6 hours  Exam performed with 20D lens    Vitreous: wnl OU  Disc, cup/disc: sharp and pink, 0.4 OU  Macula: wnl OU  Vessels: wnl OU  Periphery: wnl OU    Labs/Imaging:  *** [facility] DEPARTMENT OF OPHTHALMOLOGY - INITIAL ADULT CONSULT  -----------------------------------------------------------------------------  Musa Alvarez MD PGY-2  Pager: [phone]/[facility]: [phone]  -----------------------------------------------------------------------------    HPI: 24th noticed pain on left side of face. shortly after noticed eye was more swollen and red[facility]. Then when wasn't improving, went back to ophtho Sunday AM and was told it was zoster. [place] No eye complaints other than swelling OS.     Interval History: ***    PAST MEDICAL & SURGICAL HISTORY:  Atrial fibrillation  Anemia  DM2 (diabetes mellitus, type 2)  Osteoporosis  GERD (gastroesophageal reflux disease)  Benign prostatic hypertrophy  HTN (hypertension)  No significant past surgical history    Past Ocular History: CEIOL OU    FAMILY HISTORY: denies    Social History: denies    Ophthalmic Medications: acyclovir gtt OS and azidamfenicol OS    MEDICATIONS  (STANDING):    MEDICATIONS  (PRN):    Allergies & Intolerances:     Review of Systems:  Constitutional: No fever, chills  Eyes: No blurry vision, flashes, floaters, FBS, erythema, discharge, double vision, OU  Neuro: No tremors  Cardiovascular: No chest pain, palpitations  Respiratory: No SOB, no cough  GI: No nausea, vomiting, abdominal pain  : No dysuria  Skin: no rash  Psych: no depression  Endocrine: no polyuria, polydipsia  Heme/lymph: no swelling    VITALS: T(C): 37.1 (11-05-19 @ 05:36)  T(F): 98.7 (11-05-19 @ 05:36), Max: 102.9 (11-05-19 @ 00:42)  HR: 118 (11-05-19 @ 05:36) (108 - 177)  BP: 128/67 (11-05-19 @ 05:36) (110/55 - 164/78)  RR:  (16 - 20)  SpO2:  (97% - 99%)  Wt(kg): --  General: AAO x 3, appropriate mood and affect    Ophthalmology Exam:  Visual acuity (sc): 20/25 OD, 20/70 phni OS  Pupils: PERRL OU, no APD  Ttono: 13 OU  Extraocular movements (EOMs): Full OU, no pain, no diplopia  Confrontational Visual Field (CVF): Full OU  Color Plates: 12/12 OU    Slit lamp  External: crusting in V1 distribution left side  Lids/Lashes/Lacrimal Ducts: JEAN PIERRE swelling and few crusted over lesions.   Sclera/Conjunctiva: W+Q OD, OS w/ chemosis 360 and mild injection  Cornea: Cl OD, OS w/ 2+ SPK diffusely  Anterior Chamber: D+Q OD, OS 1+ cell/flare  Iris: Flat OU  Lens: PCIOL OU    Fundus Exam: dilated with 1% tropicamide and 2.5% phenylephrine  Approval obtained from primary team for dilation  Patient aware that pupils can remained dilated for at least 4-6 hours  Exam performed with 20D lens    Vitreous: wnl OU  Disc, cup/disc: sharp and pink, 0.4 OU  Macula: wnl OU  Vessels: wnl OU  Periphery: wnl OU [facility] DEPARTMENT OF OPHTHALMOLOGY - INITIAL ADULT CONSULT  -----------------------------------------------------------------------------  Musa Alvarez MD PGY-2  Pager: [phone]/[facility]: [phone]  -----------------------------------------------------------------------------    HPI: 84M h/o lymphoma in remission, no chemo for last 6 months. Reports on 10/24th noticed pain on left side of face. At first was treated with antibiotics, though to be tooth/oral related pathology. Then shortly after noticed eye was more swollen and red. Went to ophtho and was told it was conjunctivitis. Then when wasn't improving, went back to ophtho Sunday AM and was told it was zoster. [place] No eye complaints other than swelling OS.     PAST MEDICAL & SURGICAL HISTORY:  Atrial fibrillation  Anemia  DM2 (diabetes mellitus, type 2)  Osteoporosis  GERD (gastroesophageal reflux disease)  Benign prostatic hypertrophy  HTN (hypertension)  No significant past surgical history    Past Ocular History: CEIOL OU    FAMILY HISTORY: denies    Social History: denies    Ophthalmic Medications: acyclovir gtt OS and azidamfenicol OS    MEDICATIONS  (STANDING):    MEDICATIONS  (PRN):    Allergies & Intolerances:     Review of Systems:  Constitutional: No fever, chills  Eyes: No blurry vision, flashes, floaters, FBS, erythema, discharge, double vision, OU  Neuro: No tremors  Cardiovascular: No chest pain, palpitations  Respiratory: No SOB, no cough  GI: No nausea, vomiting, abdominal pain  : No dysuria  Skin: no rash  Psych: no depression  Endocrine: no polyuria, polydipsia  Heme/lymph: no swelling    VITALS: T(C): 37.1 (11-05-19 @ 05:36)  T(F): 98.7 (11-05-19 @ 05:36), Max: 102.9 (11-05-19 @ 00:42)  HR: 118 (11-05-19 @ 05:36) (108 - 177)  BP: 128/67 (11-05-19 @ 05:36) (110/55 - 164/78)  RR:  (16 - 20)  SpO2:  (97% - 99%)  Wt(kg): --  General: AAO x 3, appropriate mood and affect    Ophthalmology Exam:  Visual acuity (sc): 20/25 OD, 20/70 phni OS  Pupils: PERRL OU, no APD  Ttono: 13 OU  Extraocular movements (EOMs): Full OU, no pain, no diplopia  Confrontational Visual Field (CVF): Full OU  Color Plates: 12/12 OU    Slit lamp  External: crusting in V1 distribution left side  Lids/Lashes/Lacrimal Ducts: JEAN PIERRE swelling and few crusted over lesions.   Sclera/Conjunctiva: W+Q OD, OS w/ chemosis 360 and mild injection  Cornea: Cl OD, OS w/ 2+ SPK diffusely, no pseudodendrites  Anterior Chamber: D+Q OD, OS tr-1+ cell/flare  Iris: Flat OU  Lens: PCIOL OU    Fundus Exam: dilated with 1% tropicamide and 2.5% phenylephrine  Approval obtained from primary team for dilation  Patient aware that pupils can remained dilated for at least 4-6 hours  Exam performed with 20D lens    Vitreous: wnl OU  Disc, cup/disc: sharp and pink, 0.4 OU  Macula: wnl OU  Vessels: wnl OU  Periphery: wnl OU

## 2019-11-05 NOTE — CONSULT NOTE ADULT - ASSESSMENT
Assessment and Recommendations:  84y male w/ shingles OS V1 distribution consulted for r/o HZO.     Outpatient follow-up: Patient should follow-up with his/her ophthalmologist or with [facility] Department of Ophthalmology within 1 week of after discharge at:    [address]  [place] [address]  [phone]    Musa Alvarez MD, PGY-2  Pager: [phone]/[facility]: [phone] Assessment and Recommendations:  84y male w/ shingles OS V1 distribution consulted for r/o HZO. BCVA 20/25 OD, 20/70 OS, IOP wnl OU, CVF, EOM, color full OU, Anterior exam wnl OD, OS w/ JEAN PIERRE swelling, SPK, tr-1+ cell/flare, no pseudodendrites OS. posterior exam wnl OU.   - for skin involvement: valtrex 1g TID po 7-10 days, use erythromycin ophthalmic ointment TID to periocular lesions  - for conjunctival involvement: cool compresses and erythromycin ointment to the left eye TID  - for corneal involvement with SPK: preservative free artificial tears every 1-2 hours to the left eye  - for uveitis: start cyclopentolate drops TID to the left eye and pred forte drops QID to the left eye  - we will continue to follow with his care    Outpatient follow-up: Patient should follow-up with his/her ophthalmologist or with [facility] Department of Ophthalmology within 1 week of after discharge at:    [address]  [place] [address]  [phone]    D/W Dr. Allen (chief)    Musa Alvarez MD, PGY-2  Pager: [phone]/[facility]: [phone] Assessment and Recommendations:  84y male w/ shingles OS V1 distribution consulted for r/o HZO. BCVA 20/25 OD, 20/70 OS, IOP wnl OU, CVF, EOM, color full OU, Anterior exam wnl OD, OS w/ JEAN PIERRE swelling, SPK, tr-1+ cell/flare, no pseudodendrites OS. posterior exam wnl OU.   - for skin involvement: valtrex 1g TID po 7-10 days, use erythromycin ophthalmic ointment TID to periocular lesions  - for conjunctival involvement: cool compresses and erythromycin ointment to the left eye TID  - for corneal involvement with SPK: preservative free artificial tears every 1-2 hours to the left eye  - for uveitis: start cyclopentolate drops TID to the left eye and pred forte drops QID to the left eye  - we will continue to follow with his care    Outpatient follow-up: Patient should follow-up with his/her ophthalmologist or with [facility] Department of Ophthalmology next day after discharge if still undergoing treatment for zoster at:    [address]  [place] [address]  [phone]    D/W Dr. Allen (chief)    Musa Alvarez MD, PGY-2  Pager: [phone]/[facility]: [phone] Assessment and Recommendations:  84y male w/ shingles OS V1 distribution consulted for r/o HZO. BCVA 20/25 OD, 20/70 OS, IOP wnl OU, CVF, EOM, color full OU, Anterior exam wnl OD, OS w/ JEAN PIERRE swelling, SPK, tr-1+ cell/flare, no pseudodendrites OS. posterior exam wnl OU.   - for skin involvement: valtrex 1g TID IV vs po per primary team, use erythromycin ophthalmic ointment TID to periocular lesions  - for conjunctival involvement: cool compresses and erythromycin ointment to the left eye TID  - for corneal involvement with SPK: preservative free artificial tears every 1-2 hours to the left eye  - for uveitis: start cyclopentolate drops TID to the left eye and pred forte drops QID to the left eye  - we will continue to follow with his care  - findings and plan discussed with patient and primary team    Outpatient follow-up: Patient should follow-up with his/her ophthalmologist or with [facility] Department of Ophthalmology next day after discharge if still undergoing treatment for zoster at:    [address]  [place] [address]  [phone]    D/W Dr. Allen (chief)    Musa Alvarez MD, PGY-2  Pager: [phone]/[facility]: [phone]

## 2019-11-05 NOTE — ED PROVIDER NOTE - CLINICAL SUMMARY MEDICAL DECISION MAKING FREE TEXT BOX
Pt presents with tachycardia to 150s in afib, febrile, with rash to L face and L eye with discharge. there is concern about sepsis and disseminated zoster given rash. pt will get sepsis workup, 2 L LR (evaluating between liters to ensure no fluid overload), broad specturm abx including acylcovir, will require ICU admission. pt also receiving small boluses metoprolol to help treat afib rvr. no signs of shock at this time.

## 2019-11-05 NOTE — ED ADULT NURSE NOTE - NSIMPLEMENTINTERV_GEN_ALL_ED
Implemented All Fall Risk Interventions:  [place] to call system. Call bell, personal items and telephone within reach. Instruct patient to call for assistance. Room bathroom lighting operational. Non-slip footwear when patient is off stretcher. Physically safe environment: no spills, clutter or unnecessary equipment. Stretcher in lowest position, wheels locked, appropriate side rails in place. Provide visual cue, wrist band, yellow gown, etc. Monitor gait and stability. Monitor for mental status changes and reorient to person, place, and time. Review medications for side effects contributing to fall risk. Reinforce activity limits and safety measures with patient and family.

## 2019-11-05 NOTE — ED PROVIDER NOTE - PHYSICAL EXAMINATION
GEN: fatigued  HEENT: NC/AT, PERRL, soft tissue swelling around L eye, L eye discharge and crusting, rash as below  Neck: supple, ROM intact  CV: tachycardic, irregular rhythm  RESP: bilat ronchi, no distress  ABD: Soft, nt/nd.   EXT/MSK: No lower extremity edema or calf tenderness. FROMx4  SKIN: +erythematous rash over L side of upper face around eye and zygoma with some dried vesicles  Neuro: awake, following commands, no focal deficits

## 2019-11-05 NOTE — ED ADULT NURSE NOTE - OBJECTIVE STATEMENT
85 y/o male PMH HF, A fib, lymphoma, most recent treatment 3 months ago presents to ED reporting eye pain. Pt reports via waiting room directly from airport, recent travel from [place]. Per family pt reported tooth pain in [place] & received x-rays which were negative per family. Family then reports pt L eye was bothering pt & was diagnosed with conjunctivitis and prescribed eye drops. Per family since arrival at [facility] pt has been weak and confused. On exam, AOx3, speaking in short sentences, translated from [place] by family members. Lung sounds CTA, NAD. Abdomen soft, non-tender, non-distended. L eye inflamed and swollen shut, with crusting at edge, red rash noted throughout body. On saccrum, blanchable area of redness noted. Oral temp 102.9, MD Betts aware and CM in place A fib HR 170s. Bilateral heplock placed, labs sent. Awaiting imaging. Family at bedside.

## 2019-11-06 DIAGNOSIS — D64.9 ANEMIA, UNSPECIFIED: ICD-10-CM

## 2019-11-06 LAB
ANION GAP SERPL CALC-SCNC: 13 MMOL/L — SIGNIFICANT CHANGE UP (ref 5–17)
BUN SERPL-MCNC: 13 MG/DL — SIGNIFICANT CHANGE UP (ref 7–23)
CALCIUM SERPL-MCNC: 8.6 MG/DL — SIGNIFICANT CHANGE UP (ref 8.4–10.5)
CHLORIDE SERPL-SCNC: 94 MMOL/L — LOW (ref 96–108)
CO2 SERPL-SCNC: 23 MMOL/L — SIGNIFICANT CHANGE UP (ref 22–31)
CREAT SERPL-MCNC: 0.98 MG/DL — SIGNIFICANT CHANGE UP (ref 0.5–1.3)
GLUCOSE BLDC GLUCOMTR-MCNC: 134 MG/DL — HIGH (ref 70–99)
GLUCOSE BLDC GLUCOMTR-MCNC: 149 MG/DL — HIGH (ref 70–99)
GLUCOSE BLDC GLUCOMTR-MCNC: 179 MG/DL — HIGH (ref 70–99)
GLUCOSE BLDC GLUCOMTR-MCNC: 186 MG/DL — HIGH (ref 70–99)
GLUCOSE SERPL-MCNC: 136 MG/DL — HIGH (ref 70–99)
HBA1C BLD-MCNC: 7.3 % — HIGH (ref 4–5.6)
HCT VFR BLD CALC: 29 % — LOW (ref 39–50)
HGB BLD-MCNC: 10.2 G/DL — LOW (ref 13–17)
MCHC RBC-ENTMCNC: 32.3 PG — SIGNIFICANT CHANGE UP (ref 27–34)
MCHC RBC-ENTMCNC: 35.2 GM/DL — SIGNIFICANT CHANGE UP (ref 32–36)
MCV RBC AUTO: 91.8 FL — SIGNIFICANT CHANGE UP (ref 80–100)
PLATELET # BLD AUTO: 113 K/UL — LOW (ref 150–400)
POTASSIUM SERPL-MCNC: 3.9 MMOL/L — SIGNIFICANT CHANGE UP (ref 3.5–5.3)
POTASSIUM SERPL-SCNC: 3.9 MMOL/L — SIGNIFICANT CHANGE UP (ref 3.5–5.3)
RBC # BLD: 3.16 M/UL — LOW (ref 4.2–5.8)
RBC # FLD: 13.2 % — SIGNIFICANT CHANGE UP (ref 10.3–14.5)
SODIUM SERPL-SCNC: 130 MMOL/L — LOW (ref 135–145)
TSH SERPL-MCNC: 1.25 UIU/ML — SIGNIFICANT CHANGE UP (ref 0.27–4.2)
WBC # BLD: 3.75 K/UL — LOW (ref 3.8–10.5)
WBC # FLD AUTO: 3.75 K/UL — LOW (ref 3.8–10.5)

## 2019-11-06 RX ADMIN — CYCLOPENTOLATE HYDROCHLORIDE 1 DROP(S): 10 SOLUTION/ DROPS OPHTHALMIC at 07:28

## 2019-11-06 RX ADMIN — Medication 1 DROP(S): at 00:12

## 2019-11-06 RX ADMIN — Medication 1 DROP(S): at 05:23

## 2019-11-06 RX ADMIN — APIXABAN 5 MILLIGRAM(S): 2.5 TABLET, FILM COATED ORAL at 17:37

## 2019-11-06 RX ADMIN — Medication 1 DROP(S): at 11:46

## 2019-11-06 RX ADMIN — Medication 2 DROP(S): at 17:37

## 2019-11-06 RX ADMIN — Medication 1 DROP(S): at 23:47

## 2019-11-06 RX ADMIN — Medication 2 DROP(S): at 07:27

## 2019-11-06 RX ADMIN — Medication 100 MILLIGRAM(S): at 23:56

## 2019-11-06 RX ADMIN — Medication 1 DROP(S): at 08:06

## 2019-11-06 RX ADMIN — Medication 1 DROP(S): at 13:16

## 2019-11-06 RX ADMIN — Medication 1 APPLICATION(S): at 21:06

## 2019-11-06 RX ADMIN — SODIUM CHLORIDE 50 MILLILITER(S): 9 INJECTION INTRAMUSCULAR; INTRAVENOUS; SUBCUTANEOUS at 17:39

## 2019-11-06 RX ADMIN — Medication 1 DROP(S): at 16:35

## 2019-11-06 RX ADMIN — Medication 165 MILLIGRAM(S): at 13:15

## 2019-11-06 RX ADMIN — FINASTERIDE 5 MILLIGRAM(S): 5 TABLET, FILM COATED ORAL at 11:46

## 2019-11-06 RX ADMIN — CYCLOPENTOLATE HYDROCHLORIDE 1 DROP(S): 10 SOLUTION/ DROPS OPHTHALMIC at 21:06

## 2019-11-06 RX ADMIN — Medication 1 DROP(S): at 21:03

## 2019-11-06 RX ADMIN — Medication 2 DROP(S): at 11:46

## 2019-11-06 RX ADMIN — CYCLOPENTOLATE HYDROCHLORIDE 1 DROP(S): 10 SOLUTION/ DROPS OPHTHALMIC at 13:16

## 2019-11-06 RX ADMIN — Medication 50 MILLIGRAM(S): at 17:36

## 2019-11-06 RX ADMIN — Medication 165 MILLIGRAM(S): at 07:28

## 2019-11-06 RX ADMIN — Medication 1: at 12:14

## 2019-11-06 RX ADMIN — Medication 1 DROP(S): at 07:28

## 2019-11-06 RX ADMIN — Medication 1 DROP(S): at 03:24

## 2019-11-06 RX ADMIN — Medication 1 DROP(S): at 10:00

## 2019-11-06 RX ADMIN — Medication 1 APPLICATION(S): at 13:16

## 2019-11-06 RX ADMIN — Medication 100 MILLIGRAM(S): at 17:59

## 2019-11-06 RX ADMIN — PANTOPRAZOLE SODIUM 40 MILLIGRAM(S): 20 TABLET, DELAYED RELEASE ORAL at 07:27

## 2019-11-06 RX ADMIN — LISINOPRIL 2.5 MILLIGRAM(S): 2.5 TABLET ORAL at 07:27

## 2019-11-06 RX ADMIN — APIXABAN 5 MILLIGRAM(S): 2.5 TABLET, FILM COATED ORAL at 07:28

## 2019-11-06 RX ADMIN — Medication 1 APPLICATION(S): at 07:27

## 2019-11-06 RX ADMIN — Medication 1 DROP(S): at 17:36

## 2019-11-06 RX ADMIN — Medication 100 MILLIGRAM(S): at 07:27

## 2019-11-06 RX ADMIN — Medication 1 DROP(S): at 21:04

## 2019-11-06 RX ADMIN — Medication 165 MILLIGRAM(S): at 21:04

## 2019-11-06 RX ADMIN — Medication 2 DROP(S): at 23:46

## 2019-11-06 NOTE — CHART NOTE - NSCHARTNOTEFT_GEN_A_CORE
CC: Fever    Event Summary:  Called by RN to evaluate pt with rectal temp of 102.8F. Pt seen and examined at bedside, in NAD, daughter present in room. Patient with no acute complaints. Denies c/o CP, SOB, palpitations, HA, cough, diaphoresis, chills, N/V/D, abd pain or dysuria.      VITAL SIGNS:  T(C): 38.3 (11-06-19 @ 00:21), Max: 39.3 (11-05-19 @ 23:22)  HR: 80 (11-06-19 @ 00:21) (80 - 118)  BP: 107/60 (11-06-19 @ 00:21) (107/60 - 131/65)  RR: 18 (11-06-19 @ 00:21) (18 - 20)  SpO2: 96% (11-06-19 @ 00:21) (95% - 99%)  Wt(kg): --      LABORATORY:                        11.0   5.09  )-----------( 165      ( 05 Nov 2019 01:07 )             31.3     11-05    129<L>  |  92<L>  |  23  ----------------------------<  167<H>  4.1   |  21<L>  |  1.10    Ca    8.7      05 Nov 2019 01:07    TPro  6.4  /  Alb  4.2  /  TBili  0.8  /  DBili  x   /  AST  21  /  ALT  29  /  AlkPhos  60  11-05      MICROBIOLOGY:   Urinalysis (11.05.19 @ 01:42)    Glucose Qualitative, Urine: Negative    Blood, Urine: Negative    pH Urine: 5.5    Color: Yellow    Urine Appearance: Clear    Bilirubin: Negative    Ketone - Urine: Trace    Specific Gravity: 1.020    Protein, Urine: Trace    Urobilinogen: 2 mg/dL    Nitrite: Negative    Leukocyte Esterase Concentration: Negative      RADIOLOGY:  Xray Chest 1 View- PORTABLE-Urgent (11.05.19 @ 01:01)  IMPRESSION:   Clear lungs.       MEDICATIONS:   acyclovir IVPB 750 milliGRAM(s) IV Intermittent every 8 hours  apixaban 5 milliGRAM(s) Oral two times a day  artificial tears (preservative free) Ophthalmic Solution 1 Drop(s) Left EYE every 2 hours  cyclopentolate 1% Solution 1 Drop(s) Left EYE three times a day  dextrose 5%. 1000 milliLiter(s) IV Continuous <Continuous>  dextrose 50% Injectable 12.5 Gram(s) IV Push once  dextrose 50% Injectable 25 Gram(s) IV Push once  dextrose 50% Injectable 25 Gram(s) IV Push once  erythromycin   Ointment 1 Application(s) Left EYE three times a day  finasteride 5 milliGRAM(s) Oral daily  insulin lispro (HumaLOG) corrective regimen sliding scale   SubCutaneous three times a day before meals  insulin lispro (HumaLOG) corrective regimen sliding scale   SubCutaneous at bedtime  lisinopril 2.5 milliGRAM(s) Oral daily  metoprolol tartrate 100 milliGRAM(s) Oral daily  metoprolol tartrate 50 milliGRAM(s) Oral daily  pantoprazole    Tablet 40 milliGRAM(s) Oral before breakfast  prednisoLONE acetate 1% Suspension 2 Drop(s) Left EYE four times a day  sodium chloride 0.9%. 1000 milliLiter(s) IV Continuous <Continuous>      PHYSICAL EXAM:  Constitutional: awake and alert, in NAD, comfortable and non-toxic appearing  ENT: swelling and erythema around left eye, +lesions to left face  Resp: CTA b/l, respirations even and non-labored   CV: S1/S2, RRR  GI: soft, nontender, nondistended  Ext: No LE edema  Skin: scattered crusted lesions to chest      ASSESSMENT/PLAN:   HPI:  Pt   c/o   feeling very tired, fatigued,        and    with  3 days of rash across his L face/eye     . pt was in [facility] ,  (pt came straight from airport)    . Pt was evaluated by eye doctor a few days ago due to eye redness and was told he had conjuctivitis and given eye drops. his generalized symptoms worsened and then he developed a rash around his L face and eye and went back to eye doctor and was told he had shingles and was prescribed acyclovir drops. pt was "cleared" to [place] but symptoms worsened    . per  family,  he looks very fatigued and mildly confused  . pt denies any cp, sob, cough, n/v/d, urinary symptoms. (05 Nov 2019 09:00)      # Neutropenic Fever  - Tylenol and cooling measures prn for pyrexia  - BC x2, UA/UC  - Chest x-ray  - Continue with   - Continue IV hydration  - Monitor vital signs q4hr.  Will endorse to primary team in AM.    Zofia Fan PA-C  Department of Medicine  # CC: Fever    Event Summary:  Called by RN to evaluate pt with rectal temp of 102.8F. Pt seen and examined at bedside, in NAD, daughter present in room. Patient with no acute complaints. Denies c/o CP, SOB, palpitations, HA, cough, diaphoresis, chills, N/V/D, abd pain or dysuria.      VITAL SIGNS:  T(C): 38.3 (11-06-19 @ 00:21), Max: 39.3 (11-05-19 @ 23:22)  HR: 80 (11-06-19 @ 00:21) (80 - 118)  BP: 107/60 (11-06-19 @ 00:21) (107/60 - 131/65)  RR: 18 (11-06-19 @ 00:21) (18 - 20)  SpO2: 96% (11-06-19 @ 00:21) (95% - 99%)  Wt(kg): --      LABORATORY:                        11.0   5.09  )-----------( 165      ( 05 Nov 2019 01:07 )             31.3     11-05    129<L>  |  92<L>  |  23  ----------------------------<  167<H>  4.1   |  21<L>  |  1.10    Ca    8.7      05 Nov 2019 01:07    TPro  6.4  /  Alb  4.2  /  TBili  0.8  /  DBili  x   /  AST  21  /  ALT  29  /  AlkPhos  60  11-05      MICROBIOLOGY:   Urinalysis (11.05.19 @ 01:42)    Glucose Qualitative, Urine: Negative    Blood, Urine: Negative    pH Urine: 5.5    Color: Yellow    Urine Appearance: Clear    Bilirubin: Negative    Ketone - Urine: Trace    Specific Gravity: 1.020    Protein, Urine: Trace    Urobilinogen: 2 mg/dL    Nitrite: Negative    Leukocyte Esterase Concentration: Negative      RADIOLOGY:  Xray Chest 1 View- PORTABLE-Urgent (11.05.19 @ 01:01)  IMPRESSION:   Clear lungs.       MEDICATIONS:   acyclovir IVPB 750 milliGRAM(s) IV Intermittent every 8 hours  apixaban 5 milliGRAM(s) Oral two times a day  artificial tears (preservative free) Ophthalmic Solution 1 Drop(s) Left EYE every 2 hours  cyclopentolate 1% Solution 1 Drop(s) Left EYE three times a day  dextrose 5%. 1000 milliLiter(s) IV Continuous <Continuous>  dextrose 50% Injectable 12.5 Gram(s) IV Push once  dextrose 50% Injectable 25 Gram(s) IV Push once  dextrose 50% Injectable 25 Gram(s) IV Push once  erythromycin   Ointment 1 Application(s) Left EYE three times a day  finasteride 5 milliGRAM(s) Oral daily  insulin lispro (HumaLOG) corrective regimen sliding scale   SubCutaneous three times a day before meals  insulin lispro (HumaLOG) corrective regimen sliding scale   SubCutaneous at bedtime  lisinopril 2.5 milliGRAM(s) Oral daily  metoprolol tartrate 100 milliGRAM(s) Oral daily  metoprolol tartrate 50 milliGRAM(s) Oral daily  pantoprazole    Tablet 40 milliGRAM(s) Oral before breakfast  prednisoLONE acetate 1% Suspension 2 Drop(s) Left EYE four times a day  sodium chloride 0.9%. 1000 milliLiter(s) IV Continuous <Continuous>      PHYSICAL EXAM:  Constitutional: awake and alert, in NAD, comfortable and non-toxic appearing  ENT: swelling and erythema around left eye, +lesions to left face  Resp: CTA b/l, respirations even and non-labored   CV: S1/S2, RRR  GI: soft, nontender, nondistended  Ext: No LE edema  Skin: scattered crusted lesions to chest      ASSESSMENT/PLAN:   85 yo male with lymphoma, DM,HTN,and A Fib admitted with left facial HSV of ocular involvement with what appears to be cutaneous dissemination.    # Fever, likely 2/2 zoster  - Tylenol and cooling measures prn for pyrexia.  - Blood cultures (sent 11/5) pending. UA neg.  - Chest x-ray (11/5) with clear lungs.  - Continue with IV acyclovir.  - ID following, appreciate recs.  - Continue IV hydration.  Will endorse to primary team in AM.      Zofia Fan PA-C  Department of Medicine  #[id number]

## 2019-11-06 NOTE — PROGRESS NOTE ADULT - SUBJECTIVE AND OBJECTIVE BOX
febrile/  no chills    REVIEW OF SYSTEMS:  GEN: no fever,    no chills  RESP: no SOB,   no cough  CVS: no chest pain,   no palpitations  GI: no abdominal pain,   no nausea,   no vomiting,   no constipation,   no diarrhea  : no dysuria,   no frequency  NEURO: no headache,   no dizziness  PSYCH: no depression,   not anxious  Derm : no rash    MEDICATIONS  (STANDING):  acyclovir IVPB 750 milliGRAM(s) IV Intermittent every 8 hours  apixaban 5 milliGRAM(s) Oral two times a day  artificial tears (preservative free) Ophthalmic Solution 1 Drop(s) Left EYE every 2 hours  cyclopentolate 1% Solution 1 Drop(s) Left EYE three times a day  dextrose 5%. 1000 milliLiter(s) (50 mL/Hr) IV Continuous <Continuous>  dextrose 50% Injectable 12.5 Gram(s) IV Push once  dextrose 50% Injectable 25 Gram(s) IV Push once  dextrose 50% Injectable 25 Gram(s) IV Push once  erythromycin   Ointment 1 Application(s) Left EYE three times a day  finasteride 5 milliGRAM(s) Oral daily  insulin lispro (HumaLOG) corrective regimen sliding scale   SubCutaneous three times a day before meals  insulin lispro (HumaLOG) corrective regimen sliding scale   SubCutaneous at bedtime  lisinopril 2.5 milliGRAM(s) Oral daily  metoprolol tartrate 100 milliGRAM(s) Oral daily  metoprolol tartrate 50 milliGRAM(s) Oral daily  pantoprazole    Tablet 40 milliGRAM(s) Oral before breakfast  prednisoLONE acetate 1% Suspension 2 Drop(s) Left EYE four times a day  sodium chloride 0.9%. 1000 milliLiter(s) (50 mL/Hr) IV Continuous <Continuous>    MEDICATIONS  (PRN):  acetaminophen   Tablet .. 650 milliGRAM(s) Oral every 6 hours PRN Temp greater or equal to 38C (100.4F), Mild Pain (1 - 3)  dextrose 40% Gel 15 Gram(s) Oral once PRN Blood Glucose LESS THAN 70 milliGRAM(s)/deciliter  glucagon  Injectable 1 milliGRAM(s) IntraMuscular once PRN Glucose LESS THAN 70 milligrams/deciliter      Vital Signs Last 24 Hrs  T(C): 38.3 (2019 05:23), Max: 39.3 (2019 23:22)  T(F): 101 (2019 05:23), Max: 102.8 (2019 23:22)  HR: 92 (2019 05:23) (80 - 95)  BP: 116/68 (2019 05:23) (107/60 - 131/65)  BP(mean): --  RR: 18 (2019 05:23) (18 - 18)  SpO2: 98% (2019 05:23) (95% - 99%)  CAPILLARY BLOOD GLUCOSE      POCT Blood Glucose.: 149 mg/dL (2019 07:44)  POCT Blood Glucose.: 115 mg/dL (2019 21:19)  POCT Blood Glucose.: 136 mg/dL (2019 18:03)  POCT Blood Glucose.: 141 mg/dL (2019 12:32)    I&O's Summary    2019 07:01  -  2019 07:00  --------------------------------------------------------  IN: 0 mL / OUT: 550 mL / NET: -550 mL        PHYSICAL EXAM:  HEAD:  Atraumatic, Normocephalic  NECK: Supple, No   JVD  CHEST/LUNG:   no     rales,     no,    rhonchi  HEART: Regular rate and rhythm;         murmur  ABDOMEN: Soft, Nontender, ;   EXTREMITIES:    no  edema  NEUROLOGY:  alert  left facial  zoster    LABS:                        11.0   5.09  )-----------( 165      ( :07 )             31.3     11-06    130<L>  |  94<L>  |  13  ----------------------------<  136<H>  3.9   |  23  |  0.98    Ca    8.6      2019 05:46    TPro  6.4  /  Alb  4.2  /  TBili  0.8  /  DBili  x   /  AST  21  /  ALT  29  /  AlkPhos  60  11-05    PT/INR - ( 2019 01:07 )   PT: 15.4 sec;   INR: 1.33 ratio         PTT - ( 2019 01:07 )  PTT:31.5 sec      Urinalysis Basic - ( 2019 01:42 )    Color: Yellow / Appearance: Clear / S.020 / pH: x  Gluc: x / Ketone: Trace  / Bili: Negative / Urobili: 2 mg/dL   Blood: x / Protein: Trace / Nitrite: Negative   Leuk Esterase: Negative / RBC: 2 /hpf / WBC 2 /HPF   Sq Epi: x / Non Sq Epi: 1 /hpf / Bacteria: Few           @ 01:07  3.7  26              Consultant(s) Notes Reviewed:      Care Discussed with Consultants/Other Providers:

## 2019-11-06 NOTE — PROGRESS NOTE ADULT - ASSESSMENT
84  yr  w/ pmh        HTN,   HLD,    DM (glipizide),   CKD. , CAD  on ct, . AF  on eliquis,  diastolic  ch       marginal  zone lymphoma ,    s/p chemo by dr brown,  in 2018 .  h/o  anemia/ low platelets              admitted  with  left face/  eyelids,  h  zoster/  with crusted lesions,  ,     face / V1  distrubution, trigeminal  nerve   no lesions,  left  ear  eyes.  no  dendritic  corneal  lesions  per  ophthalmology  on iv Acyclovir   DM,  follow fs   AF , on   asa/ Eliquis/  lopressor  bid/  lisinopril/ echo  ef  56     ID  eval noted   seen by  opthalmology /  local rx   as  per  ophthalmology   hyponatremia. , improving   febrile  now/  awaiting ID  f/p/  blood  c/s,  negtaive  labs pending      spoke  to  wife         < from: CT Chest No Cont (06.02.19 @ 20:18) >  IMPRESSION:Small bilateral pleural effusions, left greater than right.  Interlobular septal thickening representing pulmonary edema.  Small pericarial effusion.  Coronary artery atherosclerotic disease.  < end of copied text > 84  yr  w/ pmh        HTN,   HLD,    DM (glipizide),   CKD. , CAD  on ct, . AF  on eliquis,  diastolic  ch       marginal  zone lymphoma ,    s/p chemo by dr brown,  earlier  this  year .  h/o  anemia/ low platelets              admitted  with  left face/  eyelids,  h  zoster/  with crusted lesions,  ,     face / V1  distrubution, trigeminal  nerve   no lesions,  left  ear  eyes.  no  dendritic  corneal  lesions  per  ophthalmology  on iv Acyclovir / sattered  lesions. not vesicular   on legs  DM,  follow fs   AF , on   asa/ Eliquis/  lopressor  bid/  lisinopril/ echo  ef  56     ID  eval noted   seen by  opthalmology /  local rx   as  per  ophthalmology   hyponatremia. , improving   febrile  now/  awaiting ID  f/p/  blood  c/s,  negtaive  labs pending      spoke  to  wife         < from: CT Chest No Cont (06.02.19 @ 20:18) >  IMPRESSION:Small bilateral pleural effusions, left greater than right.  Interlobular septal thickening representing pulmonary edema.  Small pericarial effusion.  Coronary artery atherosclerotic disease.  < end of copied text >

## 2019-11-06 NOTE — PROGRESS NOTE ADULT - SUBJECTIVE AND OBJECTIVE BOX
CARDIOLOGY     PROGRESS  NOTE   ________________________________________________    CHIEF COMPLAINT:Patient is a 84y old  Male who presents with a chief complaint of zoster (05 Nov 2019 11:25)  doing better.  	  REVIEW OF SYSTEMS:  CONSTITUTIONAL: No fever, weight loss, or fatigue, redness and swelling on the face  EYES: No eye pain, visual disturbances, or discharge  ENT:  No difficulty hearing, tinnitus, vertigo; No sinus or throat pain  NECK: No pain or stiffness  RESPIRATORY: No cough, wheezing, chills or hemoptysis; No Shortness of Breath  CARDIOVASCULAR: No chest pain, palpitations, passing out, dizziness, or leg swelling  GASTROINTESTINAL: No abdominal or epigastric pain. No nausea, vomiting, or hematemesis; No diarrhea or constipation. No melena or hematochezia.  GENITOURINARY: No dysuria, frequency, hematuria, or incontinence  NEUROLOGICAL: No headaches, memory loss, loss of strength, numbness, or tremors  SKIN: No itching, burning, rashes, or lesions   LYMPH Nodes: No enlarged glands  ENDOCRINE: No heat or cold intolerance; No hair loss  MUSCULOSKELETAL: No joint pain or swelling; No muscle, back, or extremity pain  PSYCHIATRIC: No depression, anxiety, mood swings, or difficulty sleeping  HEME/LYMPH: No easy bruising, or bleeding gums  ALLERGY AND IMMUNOLOGIC: No hives or eczema	    [ ] All others negative	  [ ] Unable to obtain    PHYSICAL EXAM:  T(C): 38.3 (11-06-19 @ 05:23), Max: 39.3 (11-05-19 @ 23:22)  HR: 92 (11-06-19 @ 05:23) (80 - 95)  BP: 116/68 (11-06-19 @ 05:23) (107/60 - 131/65)  RR: 18 (11-06-19 @ 05:23) (18 - 18)  SpO2: 98% (11-06-19 @ 05:23) (95% - 99%)  Wt(kg): --  I&O's Summary    05 Nov 2019 07:01  -  06 Nov 2019 07:00  --------------------------------------------------------  IN: 0 mL / OUT: 550 mL / NET: -550 mL        Appearance: zoster on the face  HEENT:   Normal oral mucosa, PERRL, EOMI	  Lymphatic: No lymphadenopathy  Cardiovascular: Normal S1 S2, No JVD, + murmurs, No edema  Respiratory: Lungs clear to auscultation	  Psychiatry: A & O x 3, Mood & affect appropriate  Gastrointestinal:  Soft, Non-tender, + BS	  Skin: No rashes, No ecchymoses, No cyanosis	  Neurologic: Non-focal  Extremities: Normal range of motion, No clubbing, cyanosis or edema  Vascular: Peripheral pulses palpable 2+ bilaterally    MEDICATIONS  (STANDING):  acyclovir IVPB 750 milliGRAM(s) IV Intermittent every 8 hours  apixaban 5 milliGRAM(s) Oral two times a day  artificial tears (preservative free) Ophthalmic Solution 1 Drop(s) Left EYE every 2 hours  cyclopentolate 1% Solution 1 Drop(s) Left EYE three times a day  dextrose 5%. 1000 milliLiter(s) (50 mL/Hr) IV Continuous <Continuous>  dextrose 50% Injectable 12.5 Gram(s) IV Push once  dextrose 50% Injectable 25 Gram(s) IV Push once  dextrose 50% Injectable 25 Gram(s) IV Push once  erythromycin   Ointment 1 Application(s) Left EYE three times a day  finasteride 5 milliGRAM(s) Oral daily  insulin lispro (HumaLOG) corrective regimen sliding scale   SubCutaneous three times a day before meals  insulin lispro (HumaLOG) corrective regimen sliding scale   SubCutaneous at bedtime  lisinopril 2.5 milliGRAM(s) Oral daily  metoprolol tartrate 100 milliGRAM(s) Oral daily  metoprolol tartrate 50 milliGRAM(s) Oral daily  pantoprazole    Tablet 40 milliGRAM(s) Oral before breakfast  prednisoLONE acetate 1% Suspension 2 Drop(s) Left EYE four times a day  sodium chloride 0.9%. 1000 milliLiter(s) (50 mL/Hr) IV Continuous <Continuous>      TELEMETRY: 	    ECG:  	  RADIOLOGY:  OTHER: 	  	  LABS:	 	    CARDIAC MARKERS:                                11.0   5.09  )-----------( 165      ( 05 Nov 2019 01:07 )             31.3     11-06    130<L>  |  94<L>  |  13  ----------------------------<  136<H>  3.9   |  23  |  0.98    Ca    8.6      06 Nov 2019 05:46    TPro  6.4  /  Alb  4.2  /  TBili  0.8  /  DBili  x   /  AST  21  /  ALT  29  /  AlkPhos  60  11-05    proBNP:   Lipid Profile:   HgA1c:   TSH:   PT/INR - ( 05 Nov 2019 01:07 )   PT: 15.4 sec;   INR: 1.33 ratio         PTT - ( 05 Nov 2019 01:07 )  PTT:31.5 sec      Assessment and plan  ---------------------------  a.fib hr is over all controlled  cardiomyopathy, continue cardiac meds  herpes Zoster continue abx  oob to chair  physical therapy  continue ac

## 2019-11-06 NOTE — PROGRESS NOTE ADULT - ASSESSMENT
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  Given his age, medical co-morbidities,lymphoma and rituxan therapy I would favor IV treatment to start.  He has facial involvement and cutaneous dissemination.He does not have any overt pulmonary, liver, or CNS involvement.  While fever to this degree is unusual with uncomplicated zoster, with his cutaneous dissemination I suspect a viremia has accounted for fever.  His urine isolate may be a colonizer but if fevers do not resolve will add GNR coverage.He is voiding without dysuria and much stronger than yesterday.  Suggest:  1.IV ACV 10,mg/kg q8  2.Keep hydrated with observation for fluid overload  3.daily BMP, ACV can crystalize in renal tubules  4.Blood cultures x 2, hold off on additional antibiotics, fever likely reflects viremia, low threshold to cover GNR in urine.  5.ID issues reviewed with wife at bedside

## 2019-11-06 NOTE — CONSULT NOTE ADULT - SUBJECTIVE AND OBJECTIVE BOX
Chief Complaint:  Patient is a 84y old  Male who presents with a chief complaint of zoster, fatigue  (2019 14:14)      HPI: Patient is a 84y male with a past history of A Fib, CHF, DM, HTN, HLD, and marginal zone lymphoma , s/p 4 cycles of chemo in mid (bendamustine and rituxan) who came to ER after flying back from [facility] for treatment of left facial zoster. He was hospitalized in early  for treatment of CHF. He completed 4 cycles of bendamustine and rituxan in 2019. He went to [place] earlier in October and developed mouth pain followed by eye pain last week. He was initially given antibiotics by dentist, saw additional people when a facial rash developed, eventually was diagnosed as zoster and his family flew him back to [place] last night.He came strait to the hospital from the airport, had a 102.9 fever, was given fluids, a dose of zosyn, and started on IV ACV. optho has seen, no deep ocular involvement. He is alert, denies any headache, is able to follow commands. He has about 20 scattered lesions over body, trunk and all 4 extremities. His left facial lesions have crusted.      GI consulted to address anemia  per wife, pt with poor PO intake and fatigue for the past week. Reports that patient has chronic anemia.   Followed by  - heme/onc   pt denies n/v/d/c abdominal pain, black stools  Last colonoscopy 5 years ago with Dr. Ramirez, reportedly unremarkable.             Allergies:  No Known Allergies      Medications:  acetaminophen   Tablet .. 650 milliGRAM(s) Oral every 6 hours PRN  acyclovir IVPB 750 milliGRAM(s) IV Intermittent every 8 hours  apixaban 5 milliGRAM(s) Oral two times a day  artificial tears (preservative free) Ophthalmic Solution 1 Drop(s) Left EYE every 2 hours  cyclopentolate 1% Solution 1 Drop(s) Left EYE three times a day  dextrose 40% Gel 15 Gram(s) Oral once PRN  dextrose 5%. 1000 milliLiter(s) IV Continuous <Continuous>  dextrose 50% Injectable 12.5 Gram(s) IV Push once  dextrose 50% Injectable 25 Gram(s) IV Push once  dextrose 50% Injectable 25 Gram(s) IV Push once  erythromycin   Ointment 1 Application(s) Left EYE three times a day  finasteride 5 milliGRAM(s) Oral daily  glucagon  Injectable 1 milliGRAM(s) IntraMuscular once PRN  insulin lispro (HumaLOG) corrective regimen sliding scale   SubCutaneous three times a day before meals  insulin lispro (HumaLOG) corrective regimen sliding scale   SubCutaneous at bedtime  lisinopril 2.5 milliGRAM(s) Oral daily  metoprolol tartrate 100 milliGRAM(s) Oral daily  metoprolol tartrate 50 milliGRAM(s) Oral daily  pantoprazole    Tablet 40 milliGRAM(s) Oral before breakfast  prednisoLONE acetate 1% Suspension 2 Drop(s) Left EYE four times a day  sodium chloride 0.9%. 1000 milliLiter(s) IV Continuous <Continuous>      PMHX/PSHX:  Atrial fibrillation  Anemia  DM2 (diabetes mellitus, type 2)  Osteoporosis  GERD (gastroesophageal reflux disease)  Benign prostatic hypertrophy  HTN (hypertension)  No significant past surgical history      Family history:      Social History:     ROS:     General:  No wt loss, fevers, chills, night sweats, +fatigue   Eyes:  Poor vision in L eye 2/2 swelling  ENT:  No sore throat, pain, runny nose, dysphagia  CV:  No pain, palpitations, hypo/hypertension  Resp:  No dyspnea, +dry cough, tachypnea, wheezing  GI:  No pain, No nausea, No vomiting, No diarrhea, No constipation, No weight loss, No fever, No pruritis, No rectal bleeding, No tarry stools, No dysphagia,  :  No pain, bleeding, incontinence, nocturia  Muscle:  No pain, weakness  Neuro:  No weakness, tingling, memory problems  Psych:  No fatigue, insomnia, mood problems, depression  Endocrine:  No polyuria, polydipsia, cold/heat intolerance  Heme:  No petechiae, ecchymosis, easy bruisability  Skin:  + rash, + crusted lesions surrounding L eye       PHYSICAL EXAM:   Vital Signs:  Vital Signs Last 24 Hrs  T(C): 36.7 (2019 11:43), Max: 39.3 (2019 23:22)  T(F): 98.1 (2019 11:43), Max: 102.8 (2019 23:22)  HR: 84 (2019 11:43) (80 - 92)  BP: 118/66 (2019 11:43) (107/60 - 118/66)  BP(mean): --  RR: 19 (2019 11:43) (18 - 19)  SpO2: 97% (2019 11:43) (95% - 98%)  Daily     Daily     GENERAL:  Lying in bed, no distress  HEENT:  NC/AT, rash and crust surrounding L eye   CHEST:  Full & symmetric excursion, no increased effort, breath sounds clear  HEART:  Regular rhythm, S1, S2, no murmur/rub/S3/S4, no abdominal bruit, no edema  ABDOMEN:  Soft, non-tender, non-distended, normoactive bowel sounds,  no masses ,no hepato-splenomegaly, no signs of chronic liver disease  EXTEREMITIES:  no cyanosis,clubbing or edema  SKIN:  No rash/erythema/ecchymoses/petechiae/wounds/abscess/warm/dry  NEURO:  Alert & oriented x 3    LABS:                        10.2   3.75  )-----------( 113      ( 2019 09:05 )             29.0     11-06    130<L>  |  94<L>  |  13  ----------------------------<  136<H>  3.9   |  23  |  0.98    Ca    8.6      2019 05:46    TPro  6.4  /  Alb  4.2  /  TBili  0.8  /  DBili  x   /  AST  21  /  ALT  29  /  AlkPhos  60  11-05    LIVER FUNCTIONS - ( 2019 01:07 )  Alb: 4.2 g/dL / Pro: 6.4 g/dL / ALK PHOS: 60 U/L / ALT: 29 U/L / AST: 21 U/L / GGT: x           PT/INR - ( 2019 01:07 )   PT: 15.4 sec;   INR: 1.33 ratio         PTT - ( 2019 01:07 )  PTT:31.5 sec  Urinalysis Basic - ( 2019 01:42 )    Color: Yellow / Appearance: Clear / S.020 / pH: x  Gluc: x / Ketone: Trace  / Bili: Negative / Urobili: 2 mg/dL   Blood: x / Protein: Trace / Nitrite: Negative   Leuk Esterase: Negative / RBC: 2 /hpf / WBC 2 /HPF   Sq Epi: x / Non Sq Epi: 1 /hpf / Bacteria: Few          Imaging:

## 2019-11-06 NOTE — CONSULT NOTE ADULT - PROBLEM SELECTOR RECOMMENDATION 9
suspect slight drop in Hgb dilutional   no overt signs of GIB   monitor H/H daily, transfuse prn  Anemia workup, iron studies pending  cont PPI PO for GI ppx   No GI contraindication to continue AC   monitor stool color closely while on AC   o/p f/u with Dr. Rosas, s/p chemo 2018 for marginal zone lymphoma

## 2019-11-06 NOTE — CONSULT NOTE ADULT - ASSESSMENT
Patient is a 84y male with a past history of A Fib, CHF, DM, HTN, HLD, and marginal zone lymphoma , s/p 4 cycles of chemo in mid 2019(bendamustine and rituxan) who came to ER after flying back from [place] for treatment of left facial zoster. GI requested to evaluate patient for anemia

## 2019-11-07 LAB
-  AMIKACIN: SIGNIFICANT CHANGE UP
-  AMPICILLIN/SULBACTAM: SIGNIFICANT CHANGE UP
-  AMPICILLIN: SIGNIFICANT CHANGE UP
-  AZTREONAM: SIGNIFICANT CHANGE UP
-  CEFAZOLIN: SIGNIFICANT CHANGE UP
-  CEFEPIME: SIGNIFICANT CHANGE UP
-  CEFOXITIN: SIGNIFICANT CHANGE UP
-  CEFTRIAXONE: SIGNIFICANT CHANGE UP
-  CIPROFLOXACIN: SIGNIFICANT CHANGE UP
-  ERTAPENEM: SIGNIFICANT CHANGE UP
-  GENTAMICIN: SIGNIFICANT CHANGE UP
-  IMIPENEM: SIGNIFICANT CHANGE UP
-  LEVOFLOXACIN: SIGNIFICANT CHANGE UP
-  MEROPENEM: SIGNIFICANT CHANGE UP
-  NITROFURANTOIN: SIGNIFICANT CHANGE UP
-  PIPERACILLIN/TAZOBACTAM: SIGNIFICANT CHANGE UP
-  TIGECYCLINE: SIGNIFICANT CHANGE UP
-  TOBRAMYCIN: SIGNIFICANT CHANGE UP
-  TRIMETHOPRIM/SULFAMETHOXAZOLE: SIGNIFICANT CHANGE UP
ANION GAP SERPL CALC-SCNC: 9 MMOL/L — SIGNIFICANT CHANGE UP (ref 5–17)
BUN SERPL-MCNC: 11 MG/DL — SIGNIFICANT CHANGE UP (ref 7–23)
CALCIUM SERPL-MCNC: 8.1 MG/DL — LOW (ref 8.4–10.5)
CHLORIDE SERPL-SCNC: 96 MMOL/L — SIGNIFICANT CHANGE UP (ref 96–108)
CO2 SERPL-SCNC: 25 MMOL/L — SIGNIFICANT CHANGE UP (ref 22–31)
CREAT SERPL-MCNC: 0.86 MG/DL — SIGNIFICANT CHANGE UP (ref 0.5–1.3)
CULTURE RESULTS: SIGNIFICANT CHANGE UP
FERRITIN SERPL-MCNC: 776 NG/ML — HIGH (ref 30–400)
FOLATE SERPL-MCNC: >20 NG/ML — SIGNIFICANT CHANGE UP
GLUCOSE BLDC GLUCOMTR-MCNC: 138 MG/DL — HIGH (ref 70–99)
GLUCOSE BLDC GLUCOMTR-MCNC: 149 MG/DL — HIGH (ref 70–99)
GLUCOSE BLDC GLUCOMTR-MCNC: 177 MG/DL — HIGH (ref 70–99)
GLUCOSE BLDC GLUCOMTR-MCNC: 200 MG/DL — HIGH (ref 70–99)
GLUCOSE SERPL-MCNC: 133 MG/DL — HIGH (ref 70–99)
HCT VFR BLD CALC: 26 % — LOW (ref 39–50)
HGB BLD-MCNC: 9.3 G/DL — LOW (ref 13–17)
IRON SATN MFR SERPL: 27 % — SIGNIFICANT CHANGE UP (ref 16–55)
IRON SATN MFR SERPL: 51 UG/DL — SIGNIFICANT CHANGE UP (ref 45–165)
MCHC RBC-ENTMCNC: 33 PG — SIGNIFICANT CHANGE UP (ref 27–34)
MCHC RBC-ENTMCNC: 35.8 GM/DL — SIGNIFICANT CHANGE UP (ref 32–36)
MCV RBC AUTO: 92.2 FL — SIGNIFICANT CHANGE UP (ref 80–100)
METHOD TYPE: SIGNIFICANT CHANGE UP
ORGANISM # SPEC MICROSCOPIC CNT: SIGNIFICANT CHANGE UP
ORGANISM # SPEC MICROSCOPIC CNT: SIGNIFICANT CHANGE UP
PLATELET # BLD AUTO: 107 K/UL — LOW (ref 150–400)
POTASSIUM SERPL-MCNC: 3.8 MMOL/L — SIGNIFICANT CHANGE UP (ref 3.5–5.3)
POTASSIUM SERPL-SCNC: 3.8 MMOL/L — SIGNIFICANT CHANGE UP (ref 3.5–5.3)
RAPID RVP RESULT: SIGNIFICANT CHANGE UP
RBC # BLD: 2.82 M/UL — LOW (ref 4.2–5.8)
RBC # FLD: 13.3 % — SIGNIFICANT CHANGE UP (ref 10.3–14.5)
SODIUM SERPL-SCNC: 130 MMOL/L — LOW (ref 135–145)
SPECIMEN SOURCE: SIGNIFICANT CHANGE UP
TIBC SERPL-MCNC: 186 UG/DL — LOW (ref 220–430)
UIBC SERPL-MCNC: 135 UG/DL — SIGNIFICANT CHANGE UP (ref 110–370)
VIT B12 SERPL-MCNC: 328 PG/ML — SIGNIFICANT CHANGE UP (ref 232–1245)
WBC # BLD: 2.57 K/UL — LOW (ref 3.8–10.5)
WBC # FLD AUTO: 2.57 K/UL — LOW (ref 3.8–10.5)

## 2019-11-07 RX ORDER — FLUTICASONE PROPIONATE 50 MCG
1 SPRAY, SUSPENSION NASAL
Refills: 0 | Status: DISCONTINUED | OUTPATIENT
Start: 2019-11-07 | End: 2019-11-08

## 2019-11-07 RX ORDER — ERYTHROPOIETIN 10000 [IU]/ML
10000 INJECTION, SOLUTION INTRAVENOUS; SUBCUTANEOUS ONCE
Refills: 0 | Status: COMPLETED | OUTPATIENT
Start: 2019-11-07 | End: 2019-11-07

## 2019-11-07 RX ADMIN — Medication 1 SPRAY(S): at 18:57

## 2019-11-07 RX ADMIN — Medication 100 MILLIGRAM(S): at 13:10

## 2019-11-07 RX ADMIN — Medication 2 DROP(S): at 06:19

## 2019-11-07 RX ADMIN — Medication 100 MILLIGRAM(S): at 06:19

## 2019-11-07 RX ADMIN — CYCLOPENTOLATE HYDROCHLORIDE 1 DROP(S): 10 SOLUTION/ DROPS OPHTHALMIC at 13:11

## 2019-11-07 RX ADMIN — Medication 1 DROP(S): at 06:19

## 2019-11-07 RX ADMIN — Medication 1 DROP(S): at 02:00

## 2019-11-07 RX ADMIN — Medication 1 APPLICATION(S): at 21:34

## 2019-11-07 RX ADMIN — LISINOPRIL 2.5 MILLIGRAM(S): 2.5 TABLET ORAL at 06:19

## 2019-11-07 RX ADMIN — Medication 1 DROP(S): at 17:05

## 2019-11-07 RX ADMIN — PANTOPRAZOLE SODIUM 40 MILLIGRAM(S): 20 TABLET, DELAYED RELEASE ORAL at 06:19

## 2019-11-07 RX ADMIN — CYCLOPENTOLATE HYDROCHLORIDE 1 DROP(S): 10 SOLUTION/ DROPS OPHTHALMIC at 06:20

## 2019-11-07 RX ADMIN — Medication 1 DROP(S): at 04:00

## 2019-11-07 RX ADMIN — Medication 2 DROP(S): at 17:05

## 2019-11-07 RX ADMIN — Medication 1 DROP(S): at 21:34

## 2019-11-07 RX ADMIN — Medication 165 MILLIGRAM(S): at 13:11

## 2019-11-07 RX ADMIN — Medication 1 APPLICATION(S): at 13:10

## 2019-11-07 RX ADMIN — Medication 165 MILLIGRAM(S): at 06:19

## 2019-11-07 RX ADMIN — Medication 50 MILLIGRAM(S): at 17:06

## 2019-11-07 RX ADMIN — Medication 1 DROP(S): at 20:50

## 2019-11-07 RX ADMIN — CYCLOPENTOLATE HYDROCHLORIDE 1 DROP(S): 10 SOLUTION/ DROPS OPHTHALMIC at 21:34

## 2019-11-07 RX ADMIN — SODIUM CHLORIDE 50 MILLILITER(S): 9 INJECTION INTRAMUSCULAR; INTRAVENOUS; SUBCUTANEOUS at 17:11

## 2019-11-07 RX ADMIN — Medication 1 DROP(S): at 08:23

## 2019-11-07 RX ADMIN — Medication 1 DROP(S): at 15:20

## 2019-11-07 RX ADMIN — Medication 1 DROP(S): at 10:09

## 2019-11-07 RX ADMIN — Medication 1: at 12:25

## 2019-11-07 RX ADMIN — Medication 1 DROP(S): at 11:35

## 2019-11-07 RX ADMIN — FINASTERIDE 5 MILLIGRAM(S): 5 TABLET, FILM COATED ORAL at 11:36

## 2019-11-07 RX ADMIN — Medication 2 DROP(S): at 11:36

## 2019-11-07 RX ADMIN — Medication 165 MILLIGRAM(S): at 22:06

## 2019-11-07 RX ADMIN — APIXABAN 5 MILLIGRAM(S): 2.5 TABLET, FILM COATED ORAL at 06:19

## 2019-11-07 RX ADMIN — Medication 1 APPLICATION(S): at 06:20

## 2019-11-07 RX ADMIN — APIXABAN 5 MILLIGRAM(S): 2.5 TABLET, FILM COATED ORAL at 17:05

## 2019-11-07 RX ADMIN — Medication 1 DROP(S): at 13:10

## 2019-11-07 RX ADMIN — ERYTHROPOIETIN 10000 UNIT(S): 10000 INJECTION, SOLUTION INTRAVENOUS; SUBCUTANEOUS at 17:11

## 2019-11-07 NOTE — PHYSICAL THERAPY INITIAL EVALUATION ADULT - GAIT DEVIATIONS NOTED, PT EVAL
decreased stride length/decreased weight-shifting ability/decreased angy/decreased step length/increased time in double stance

## 2019-11-07 NOTE — PROGRESS NOTE ADULT - SUBJECTIVE AND OBJECTIVE BOX
CC: f/u for disseminated zoster    Patient is resting in bed .He periorbital swelling is much improved , he is currently afebrile     REVIEW OF SYSTEMS:  All other review of systems negative (Comprehensive ROS)      Other Medications Reviewed    Vital Signs Last 24 Hrs  T(C): 36.7 (2019 11:41), Max: 36.9 (2019 16:33)  T(F): 98.1 (2019 11:41), Max: 98.4 (2019 16:33)  HR: 82 (2019 11:41) (82 - 87)  BP: 129/61 (2019 11:41) (129/61 - 145/73)  BP(mean): --  RR: 16 (2019 11:41) (16 - 18)  SpO2: 97% (2019 11:41) (95% - 97%)    PHYSICAL EXAM:  General: alert, no acute distress  Eyes:  anicteric, left sided conjunctival injection, no discharge  Oropharynx: no lesions or injection 	  Neck: supple, without adenopathy  Lungs: clear to auscultation  Heart: irregular rate and rhythm; no murmur, rubs or gallops  Abdomen: soft, nondistended, nontender, without mass or organomegaly  Skin: scattered Zoster lesions on trunk and extremities, healing facial lesions  Extremities: no clubbing, cyanosis, or edema  Neurologic: alert, oriented, moves all extremities    LAB RESULTS:                        9.3    2.57  )-----------( 107      ( 2019 09:29 )             26.0                           10.2   3.75  )-----------( 113      ( 2019 09:05 )             29.0     11-06    130<L>  |  94<L>  |  13  ----------------------------<  136<H>  3.9   |  23  |  0.98    Ca    8.6      2019 05:46    TPro  6.4  /  Alb  4.2  /  TBili  0.8  /  DBili  x   /  AST  21  /  ALT  29  /  AlkPhos  60  11-05    LIVER FUNCTIONS - ( 2019 01:07 )  Alb: 4.2 g/dL / Pro: 6.4 g/dL / ALK PHOS: 60 U/L / ALT: 29 U/L / AST: 21 U/L / GGT: x           Urinalysis Basic - ( 2019 01:42 )    Color: Yellow / Appearance: Clear / S.020 / pH: x  Gluc: x / Ketone: Trace  / Bili: Negative / Urobili: 2 mg/dL   Blood: x / Protein: Trace / Nitrite: Negative   Leuk Esterase: Negative / RBC: 2 /hpf / WBC 2 /HPF   Sq Epi: x / Non Sq Epi: 1 /hpf / Bacteria: Few      MICROBIOLOGY:  RECENT CULTURES:   @ 05:40 .Blood Blood-Peripheral     No growth to date.       @ 05:27 .Urine Clean Catch (Midstream)     10,000 - 49,000 CFU/mL Gram Negative Rods       @ 03:06 .Blood Blood-Peripheral     No growth to date.          RADIOLOGY REVIEWED:    < from: Xray Chest 1 View- PORTABLE-Urgent (19 @ 01:01) >    IMPRESSION:   Clear lungs.     < end of copied text >

## 2019-11-07 NOTE — CONSULT NOTE ADULT - SUBJECTIVE AND OBJECTIVE BOX
Patient came to hospital straight from airport landing from [facility] with tiredness, fatigue, rash across his L face/eyefor 3 days, told to have shingles  Patient has past history of A Fib, CHF, DM, HTN, HLD,and marginal zone lymphoma , s/p 4 cycles of chemo in mid 2019.  Had a 102.9 fever, was given fluids, a dose of zosyn and started on IV ACV. optho has seen, no deep ocular involvement. He is alert, denies any headache, is able to follow commands. He has about 20 scattered lesions over body, trunk amd all 4 extremities. His left facial lesions have crusted.  Hem consult because of cytopenias, wife stated he gets procrit with his primary hem/onc and also Rituxan every 2 months but was in remission from lymphoma       PAST MEDICAL & SURGICAL HISTORY:  Atrial fibrillation  Anemia  DM2 (diabetes mellitus, type 2)  Osteoporosis  GERD (gastroesophageal reflux disease)  Benign prostatic hypertrophy  HTN (hypertension)  No significant past surgical history    Meds:  acetaminophen   Tablet .. 650 milliGRAM(s) Oral every 6 hours PRN Temp greater or equal to 38C (100.4F), Mild Pain (1 - 3)  acyclovir IVPB 750 milliGRAM(s) IV Intermittent every 8 hours  apixaban 5 milliGRAM(s) Oral two times a day  artificial tears (preservative free) Ophthalmic Solution 1 Drop(s) Left EYE every 2 hours  cyclopentolate 1% Solution 1 Drop(s) Left EYE three times a day  dextrose 40% Gel 15 Gram(s) Oral once PRN Blood Glucose LESS THAN 70 milliGRAM(s)/deciliter  dextrose 5%. 1000 milliLiter(s) IV Continuous <Continuous>  dextrose 50% Injectable 12.5 Gram(s) IV Push once  dextrose 50% Injectable 25 Gram(s) IV Push once  dextrose 50% Injectable 25 Gram(s) IV Push once  erythromycin   Ointment 1 Application(s) Left EYE three times a day  finasteride 5 milliGRAM(s) Oral daily  glucagon  Injectable 1 milliGRAM(s) IntraMuscular once PRN Glucose LESS THAN 70 milligrams/deciliter  guaiFENesin   Syrup  (Sugar-Free) 100 milliGRAM(s) Oral every 6 hours PRN Cough  insulin lispro (HumaLOG) corrective regimen sliding scale   SubCutaneous three times a day before meals  insulin lispro (HumaLOG) corrective regimen sliding scale   SubCutaneous at bedtime  lisinopril 2.5 milliGRAM(s) Oral daily  metoprolol tartrate 100 milliGRAM(s) Oral daily  metoprolol tartrate 50 milliGRAM(s) Oral daily  pantoprazole    Tablet 40 milliGRAM(s) Oral before breakfast  prednisoLONE acetate 1% Suspension 2 Drop(s) Left EYE four times a day  sodium chloride 0.9%. 1000 milliLiter(s) IV Continuous <Continuous>    Labs:  CBC Full  -  ( 07 Nov 2019 09:29 )  WBC Count : 2.57 K/uL  Hemoglobin : 9.3 g/dL  Hematocrit : 26.0 %  Platelet Count - Automated : 107 K/uL  Mean Cell Volume : 92.2 fl  Mean Cell Hemoglobin : 33.0 pg  Mean Cell Hemoglobin Concentration : 35.8 gm/dL  Auto Neutrophil # : x  Auto Lymphocyte # : x  Auto Monocyte # : x  Auto Eosinophil # : x  Auto Basophil # : x  Auto Neutrophil % : x  Auto Lymphocyte % : x  Auto Monocyte % : x  Auto Eosinophil % : x  Auto Basophil % : x    Complete Blood Count + Automated Diff (11.05.19 @ 01:07)    WBC Count: 5.09 K/uL    RBC Count: 3.45 M/uL    Hemoglobin: 11.0 g/dL    Hematocrit: 31.3 %    Mean Cell Volume: 90.7 fl    Mean Cell Hemoglobin: 31.9 pg    Mean Cell Hemoglobin Conc: 35.1 gm/dL    Red Cell Distrib Width: 13.8 %    Platelet Count - Automated: 165 K/uL    Auto Neutrophil #: 3.26 K/uL    Auto Lymphocyte #: 1.17 K/uL    Auto Monocyte #: 0.66 K/uL    Auto Eosinophil #: 0.00 K/uL    Auto Basophil #: 0.00 K/uL    Auto Neutrophil %: 64.0: Differential percentages must be correlated with absolute numbers for  clinical significance. %    Auto Lymphocyte %: 23.0 %    Auto Monocyte %: 13.0 %    Auto Eosinophil %: 0.0 %    Auto Basophil %: 0.0 %        11-07    130<L>  |  96  |  11  ----------------------------<  133<H>  3.8   |  25  |  0.86    Ca    8.1<L>      07 Nov 2019 05:37    Ferritin, Serum in AM (11.07.19 @ 08:59)    Ferritin, Serum: 776 ng/mL    Vitamin B12, Serum in AM (11.07.19 @ 08:59)    Vitamin B12, Serum: 328 pg/mL        Radiology:     < from: CT Chest No Cont (06.02.19 @ 20:18) >  IMPRESSION:Small bilateral pleural effusions, left greater than right.    Interlobular septal thickening representing pulmonary edema.    Small pericardial effusion.    Coronary artery atherosclerotic disease.      < end of copied text >                  ROS:  Wife at bedside, states facial and eye lesions are lesser and swelling has decreased  No Sob or chest pain  No palpitations   No abdominal pain. No change in bowel habit. No blood in stools  General weakness  No leg swelling      Vital Signs Last 24 Hrs  T(C): 36.7 (07 Nov 2019 11:41), Max: 36.9 (06 Nov 2019 16:33)  T(F): 98.1 (07 Nov 2019 11:41), Max: 98.4 (06 Nov 2019 16:33)  HR: 82 (07 Nov 2019 11:41) (82 - 87)  BP: 129/61 (07 Nov 2019 11:41) (129/61 - 145/73)  BP(mean): --  RR: 16 (07 Nov 2019 11:41) (16 - 18)  SpO2: 97% (07 Nov 2019 11:41) (95% - 97%)    Physical Exam:  Left side of face and eye region with crusted zoster lesions  Neck supple, no LN's  Chest: bilateral breath sounds, no wheeze or rales  CVS: regular heart rate without murmur  Abdomen: soft, BS+, no massess or organomegaly  CNS: no gross deficit  Ext: no edema

## 2019-11-07 NOTE — PROGRESS NOTE ADULT - SUBJECTIVE AND OBJECTIVE BOX
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  Had a 102.9 fever, was given fluids, a dose of zosyn and started on IV ACV. optho has seen, no deep ocular involvement. He is alert, denies any headache, is able to follow commands. He has about 20 scattered lesions over body, trunk amd all 4 extremities. His left facial lesions have crusted.         PAST MEDICAL & SURGICAL HISTORY:  Atrial fibrillation  Anemia  DM2 (diabetes mellitus, type 2)  Osteoporosis  GERD (gastroesophageal reflux disease)  Benign prostatic hypertrophy  HTN (hypertension)  No significant past surgical history    Meds:  acetaminophen   Tablet .. 650 milliGRAM(s) Oral every 6 hours PRN Temp greater or equal to 38C (100.4F), Mild Pain (1 - 3)  acyclovir IVPB 750 milliGRAM(s) IV Intermittent every 8 hours  apixaban 5 milliGRAM(s) Oral two times a day  artificial tears (preservative free) Ophthalmic Solution 1 Drop(s) Left EYE every 2 hours  cyclopentolate 1% Solution 1 Drop(s) Left EYE three times a day  dextrose 40% Gel 15 Gram(s) Oral once PRN Blood Glucose LESS THAN 70 milliGRAM(s)/deciliter  dextrose 5%. 1000 milliLiter(s) IV Continuous <Continuous>  dextrose 50% Injectable 12.5 Gram(s) IV Push once  dextrose 50% Injectable 25 Gram(s) IV Push once  dextrose 50% Injectable 25 Gram(s) IV Push once  erythromycin   Ointment 1 Application(s) Left EYE three times a day  finasteride 5 milliGRAM(s) Oral daily  glucagon  Injectable 1 milliGRAM(s) IntraMuscular once PRN Glucose LESS THAN 70 milligrams/deciliter  guaiFENesin   Syrup  (Sugar-Free) 100 milliGRAM(s) Oral every 6 hours PRN Cough  insulin lispro (HumaLOG) corrective regimen sliding scale   SubCutaneous three times a day before meals  insulin lispro (HumaLOG) corrective regimen sliding scale   SubCutaneous at bedtime  lisinopril 2.5 milliGRAM(s) Oral daily  metoprolol tartrate 100 milliGRAM(s) Oral daily  metoprolol tartrate 50 milliGRAM(s) Oral daily  pantoprazole    Tablet 40 milliGRAM(s) Oral before breakfast  prednisoLONE acetate 1% Suspension 2 Drop(s) Left EYE four times a day  sodium chloride 0.9%. 1000 milliLiter(s) IV Continuous <Continuous>    Labs:  CBC Full  -  ( 07 Nov 2019 09:29 )  WBC Count : 2.57 K/uL  Hemoglobin : 9.3 g/dL  Hematocrit : 26.0 %  Platelet Count - Automated : 107 K/uL  Mean Cell Volume : 92.2 fl  Mean Cell Hemoglobin : 33.0 pg  Mean Cell Hemoglobin Concentration : 35.8 gm/dL  Auto Neutrophil # : x  Auto Lymphocyte # : x  Auto Monocyte # : x  Auto Eosinophil # : x  Auto Basophil # : x  Auto Neutrophil % : x  Auto Lymphocyte % : x  Auto Monocyte % : x  Auto Eosinophil % : x  Auto Basophil % : x    Complete Blood Count + Automated Diff (11.05.19 @ 01:07)    WBC Count: 5.09 K/uL    RBC Count: 3.45 M/uL    Hemoglobin: 11.0 g/dL    Hematocrit: 31.3 %    Mean Cell Volume: 90.7 fl    Mean Cell Hemoglobin: 31.9 pg    Mean Cell Hemoglobin Conc: 35.1 gm/dL    Red Cell Distrib Width: 13.8 %    Platelet Count - Automated: 165 K/uL    Auto Neutrophil #: 3.26 K/uL    Auto Lymphocyte #: 1.17 K/uL    Auto Monocyte #: 0.66 K/uL    Auto Eosinophil #: 0.00 K/uL    Auto Basophil #: 0.00 K/uL    Auto Neutrophil %: 64.0: Differential percentages must be correlated with absolute numbers for  clinical significance. %    Auto Lymphocyte %: 23.0 %    Auto Monocyte %: 13.0 %    Auto Eosinophil %: 0.0 %    Auto Basophil %: 0.0 %        11-07    130<L>  |  96  |  11  ----------------------------<  133<H>  3.8   |  25  |  0.86    Ca    8.1<L>      07 Nov 2019 05:37    Ferritin, Serum in AM (11.07.19 @ 08:59)    Ferritin, Serum: 776 ng/mL    Vitamin B12, Serum in AM (11.07.19 @ 08:59)    Vitamin B12, Serum: 328 pg/mL        Radiology:             ROS:  No pain, no fever  No lumps in neck or pain  No Sob or chest pain  No palpitations   No abdominal pain. No change in bowel habit. No blood in stools  No weakness in extremities  No leg swelling      Vital Signs Last 24 Hrs  T(C): 36.7 (07 Nov 2019 11:41), Max: 36.9 (06 Nov 2019 16:33)  T(F): 98.1 (07 Nov 2019 11:41), Max: 98.4 (06 Nov 2019 16:33)  HR: 82 (07 Nov 2019 11:41) (82 - 87)  BP: 129/61 (07 Nov 2019 11:41) (129/61 - 145/73)  BP(mean): --  RR: 16 (07 Nov 2019 11:41) (16 - 18)  SpO2: 97% (07 Nov 2019 11:41) (95% - 97%)    Physical Exam:  Patient comfortable  AXOX3  Neck supple, no LN's  Chest: bilateral breath sounds, no wheeze or rales  CVS: regular heart rate without murmur  Abdomen: soft, BS+, no massess or organomegaly  CNS: no gross deficit  Ext: no edema

## 2019-11-07 NOTE — PHYSICAL THERAPY INITIAL EVALUATION ADULT - PLANNED THERAPY INTERVENTIONS, PT EVAL
transfer training/gait training/strengthening/bed mobility training/stair negotiation GOAL: pt will ascend/descend 14 steps (I) with U HR and step to pattern in 4 weeks/balance training

## 2019-11-07 NOTE — PROGRESS NOTE ADULT - ASSESSMENT
84  yr  w/ pmh        HTN,   HLD,    DM (glipizide),   CKD. , CAD  on ct, . AF  on eliquis,  diastolic  ch       marginal  zone lymphoma ,    s/p chemo by dr brown,  in 2018 .  h/o  anemia/ low platelets              admitted  with  left face/  eyelids,  h  zoster/  with crusted lesions,  ,     face / V1  distrubution, trigeminal  nerve   no lesions,  left  ear  eyes.  no  dendritic  corneal  lesions  per  ophthalmology  on iv Acyclovir   DM,  follow fs   AF , on   asa/ Eliquis/  lopressor  bid/  lisinopril/ echo  ef  56     ID  eval noted   seen by  opthalmology /  local rx   as  per  ophthalmology   hyponatremia. , improving     blood  c/s,  negative /  rvp, negative  afebrile  now/  pancytopenia   labs  pending today    spoke  to  wife         < from: CT Chest No Cont (06.02.19 @ 20:18) >  IMPRESSION:Small bilateral pleural effusions, left greater than right.  Interlobular septal thickening representing pulmonary edema.  Small pericarial effusion.  Coronary artery atherosclerotic disease.  < end of copied text > 84  yr  w/ pmh        HTN,   HLD,    DM (glipizide),   CKD. , CAD  on ct, . AF  on eliquis,  diastolic  ch       marginal  zone lymphoma ,    s/p chemo by dr brown,  in 2018 , not on chemo at [facility]   .  h/o  anemia/ low platelets              admitted  with  left face/  eyelids,  h  zoster/  with crusted lesions,  ,     face / V1  distrubution, trigeminal  nerve   no lesions,  left  ear  eyes.  no  dendritic  corneal  lesions  per  ophthalmology  on iv Acyclovir   DM,  follow fs   AF , on   asa/ Eliquis/  lopressor  bid/  lisinopril/ echo  ef  56     ID  eval noted   seen by  opthalmology /  local rx   as  per  ophthalmology   hyponatremia. , improving     blood  c/s,  negative /  rvp, negative  anemia,  seen by gi  afebrile  now/  pancytopenia now/ heme eval  suspect pancytopenia  is  from  viral process     maculo papular lesions on extremities, not  typical  appearing  for  h  zoster    spoke  to  wife         < from: CT Chest No Cont (06.02.19 @ 20:18) >  IMPRESSION:Small bilateral pleural effusions, left greater than right.  Interlobular septal thickening representing pulmonary edema.  Small pericarial effusion.  Coronary artery atherosclerotic disease.  < end of copied text > 84  yr  w/ pmh        HTN,   HLD,    DM (glipizide),   CKD. , CAD  on ct, . AF  on eliquis,  diastolic  ch       marginal  zone lymphoma ,    s/p chemo by dr brown,  in 2018 , not on chemo at [facility]   .  h/o  anemia/ low platelets              admitted  with  left face/  eyelids,  h  zoster/  with crusted lesions,  ,     face / V1  distrubution, trigeminal  nerve   no lesions,  left  ear  eyes.  no  dendritic  corneal  lesions  per  ophthalmology  on iv Acyclovir   DM,  follow fs   AF , on   asa/ Eliquis/  lopressor  bid/  lisinopril/ echo  ef  56     ID  eval noted   seen by  opthalmology /  local rx   as  per  ophthalmology   hyponatremia. , improving     blood  c/s,  negative /  rvp, negative  anemia,  seen by gi  afebrile  now/  pancytopenia now/ heme eval  suspect pancytopenia  is  from  viral process/ some of  it is  chronic relate d to prior  malignancy     maculo papular lesions on extremities, not  typical  appearing  for  h  zoster    spoke  to  wife         < from: CT Chest No Cont (06.02.19 @ 20:18) >  IMPRESSION:Small bilateral pleural effusions, left greater than right.  Interlobular septal thickening representing pulmonary edema.  Small pericarial effusion.  Coronary artery atherosclerotic disease.  < end of copied text > 84  yr  w/        HTN,   HLD,    DM (glipizide),   CKD. , CAD  on ct, . AF  on eliquis,  diastolic  chf       marginal  zone lymphoma ,    s/p chemo by dr brown,  july/ 2019/   none  recently,  , not on chemo at present   .  h/o  anemia/ low platelets              admitted  with  left face/  eyelids,  h  zoster/  with crusted lesions,  ,     face / V1  distrubution, trigeminal  nerve /  noted  by pt  and  family. around  10/ 22/ 19,  while  in [facility]/    saw   a  dr  on 10/ 24/ 19, in [facility]  no lesions,  left  ear  eyes.  no  dendritic  corneal  lesions  per  ophthalmology/ superficial punctate  keratitis  noted  on iv Acyclovir   DM,  follow fs   AF , on   asa/ Eliquis/  lopressor  bid/  lisinopril/ echo  ef  56     ID  eval noted   seen by  opthalmology /  local rx   as  per  ophthalmology   hyponatremia. , improving     blood  c/s,  negative /  rvp, negative  anemia,  seen by gi  afebrile  now/  pancytopenia now/ heme eval  suspect pancytopenia  is  from  viral process/ some of  it is  chronic related  to prior  malignancy  pt has  zoster  for  about   15 days  or  more, pt  not expected to be  contagious  now   maculo papular lesions on extremities, not  typical  appearing  for  h  zoster    spoke  to  wife         < from: CT Chest No Cont (06.02.19 @ 20:18) >  IMPRESSION:Small bilateral pleural effusions, left greater than right.  Interlobular septal thickening representing pulmonary edema.  Small pericarial effusion.  Coronary artery atherosclerotic disease.  < end of copied text >

## 2019-11-07 NOTE — PROGRESS NOTE ADULT - SUBJECTIVE AND OBJECTIVE BOX
CARDIOLOGY     PROGRESS  NOTE   ________________________________________________    CHIEF COMPLAINT:Patient is a 84y old  Male who presents with a chief complaint of zoster, fatigue (06 Nov 2019 15:58)  no complain.  	  REVIEW OF SYSTEMS:  CONSTITUTIONAL: No fever, weight loss, or fatigue  EYES: No eye pain, visual disturbances, or discharge  ENT:  No difficulty hearing, tinnitus, vertigo; No sinus or throat pain  NECK: No pain or stiffness  RESPIRATORY: No cough, wheezing, chills or hemoptysis; + Shortness of Breath  CARDIOVASCULAR: No chest pain, palpitations, passing out, dizziness, or leg swelling  GASTROINTESTINAL: No abdominal or epigastric pain. No nausea, vomiting, or hematemesis; No diarrhea or constipation. No melena or hematochezia.  GENITOURINARY: No dysuria, frequency, hematuria, or incontinence  NEUROLOGICAL: No headaches, memory loss, loss of strength, numbness, or tremors  SKIN: No itching, burning, rashes, or lesions   LYMPH Nodes: No enlarged glands  ENDOCRINE: No heat or cold intolerance; No hair loss  MUSCULOSKELETAL: No joint pain or swelling; No muscle, back, or extremity pain  PSYCHIATRIC: No depression, anxiety, mood swings, or difficulty sleeping  HEME/LYMPH: No easy bruising, or bleeding gums  ALLERGY AND IMMUNOLOGIC: No hives or eczema	    [ ] All others negative	  [ ] Unable to obtain    PHYSICAL EXAM:  T(C): 36.5 (11-07-19 @ 05:07), Max: 36.9 (11-06-19 @ 16:33)  HR: 87 (11-07-19 @ 05:07) (82 - 87)  BP: 135/72 (11-07-19 @ 05:07) (118/66 - 145/73)  RR: 18 (11-07-19 @ 05:07) (17 - 19)  SpO2: 96% (11-07-19 @ 05:07) (95% - 97%)  Wt(kg): --  I&O's Summary    06 Nov 2019 07:01  -  07 Nov 2019 07:00  --------------------------------------------------------  IN: 2130 mL / OUT: 850 mL / NET: 1280 mL    07 Nov 2019 07:01  -  07 Nov 2019 08:27  --------------------------------------------------------  IN: 0 mL / OUT: 100 mL / NET: -100 mL        Appearance: Normal, facial redness is improving  HEENT:   Normal oral mucosa, PERRL, EOMI	  Lymphatic: No lymphadenopathy  Cardiovascular: Normal S1 S2, No JVD, + murmurs, No edema  Respiratory: Lungs clear to auscultation	  Psychiatry: A & O x 3, Mood & affect appropriate  Gastrointestinal:  Soft, Non-tender, + BS	  Skin: No rashes, No ecchymoses, No cyanosis	  Neurologic: Non-focal  Extremities: Normal range of motion, No clubbing, cyanosis or edema  Vascular: Peripheral pulses palpable 2+ bilaterally    MEDICATIONS  (STANDING):  acyclovir IVPB 750 milliGRAM(s) IV Intermittent every 8 hours  apixaban 5 milliGRAM(s) Oral two times a day  artificial tears (preservative free) Ophthalmic Solution 1 Drop(s) Left EYE every 2 hours  cyclopentolate 1% Solution 1 Drop(s) Left EYE three times a day  dextrose 5%. 1000 milliLiter(s) (50 mL/Hr) IV Continuous <Continuous>  dextrose 50% Injectable 12.5 Gram(s) IV Push once  dextrose 50% Injectable 25 Gram(s) IV Push once  dextrose 50% Injectable 25 Gram(s) IV Push once  erythromycin   Ointment 1 Application(s) Left EYE three times a day  finasteride 5 milliGRAM(s) Oral daily  insulin lispro (HumaLOG) corrective regimen sliding scale   SubCutaneous three times a day before meals  insulin lispro (HumaLOG) corrective regimen sliding scale   SubCutaneous at bedtime  lisinopril 2.5 milliGRAM(s) Oral daily  metoprolol tartrate 100 milliGRAM(s) Oral daily  metoprolol tartrate 50 milliGRAM(s) Oral daily  pantoprazole    Tablet 40 milliGRAM(s) Oral before breakfast  prednisoLONE acetate 1% Suspension 2 Drop(s) Left EYE four times a day  sodium chloride 0.9%. 1000 milliLiter(s) (50 mL/Hr) IV Continuous <Continuous>      TELEMETRY: 	    ECG:  	  RADIOLOGY:  OTHER: 	  	  LABS:	 	    CARDIAC MARKERS:                                10.2   3.75  )-----------( 113      ( 06 Nov 2019 09:05 )             29.0     11-07    130<L>  |  96  |  11  ----------------------------<  133<H>  3.8   |  25  |  0.86    Ca    8.1<L>      07 Nov 2019 05:37      proBNP:   Lipid Profile:   HgA1c: Hemoglobin A1C, Whole Blood: 7.3 % (11-06 @ 09:05)    TSH: Thyroid Stimulating Hormone, Serum: 1.25 uIU/mL (11-06 @ 08:51)          Assessment and plan  ---------------------------  chf/cardiomyopathy sec to chemo  a.fib hr is well controlled  continue ac  id follow up regarding abx  continue cardiac meds  dc ivf

## 2019-11-07 NOTE — PROGRESS NOTE ADULT - SUBJECTIVE AND OBJECTIVE BOX
INTERVAL HPI/OVERNIGHT EVENTS:    pt seen and examined at bedside with wife present  wife reports pt has poor PO intake and has not had a BM since monday  pt denies n/v/d/c abd pain hematochezia      MEDICATIONS  (STANDING):  acyclovir IVPB 750 milliGRAM(s) IV Intermittent every 8 hours  apixaban 5 milliGRAM(s) Oral two times a day  artificial tears (preservative free) Ophthalmic Solution 1 Drop(s) Left EYE every 2 hours  cyclopentolate 1% Solution 1 Drop(s) Left EYE three times a day  dextrose 5%. 1000 milliLiter(s) (50 mL/Hr) IV Continuous <Continuous>  dextrose 50% Injectable 12.5 Gram(s) IV Push once  dextrose 50% Injectable 25 Gram(s) IV Push once  dextrose 50% Injectable 25 Gram(s) IV Push once  erythromycin   Ointment 1 Application(s) Left EYE three times a day  finasteride 5 milliGRAM(s) Oral daily  insulin lispro (HumaLOG) corrective regimen sliding scale   SubCutaneous three times a day before meals  insulin lispro (HumaLOG) corrective regimen sliding scale   SubCutaneous at bedtime  lisinopril 2.5 milliGRAM(s) Oral daily  metoprolol tartrate 100 milliGRAM(s) Oral daily  metoprolol tartrate 50 milliGRAM(s) Oral daily  pantoprazole    Tablet 40 milliGRAM(s) Oral before breakfast  prednisoLONE acetate 1% Suspension 2 Drop(s) Left EYE four times a day  sodium chloride 0.9%. 1000 milliLiter(s) (50 mL/Hr) IV Continuous <Continuous>    MEDICATIONS  (PRN):  acetaminophen   Tablet .. 650 milliGRAM(s) Oral every 6 hours PRN Temp greater or equal to 38C (100.4F), Mild Pain (1 - 3)  dextrose 40% Gel 15 Gram(s) Oral once PRN Blood Glucose LESS THAN 70 milliGRAM(s)/deciliter  glucagon  Injectable 1 milliGRAM(s) IntraMuscular once PRN Glucose LESS THAN 70 milligrams/deciliter  guaiFENesin   Syrup  (Sugar-Free) 100 milliGRAM(s) Oral every 6 hours PRN Cough      Allergies    No Known Allergies    Intolerances        Review of Systems:    General:  No wt loss, fevers, chills, night sweats,fatigue,   Eyes:  Good vision, no reported pain  ENT:  No sore throat, pain, runny nose, dysphagia  CV:  No pain, palpitatioins, hypo/hypertension  Resp:  No dyspnea, +dry cough, tachypnea, wheezing  GI:  No pain, No nausea, No vomiting, No diarrhea, No constipatiion, No weight loss, No fever, No pruritis, No rectal bleeding, No tarry stools, No dysphagia,  :  No pain, bleeding, incontinence, nocturia  Muscle:  No pain, weakness  Neuro:  No weakness, tingling, memory problems  Psych:  No fatigue, insomnia, mood problems, depression  Endocrine:  No polyuria, polydypsia, cold/heat intolerance  Heme:  No petechiae, ecchymosis, easy bruisability  Skin:  +rash, + crusted lesions surrounding L eye       Vital Signs Last 24 Hrs  T(C): 36.7 (07 Nov 2019 11:41), Max: 36.9 (06 Nov 2019 16:33)  T(F): 98.1 (07 Nov 2019 11:41), Max: 98.4 (06 Nov 2019 16:33)  HR: 82 (07 Nov 2019 11:41) (82 - 87)  BP: 129/61 (07 Nov 2019 11:41) (129/61 - 145/73)  BP(mean): --  RR: 16 (07 Nov 2019 11:41) (16 - 18)  SpO2: 97% (07 Nov 2019 11:41) (95% - 97%)    PHYSICAL EXAM:    Constitutional: comfortably lying in bed, NAD   HEENT: EOMI, throat clear  Neck: No LAD, supple  Respiratory: CTA and P  Cardiovascular: S1 and S2, RRR, no M  Gastrointestinal: BS+, soft, NT/ND, neg HSM,  Extremities: No peripheral edema, neg clubing, cyanosis  Vascular: 2+ peripheral pulses  Neurological: A/O x 3, no focal deficits  Psychiatric: Normal mood, normal affect  Skin: + rash, + crusted lesions surrounding L eye       LABS:                        9.3    2.57  )-----------( 107      ( 07 Nov 2019 09:29 )             26.0     11-07    130<L>  |  96  |  11  ----------------------------<  133<H>  3.8   |  25  |  0.86    Ca    8.1<L>      07 Nov 2019 05:37            RADIOLOGY & ADDITIONAL TESTS:

## 2019-11-07 NOTE — PHYSICAL THERAPY INITIAL EVALUATION ADULT - PERTINENT HX OF CURRENT PROBLEM, REHAB EVAL
Patient is a 84y male with a past history of A Fib, CHF, DM, HTN, HLD, and marginal zone lymphoma , s/p 4 cycles of chemo in mid 2019(bendamustine and rituxan) who came to ER after flying back from [place] for treatment of left facial zoster.

## 2019-11-07 NOTE — CONSULT NOTE ADULT - ASSESSMENT
Patient came to hospital straight from airport landing from [facility] with tiredness, fatigue, rash across his L face/eyefor 3 days, told to have shingles  Patient has past history of A Fib, CHF, DM, HTN, HLD,and marginal zone lymphoma , s/p 4 cycles of chemo in mid 2019.  Had a 102.9 fever, was given fluids, a dose of zosyn and started on IV ACV. optho has seen, no deep ocular involvement. He is alert, denies any headache, is able to follow commands. He has about 20 scattered lesions over body, trunk amd all 4 extremities. His left facial lesions have crusted.  Hem consult because of cytopenias, wife stated he gets procrit with his primary hem/onc and also Rituxan wvery 2 months but was in remission from lymphoma  I told patients wife as was obvious to her that her husbands Zoster is improving.   His anemia has component of illness, hydration etc. 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 However dose will be less but can be repeated. Moreover because of illness he may not respond well and it takes time anyway.  His mild neutropenia and thrombocytopenia is likely related to viral illness and can be observed.  Follow ID instructions Patient came to hospital straight from airport landing from [facility] with tiredness, fatigue, rash across his L face/eyefor 3 days, told to have shingles  Patient has past history of A Fib, CHF, DM, HTN, HLD,and marginal zone lymphoma , s/p 4 cycles of chemo in mid 2019.  Had a 102.9 fever, was given fluids, a dose of zosyn and started on IV ACV. optho has seen, no deep ocular involvement. He is alert, denies any headache, is able to follow commands. He has about 20 scattered lesions over body, trunk amd all 4 extremities. His left facial lesions have crusted.  Hem consult because of cytopenias, wife stated he gets procrit with his primary hem/onc and also Rituxan wvery 2 months but was in remission from lymphoma  I told patients wife as was obvious to her that her husbands Zoster is improving.   His anemia has component of illness, hydration etc. 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 However dose will be less but can be repeated. Moreover because of illness he may not respond well and it takes time anyway.  His mild neutropenia and thrombocytopenia is likely related to viral illness and can be observed.  Last Rituxan was one month back and he gets it every 2 months and would not have been given with active infection anyway  Follow ID instructions

## 2019-11-07 NOTE — PROGRESS NOTE ADULT - SUBJECTIVE AND OBJECTIVE BOX
afebrile  today/  no  compalints    REVIEW OF SYSTEMS:  GEN: no fever,    no chills  RESP: no SOB,   no cough  CVS: no chest pain,   no palpitations  GI: no abdominal pain,   no nausea,   no vomiting,   no constipation,   no diarrhea  : no dysuria,   no frequency  NEURO: no headache,   no dizziness  PSYCH: no depression,   not anxious  Derm : no rash    MEDICATIONS  (STANDING):  acyclovir IVPB 750 milliGRAM(s) IV Intermittent every 8 hours  apixaban 5 milliGRAM(s) Oral two times a day  artificial tears (preservative free) Ophthalmic Solution 1 Drop(s) Left EYE every 2 hours  cyclopentolate 1% Solution 1 Drop(s) Left EYE three times a day  dextrose 5%. 1000 milliLiter(s) (50 mL/Hr) IV Continuous <Continuous>  dextrose 50% Injectable 12.5 Gram(s) IV Push once  dextrose 50% Injectable 25 Gram(s) IV Push once  dextrose 50% Injectable 25 Gram(s) IV Push once  erythromycin   Ointment 1 Application(s) Left EYE three times a day  finasteride 5 milliGRAM(s) Oral daily  insulin lispro (HumaLOG) corrective regimen sliding scale   SubCutaneous three times a day before meals  insulin lispro (HumaLOG) corrective regimen sliding scale   SubCutaneous at bedtime  lisinopril 2.5 milliGRAM(s) Oral daily  metoprolol tartrate 100 milliGRAM(s) Oral daily  metoprolol tartrate 50 milliGRAM(s) Oral daily  pantoprazole    Tablet 40 milliGRAM(s) Oral before breakfast  prednisoLONE acetate 1% Suspension 2 Drop(s) Left EYE four times a day  sodium chloride 0.9%. 1000 milliLiter(s) (50 mL/Hr) IV Continuous <Continuous>    MEDICATIONS  (PRN):  acetaminophen   Tablet .. 650 milliGRAM(s) Oral every 6 hours PRN Temp greater or equal to 38C (100.4F), Mild Pain (1 - 3)  dextrose 40% Gel 15 Gram(s) Oral once PRN Blood Glucose LESS THAN 70 milliGRAM(s)/deciliter  glucagon  Injectable 1 milliGRAM(s) IntraMuscular once PRN Glucose LESS THAN 70 milligrams/deciliter  guaiFENesin   Syrup  (Sugar-Free) 100 milliGRAM(s) Oral every 6 hours PRN Cough      Vital Signs Last 24 Hrs  T(C): 36.5 (07 Nov 2019 05:07), Max: 36.9 (06 Nov 2019 16:33)  T(F): 97.7 (07 Nov 2019 05:07), Max: 98.4 (06 Nov 2019 16:33)  HR: 87 (07 Nov 2019 05:07) (82 - 87)  BP: 135/72 (07 Nov 2019 05:07) (118/66 - 145/73)  BP(mean): --  RR: 18 (07 Nov 2019 05:07) (17 - 19)  SpO2: 96% (07 Nov 2019 05:07) (95% - 97%)  CAPILLARY BLOOD GLUCOSE      POCT Blood Glucose.: 138 mg/dL (07 Nov 2019 08:13)  POCT Blood Glucose.: 179 mg/dL (06 Nov 2019 22:35)  POCT Blood Glucose.: 134 mg/dL (06 Nov 2019 17:32)  POCT Blood Glucose.: 186 mg/dL (06 Nov 2019 12:06)    I&O's Summary    06 Nov 2019 07:01  -  07 Nov 2019 07:00  --------------------------------------------------------  IN: 2130 mL / OUT: 850 mL / NET: 1280 mL    07 Nov 2019 07:01  -  07 Nov 2019 08:36  --------------------------------------------------------  IN: 0 mL / OUT: 100 mL / NET: -100 mL        PHYSICAL EXAM:  HEAD:  Atraumatic, Normocephalic  NECK: Supple, No   JVD  CHEST/LUNG:   no     rales,     no,    rhonchi  HEART: Regular rate and rhythm;         murmur  ABDOMEN: Soft, Nontender, ;   EXTREMITIES:   no     edema  NEUROLOGY:  alert  healing  zoster, left  face    LABS:                        10.2   3.75  )-----------( 113      ( 06 Nov 2019 09:05 )             29.0     11-07    130<L>  |  96  |  11  ----------------------------<  133<H>  3.8   |  25  |  0.86    Ca    8.1<L>      07 Nov 2019 05:37                    Hemoglobin A1C, Whole Blood: 7.3 % (11-06 @ 09:05)    Thyroid Stimulating Hormone, Serum: 1.25 uIU/mL (11-06 @ 08:51)          Consultant(s) Notes Reviewed:      Care Discussed with Consultants/Other Providers: afebrile  today/  no  compalints  less  swelling  left face/  no  active  vesicles    REVIEW OF SYSTEMS:  GEN: no fever,    no chills  RESP: no SOB,   no cough  CVS: no chest pain,   no palpitations  GI: no abdominal pain,   no nausea,   no vomiting,   no constipation,   no diarrhea  : no dysuria,   no frequency  NEURO: no headache,   no dizziness  PSYCH: no depression,   not anxious    MEDICATIONS  (STANDING):  acyclovir IVPB 750 milliGRAM(s) IV Intermittent every 8 hours  apixaban 5 milliGRAM(s) Oral two times a day  artificial tears (preservative free) Ophthalmic Solution 1 Drop(s) Left EYE every 2 hours  cyclopentolate 1% Solution 1 Drop(s) Left EYE three times a day  dextrose 5%. 1000 milliLiter(s) (50 mL/Hr) IV Continuous <Continuous>  dextrose 50% Injectable 12.5 Gram(s) IV Push once  dextrose 50% Injectable 25 Gram(s) IV Push once  dextrose 50% Injectable 25 Gram(s) IV Push once  erythromycin   Ointment 1 Application(s) Left EYE three times a day  finasteride 5 milliGRAM(s) Oral daily  insulin lispro (HumaLOG) corrective regimen sliding scale   SubCutaneous three times a day before meals  insulin lispro (HumaLOG) corrective regimen sliding scale   SubCutaneous at bedtime  lisinopril 2.5 milliGRAM(s) Oral daily  metoprolol tartrate 100 milliGRAM(s) Oral daily  metoprolol tartrate 50 milliGRAM(s) Oral daily  pantoprazole    Tablet 40 milliGRAM(s) Oral before breakfast  prednisoLONE acetate 1% Suspension 2 Drop(s) Left EYE four times a day  sodium chloride 0.9%. 1000 milliLiter(s) (50 mL/Hr) IV Continuous <Continuous>    MEDICATIONS  (PRN):  acetaminophen   Tablet .. 650 milliGRAM(s) Oral every 6 hours PRN Temp greater or equal to 38C (100.4F), Mild Pain (1 - 3)  dextrose 40% Gel 15 Gram(s) Oral once PRN Blood Glucose LESS THAN 70 milliGRAM(s)/deciliter  glucagon  Injectable 1 milliGRAM(s) IntraMuscular once PRN Glucose LESS THAN 70 milligrams/deciliter  guaiFENesin   Syrup  (Sugar-Free) 100 milliGRAM(s) Oral every 6 hours PRN Cough      Vital Signs Last 24 Hrs  T(C): 36.5 (07 Nov 2019 05:07), Max: 36.9 (06 Nov 2019 16:33)  T(F): 97.7 (07 Nov 2019 05:07), Max: 98.4 (06 Nov 2019 16:33)  HR: 87 (07 Nov 2019 05:07) (82 - 87)  BP: 135/72 (07 Nov 2019 05:07) (118/66 - 145/73)  BP(mean): --  RR: 18 (07 Nov 2019 05:07) (17 - 19)  SpO2: 96% (07 Nov 2019 05:07) (95% - 97%)  CAPILLARY BLOOD GLUCOSE      POCT Blood Glucose.: 138 mg/dL (07 Nov 2019 08:13)  POCT Blood Glucose.: 179 mg/dL (06 Nov 2019 22:35)  POCT Blood Glucose.: 134 mg/dL (06 Nov 2019 17:32)  POCT Blood Glucose.: 186 mg/dL (06 Nov 2019 12:06)    I&O's Summary    06 Nov 2019 07:01  -  07 Nov 2019 07:00  --------------------------------------------------------  IN: 2130 mL / OUT: 850 mL / NET: 1280 mL    07 Nov 2019 07:01  -  07 Nov 2019 08:36  --------------------------------------------------------  IN: 0 mL / OUT: 100 mL / NET: -100 mL        PHYSICAL EXAM:  HEAD:  Atraumatic, Normocephalic  NECK: Supple, No   JVD  CHEST/LUNG:   no     rales,     no,    rhonchi  HEART: Regular rate and rhythm;         murmur  ABDOMEN: Soft, Nontender, ;   EXTREMITIES:   no     edema  NEUROLOGY:  alert  healing  zoster, left  face    LABS:                        10.2   3.75  )-----------( 113      ( 06 Nov 2019 09:05 )             29.0     11-07    130<L>  |  96  |  11  ----------------------------<  133<H>  3.8   |  25  |  0.86    Ca    8.1<L>      07 Nov 2019 05:37                    Hemoglobin A1C, Whole Blood: 7.3 % (11-06 @ 09:05)    Thyroid Stimulating Hormone, Serum: 1.25 uIU/mL (11-06 @ 08:51)          Consultant(s) Notes Reviewed:      Care Discussed with Consultants/Other Providers:

## 2019-11-07 NOTE — PHYSICAL THERAPY INITIAL EVALUATION ADULT - ADDITIONAL COMMENTS
Pt resides in a  with his spouse 3 steps to enter, 13 steps to second floor. Pt was ambulating independently without use of an AD prior and was independent with all ADLS PTA

## 2019-11-07 NOTE — PROGRESS NOTE ADULT - ASSESSMENT
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  Given his age, medical co-morbidities,lymphoma and rituxan therapy he was started on  IV treatment   He has facial involvement and cutaneous dissemination.  The patient clinically has improved the swelling in his left side of his face has improved  he is currently afebrile   blood culture so far are no growth   reviewed his urine cx reported growing morganella but the patient does not have any  symptoms no dysuria so likely asymptomatic bacteruria   The family at the bedside reports that he has be on chronic daily suppression antibiotics because he has history of UTI's, he does not remember the name of the antibiotics but it has helped prevent  UTI 's       Suggest:  1.	 Day # 3 of 7 IV acyclovir for zoster   2.	 ID issues reviewed with wife at bedside

## 2019-11-07 NOTE — PROGRESS NOTE ADULT - PROBLEM SELECTOR PLAN 1
monitor H/H daily, transfuse prn  slight decline in Hgb today, trend daily   no overt signs of GIB   Anemia workup reviewed - heme/onc eval pending   cont PPI PO for GI ppx   No GI contraindication to continue AC   monitor stool color closely while on AC   o/p f/u scheduled with Dr. 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

## 2019-11-08 ENCOUNTER — TRANSCRIPTION ENCOUNTER (OUTPATIENT)
Age: 84
End: 2019-11-08

## 2019-11-08 LAB
ANION GAP SERPL CALC-SCNC: 13 MMOL/L — SIGNIFICANT CHANGE UP (ref 5–17)
BUN SERPL-MCNC: 10 MG/DL — SIGNIFICANT CHANGE UP (ref 7–23)
CALCIUM SERPL-MCNC: 8.3 MG/DL — LOW (ref 8.4–10.5)
CHLORIDE SERPL-SCNC: 92 MMOL/L — LOW (ref 96–108)
CO2 SERPL-SCNC: 25 MMOL/L — SIGNIFICANT CHANGE UP (ref 22–31)
CREAT SERPL-MCNC: 0.85 MG/DL — SIGNIFICANT CHANGE UP (ref 0.5–1.3)
GLUCOSE BLDC GLUCOMTR-MCNC: 154 MG/DL — HIGH (ref 70–99)
GLUCOSE BLDC GLUCOMTR-MCNC: 171 MG/DL — HIGH (ref 70–99)
GLUCOSE BLDC GLUCOMTR-MCNC: 194 MG/DL — HIGH (ref 70–99)
GLUCOSE BLDC GLUCOMTR-MCNC: 233 MG/DL — HIGH (ref 70–99)
GLUCOSE SERPL-MCNC: 140 MG/DL — HIGH (ref 70–99)
HCT VFR BLD CALC: 25.2 % — LOW (ref 39–50)
HGB BLD-MCNC: 9 G/DL — LOW (ref 13–17)
MCHC RBC-ENTMCNC: 32.1 PG — SIGNIFICANT CHANGE UP (ref 27–34)
MCHC RBC-ENTMCNC: 35.7 GM/DL — SIGNIFICANT CHANGE UP (ref 32–36)
MCV RBC AUTO: 90 FL — SIGNIFICANT CHANGE UP (ref 80–100)
PLATELET # BLD AUTO: 118 K/UL — LOW (ref 150–400)
POTASSIUM SERPL-MCNC: 4 MMOL/L — SIGNIFICANT CHANGE UP (ref 3.5–5.3)
POTASSIUM SERPL-SCNC: 4 MMOL/L — SIGNIFICANT CHANGE UP (ref 3.5–5.3)
RBC # BLD: 2.8 M/UL — LOW (ref 4.2–5.8)
RBC # FLD: 13.2 % — SIGNIFICANT CHANGE UP (ref 10.3–14.5)
SODIUM SERPL-SCNC: 130 MMOL/L — LOW (ref 135–145)
WBC # BLD: 2.41 K/UL — LOW (ref 3.8–10.5)
WBC # FLD AUTO: 2.41 K/UL — LOW (ref 3.8–10.5)

## 2019-11-08 RX ORDER — DIPHENHYDRAMINE HYDROCHLORIDE AND LIDOCAINE HYDROCHLORIDE AND ALUMINUM HYDROXIDE AND MAGNESIUM HYDRO
10 KIT
Refills: 0 | Status: DISCONTINUED | OUTPATIENT
Start: 2019-11-08 | End: 2019-11-13

## 2019-11-08 RX ORDER — LISINOPRIL 2.5 MG/1
5 TABLET ORAL DAILY
Refills: 0 | Status: DISCONTINUED | OUTPATIENT
Start: 2019-11-08 | End: 2019-11-13

## 2019-11-08 RX ADMIN — CYCLOPENTOLATE HYDROCHLORIDE 1 DROP(S): 10 SOLUTION/ DROPS OPHTHALMIC at 13:37

## 2019-11-08 RX ADMIN — LISINOPRIL 2.5 MILLIGRAM(S): 2.5 TABLET ORAL at 05:52

## 2019-11-08 RX ADMIN — Medication 1 DROP(S): at 05:51

## 2019-11-08 RX ADMIN — Medication 1 DROP(S): at 16:39

## 2019-11-08 RX ADMIN — Medication 1 APPLICATION(S): at 05:51

## 2019-11-08 RX ADMIN — Medication 1: at 17:33

## 2019-11-08 RX ADMIN — Medication 50 MILLIGRAM(S): at 17:33

## 2019-11-08 RX ADMIN — Medication 1 DROP(S): at 22:53

## 2019-11-08 RX ADMIN — Medication 1: at 08:43

## 2019-11-08 RX ADMIN — DIPHENHYDRAMINE HYDROCHLORIDE AND LIDOCAINE HYDROCHLORIDE AND ALUMINUM HYDROXIDE AND MAGNESIUM HYDRO 10 MILLILITER(S): KIT at 18:45

## 2019-11-08 RX ADMIN — Medication 100 MILLIGRAM(S): at 05:52

## 2019-11-08 RX ADMIN — FINASTERIDE 5 MILLIGRAM(S): 5 TABLET, FILM COATED ORAL at 12:56

## 2019-11-08 RX ADMIN — Medication 2 DROP(S): at 22:52

## 2019-11-08 RX ADMIN — PANTOPRAZOLE SODIUM 40 MILLIGRAM(S): 20 TABLET, DELAYED RELEASE ORAL at 05:52

## 2019-11-08 RX ADMIN — Medication 1 DROP(S): at 12:54

## 2019-11-08 RX ADMIN — CYCLOPENTOLATE HYDROCHLORIDE 1 DROP(S): 10 SOLUTION/ DROPS OPHTHALMIC at 05:51

## 2019-11-08 RX ADMIN — Medication 1 APPLICATION(S): at 22:52

## 2019-11-08 RX ADMIN — Medication 2 DROP(S): at 01:01

## 2019-11-08 RX ADMIN — Medication 2 DROP(S): at 05:52

## 2019-11-08 RX ADMIN — Medication 1 APPLICATION(S): at 13:37

## 2019-11-08 RX ADMIN — Medication 1 DROP(S): at 22:51

## 2019-11-08 RX ADMIN — APIXABAN 5 MILLIGRAM(S): 2.5 TABLET, FILM COATED ORAL at 17:33

## 2019-11-08 RX ADMIN — Medication 2: at 12:53

## 2019-11-08 RX ADMIN — Medication 2 DROP(S): at 12:56

## 2019-11-08 RX ADMIN — APIXABAN 5 MILLIGRAM(S): 2.5 TABLET, FILM COATED ORAL at 05:51

## 2019-11-08 RX ADMIN — Medication 1 DROP(S): at 13:36

## 2019-11-08 RX ADMIN — CYCLOPENTOLATE HYDROCHLORIDE 1 DROP(S): 10 SOLUTION/ DROPS OPHTHALMIC at 22:51

## 2019-11-08 RX ADMIN — Medication 2 DROP(S): at 17:34

## 2019-11-08 RX ADMIN — Medication 165 MILLIGRAM(S): at 14:42

## 2019-11-08 RX ADMIN — Medication 165 MILLIGRAM(S): at 05:51

## 2019-11-08 RX ADMIN — Medication 1 DROP(S): at 08:00

## 2019-11-08 RX ADMIN — Medication 165 MILLIGRAM(S): at 22:50

## 2019-11-08 RX ADMIN — Medication 1 DROP(S): at 17:33

## 2019-11-08 RX ADMIN — Medication 1 DROP(S): at 22:07

## 2019-11-08 NOTE — PROGRESS NOTE ADULT - SUBJECTIVE AND OBJECTIVE BOX
no  compalints    REVIEW OF SYSTEMS:  GEN: no fever,    no chills  RESP: no SOB,   no cough  CVS: no chest pain,   no palpitations  GI: no abdominal pain,   no nausea,   no vomiting,   no constipation,   no diarrhea  : no dysuria,   no frequency  NEURO: no headache,   no dizziness  PSYCH: no depression,   not anxious    MEDICATIONS  (STANDING):  acyclovir IVPB 750 milliGRAM(s) IV Intermittent every 8 hours  apixaban 5 milliGRAM(s) Oral two times a day  artificial tears (preservative free) Ophthalmic Solution 1 Drop(s) Left EYE every 2 hours  cyclopentolate 1% Solution 1 Drop(s) Left EYE three times a day  dextrose 5%. 1000 milliLiter(s) (50 mL/Hr) IV Continuous <Continuous>  dextrose 50% Injectable 12.5 Gram(s) IV Push once  dextrose 50% Injectable 25 Gram(s) IV Push once  dextrose 50% Injectable 25 Gram(s) IV Push once  erythromycin   Ointment 1 Application(s) Left EYE three times a day  finasteride 5 milliGRAM(s) Oral daily  insulin lispro (HumaLOG) corrective regimen sliding scale   SubCutaneous three times a day before meals  insulin lispro (HumaLOG) corrective regimen sliding scale   SubCutaneous at bedtime  lisinopril 2.5 milliGRAM(s) Oral daily  metoprolol tartrate 100 milliGRAM(s) Oral daily  metoprolol tartrate 50 milliGRAM(s) Oral daily  pantoprazole    Tablet 40 milliGRAM(s) Oral before breakfast  prednisoLONE acetate 1% Suspension 2 Drop(s) Left EYE four times a day    MEDICATIONS  (PRN):  acetaminophen   Tablet .. 650 milliGRAM(s) Oral every 6 hours PRN Temp greater or equal to 38C (100.4F), Mild Pain (1 - 3)  dextrose 40% Gel 15 Gram(s) Oral once PRN Blood Glucose LESS THAN 70 milliGRAM(s)/deciliter  fluticasone propionate 50 MICROgram(s)/spray Nasal Spray 1 Spray(s) Both Nostrils two times a day PRN congestion  glucagon  Injectable 1 milliGRAM(s) IntraMuscular once PRN Glucose LESS THAN 70 milligrams/deciliter  guaiFENesin   Syrup  (Sugar-Free) 100 milliGRAM(s) Oral every 6 hours PRN Cough      Vital Signs Last 24 Hrs  T(C): 36.7 (08 Nov 2019 05:23), Max: 36.7 (07 Nov 2019 11:41)  T(F): 98 (08 Nov 2019 05:23), Max: 98.1 (07 Nov 2019 11:41)  HR: 87 (08 Nov 2019 05:23) (80 - 90)  BP: 159/72 (08 Nov 2019 05:23) (129/61 - 159/72)  BP(mean): --  RR: 18 (08 Nov 2019 05:23) (16 - 18)  SpO2: 97% (08 Nov 2019 05:23) (96% - 97%)  CAPILLARY BLOOD GLUCOSE      POCT Blood Glucose.: 154 mg/dL (08 Nov 2019 08:13)  POCT Blood Glucose.: 177 mg/dL (07 Nov 2019 22:02)  POCT Blood Glucose.: 149 mg/dL (07 Nov 2019 18:36)  POCT Blood Glucose.: 200 mg/dL (07 Nov 2019 12:18)    I&O's Summary    07 Nov 2019 07:01  -  08 Nov 2019 07:00  --------------------------------------------------------  IN: 1270 mL / OUT: 1350 mL / NET: -80 mL    08 Nov 2019 07:01  -  08 Nov 2019 08:59  --------------------------------------------------------  IN: 0 mL / OUT: 250 mL / NET: -250 mL        PHYSICAL EXAM:  HEAD:  Atraumatic, Normocephalic  NECK: Supple, No   JVD  CHEST/LUNG:   no     rales,     no,    rhonchi  HEART: Regular rate and rhythm;         murmur  ABDOMEN: Soft, Nontender, ;   EXTREMITIES:     no   edema  NEUROLOGY:  alert  skin. resolving  zoster  on face/  few  scattered   macular  lesions  on legs/  not  sure   if  this represents disseminated   zoster    LABS:                        9.0    2.41  )-----------( 118      ( 08 Nov 2019 08:07 )             25.2     11-08    130<L>  |  92<L>  |  10  ----------------------------<  140<H>  4.0   |  25  |  0.85    Ca    8.3<L>      08 Nov 2019 05:57                    Hemoglobin A1C, Whole Blood: 7.3 % (11-06 @ 09:05)    Thyroid Stimulating Hormone, Serum: 1.25 uIU/mL (11-06 @ 08:51)          Consultant(s) Notes Reviewed:      Care Discussed with Consultants/Other Providers:

## 2019-11-08 NOTE — DISCHARGE NOTE PROVIDER - HOSPITAL COURSE
Patient came to hospital straight from airport landing from [facility] with tiredness, fatigue, rash across his L face/eye for 3 days, told he had shingles    Patient has past history of A Fib, CHF, DM, HTN, HLD, and marginal zone lymphoma , s/p 4 cycles of chemo in mid 2019.  Had a 102.9 fever, was given fluids, a dose of zosyn and started on IV ACV. optho has seen, no deep ocular involvement. . He had about 20 scattered lesions over body, trunk and all 4 extremities. His left facial lesions were crusted.    While in hospital, completed 7 day course of IV Acyclovir - Isolation was discontinued 11/11/19    Cleared for discharge (PEREZ) on 11/11/19

## 2019-11-08 NOTE — PROGRESS NOTE ADULT - SUBJECTIVE AND OBJECTIVE BOX
Patient came to hospital straight from airport landing from [facility] with tiredness, fatigue, rash across his L face/eyefor 3 days, told to have shingles  Patient has past history of A Fib, CHF, DM, HTN, HLD,and marginal zone lymphoma , s/p 4 cycles of chemo in mid 2019.  Had a 102.9 fever, was given fluids, a dose of zosyn and started on IV ACV. optho has seen, no deep ocular involvement. He is alert, denies any headache, is able to follow commands. He has about 20 scattered lesions over body, trunk amd all 4 extremities. His left facial lesions have crusted.  Hem consult because of cytopenias, wife stated he gets procrit with his primary hem/onc and also Rituxan every 2 months but was in remission from lymphoma           PAST MEDICAL & SURGICAL HISTORY:  Atrial fibrillation  Anemia  DM2 (diabetes mellitus, type 2)  Osteoporosis  GERD (gastroesophageal reflux disease)  Benign prostatic hypertrophy  HTN (hypertension)  No significant past surgical history    Medications:  acetaminophen   Tablet .. 650 milliGRAM(s) Oral every 6 hours PRN Temp greater or equal to 38C (100.4F), Mild Pain (1 - 3)  acyclovir IVPB 750 milliGRAM(s) IV Intermittent every 8 hours  apixaban 5 milliGRAM(s) Oral two times a day  artificial tears (preservative free) Ophthalmic Solution 1 Drop(s) Left EYE every 2 hours  cyclopentolate 1% Solution 1 Drop(s) Left EYE three times a day  dextrose 40% Gel 15 Gram(s) Oral once PRN Blood Glucose LESS THAN 70 milliGRAM(s)/deciliter  dextrose 5%. 1000 milliLiter(s) IV Continuous <Continuous>  dextrose 50% Injectable 12.5 Gram(s) IV Push once  dextrose 50% Injectable 25 Gram(s) IV Push once  dextrose 50% Injectable 25 Gram(s) IV Push once  erythromycin   Ointment 1 Application(s) Left EYE three times a day  finasteride 5 milliGRAM(s) Oral daily  FIRST- Mouthwash  BLM 10 milliLiter(s) Swish and Spit two times a day  glucagon  Injectable 1 milliGRAM(s) IntraMuscular once PRN Glucose LESS THAN 70 milligrams/deciliter  guaiFENesin   Syrup  (Sugar-Free) 100 milliGRAM(s) Oral every 6 hours PRN Cough  insulin lispro (HumaLOG) corrective regimen sliding scale   SubCutaneous three times a day before meals  insulin lispro (HumaLOG) corrective regimen sliding scale   SubCutaneous at bedtime  lisinopril 5 milliGRAM(s) Oral daily  metoprolol tartrate 100 milliGRAM(s) Oral daily  metoprolol tartrate 50 milliGRAM(s) Oral daily  pantoprazole    Tablet 40 milliGRAM(s) Oral before breakfast  prednisoLONE acetate 1% Suspension 2 Drop(s) Left EYE four times a day    Labs:  CBC Full  -  ( 08 Nov 2019 08:07 )  WBC Count : 2.41 K/uL  Hemoglobin : 9.0 g/dL  Hematocrit : 25.2 %  Platelet Count - Automated : 118 K/uL  Mean Cell Volume : 90.0 fl  Mean Cell Hemoglobin : 32.1 pg  Mean Cell Hemoglobin Concentration : 35.7 gm/dL  Auto Neutrophil # : x  Auto Lymphocyte # : x  Auto Monocyte # : x  Auto Eosinophil # : x  Auto Basophil # : x  Auto Neutrophil % : x  Auto Lymphocyte % : x  Auto Monocyte % : x  Auto Eosinophil % : x  Auto Basophil % : x    11-08    130<L>  |  92<L>  |  10  ----------------------------<  140<H>  4.0   |  25  |  0.85    Ca    8.3<L>      08 Nov 2019 05:57        Radiology:             ROS:  Patient comfortable without distress, general weakness  No SOB or chest pain  No palpitation  No abdominal pain, diarrhaea or constipation  Facial swelling and lesions decreasing  Vital Signs Last 24 Hrs  T(C): 36.8 (08 Nov 2019 12:29), Max: 36.8 (08 Nov 2019 12:29)  T(F): 98.2 (08 Nov 2019 12:29), Max: 98.2 (08 Nov 2019 12:29)  HR: 75 (08 Nov 2019 13:02) (75 - 90)  BP: 155/87 (08 Nov 2019 13:02) (144/71 - 159/72)  BP(mean): --  RR: 18 (08 Nov 2019 12:29) (17 - 18)  SpO2: 97% (08 Nov 2019 13:02) (96% - 97%)    Physical exam:    No distress  CVS: S1, S2 regular or murmur  Chest: bilateral breath sound without rales  Abdomen: soft, not tender, no organomegaly or masses  Facial swelling and lesions decreasing    Assessment and Plan:

## 2019-11-08 NOTE — DISCHARGE NOTE PROVIDER - NSDCMRMEDTOKEN_GEN_ALL_CORE_FT
apixaban 5 mg oral tablet: 1 tab(s) orally every 12 hours  esomeprazole 40 mg oral delayed release capsule: 1 cap(s) orally once a day (in the morning)  finasteride 5 mg oral tablet: 1 tab(s) orally once a day  folic acid 1 mg oral tablet: 1 tab(s) orally 2 times a day  furosemide 40 mg oral tablet: 1 tab(s) orally every other day   Lasix 20 mg oral tablet: 1 tab(s) orally every other day   lisinopril 2.5 mg oral tablet: 1 tab(s) orally once a day  lovastatin 40 mg oral tablet: 1 tab(s) orally once a day  metFORMIN 500 mg oral tablet: 1 tab(s) orally 2 times a day  Metoprolol Tartrate 100 mg oral tablet: 1 tab(s) orally once a day (in the morning)  Metoprolol Tartrate 50 mg oral tablet: 1 tab(s) orally once a day (in the evening)  predniSONE 1 mg oral tablet: 4 tab(s) orally once a day  trimethoprim 100 mg oral tablet: 1 tab(s) orally once a day apixaban 5 mg oral tablet: 1 tab(s) orally every 12 hours  erythromycin 0.5% ophthalmic ointment: 1 application to each affected eye 3 times a day  esomeprazole 40 mg oral delayed release capsule: 1 cap(s) orally once a day (in the morning)  finasteride 5 mg oral tablet: 1 tab(s) orally once a day  folic acid 1 mg oral tablet: 1 tab(s) orally 2 times a day  freetext medication     -: Ure-Na 15 gram(s) orally 2 times a day  gabapentin 300 mg oral capsule: 1 cap(s) orally 2 times a day  guaiFENesin 100 mg/5 mL oral liquid: 5 milliliter(s) orally every 6 hours, As needed, Cough  lisinopril 5 mg oral tablet: 1 tab(s) orally once a day  lovastatin 40 mg oral tablet: 1 tab(s) orally once a day  metFORMIN 500 mg oral tablet: 1 tab(s) orally 2 times a day  metoprolol tartrate 100 mg oral tablet: 1 tab(s) orally once a day  metoprolol tartrate 75 mg oral tablet: 1 tab(s) orally once a day (at bedtime)  ocular lubricant ophthalmic solution: 1 drop(s) to each affected eye every 2 hours  prednisoLONE acetate 1% ophthalmic suspension: 2 drop(s) to each affected eye 4 times a day  trimethoprim 100 mg oral tablet: 1 tab(s) orally once a day

## 2019-11-08 NOTE — PROGRESS NOTE ADULT - ASSESSMENT
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  Given his age, medical co-morbidities,lymphoma and rituxan therapy he was started on  IV treatment   He has facial involvement and cutaneous dissemination.  The patient clinically has improved the swelling in his left side of his face has improved  blood culture so far are no growth   reviewed his urine cx reported growing morganella but the patient does not have any  symptoms no dysuria so likely asymptomatic bacteruria   The family at the bedside reports that he has be on chronic daily suppression antibiotics because he has history of UTI's, he does not remember the name of the antibiotics but it has helped prevent  UTI 's   flow sheets reviewed and he is currently afebrile       Suggest:  1.	 Day # 4 of 7 IV acyclovir for zoster   2.	 ID issues reviewed with wife at bedside  3.	reviewed case with NP on floor regarding prescribing magic mouth wash for oral irritation

## 2019-11-08 NOTE — PROGRESS NOTE ADULT - SUBJECTIVE AND OBJECTIVE BOX
CARDIOLOGY     PROGRESS  NOTE   ________________________________________________    CHIEF COMPLAINT:Patient is a 84y old  Male who presents with a chief complaint of zoster (08 Nov 2019 08:58)  no complain.  	  REVIEW OF SYSTEMS:  CONSTITUTIONAL: No fever, weight loss, or fatigue  EYES: No eye pain, visual disturbances, or discharge  ENT:  No difficulty hearing, tinnitus, vertigo; No sinus or throat pain  NECK: No pain or stiffness  RESPIRATORY: No cough, wheezing, chills or hemoptysis; No Shortness of Breath  CARDIOVASCULAR: No chest pain, palpitations, passing out, dizziness, or leg swelling  GASTROINTESTINAL: No abdominal or epigastric pain. No nausea, vomiting, or hematemesis; No diarrhea or constipation. No melena or hematochezia.  GENITOURINARY: No dysuria, frequency, hematuria, or incontinence  NEUROLOGICAL: No headaches, memory loss, loss of strength, numbness, or tremors  SKIN: No itching, burning, rashes, or lesions   LYMPH Nodes: No enlarged glands  ENDOCRINE: No heat or cold intolerance; No hair loss  MUSCULOSKELETAL: No joint pain or swelling; No muscle, back, or extremity pain  PSYCHIATRIC: No depression, anxiety, mood swings, or difficulty sleeping  HEME/LYMPH: No easy bruising, or bleeding gums  ALLERGY AND IMMUNOLOGIC: No hives or eczema	    [ ] All others negative	  [ ] Unable to obtain    PHYSICAL EXAM:  T(C): 36.7 (11-08-19 @ 05:23), Max: 36.7 (11-07-19 @ 11:41)  HR: 87 (11-08-19 @ 05:23) (80 - 90)  BP: 159/72 (11-08-19 @ 05:23) (129/61 - 159/72)  RR: 18 (11-08-19 @ 05:23) (16 - 18)  SpO2: 97% (11-08-19 @ 05:23) (96% - 97%)  Wt(kg): --  I&O's Summary    07 Nov 2019 07:01  -  08 Nov 2019 07:00  --------------------------------------------------------  IN: 1270 mL / OUT: 1350 mL / NET: -80 mL    08 Nov 2019 07:01  -  08 Nov 2019 09:03  --------------------------------------------------------  IN: 0 mL / OUT: 250 mL / NET: -250 mL        Appearance: Normal, decrease redness in the face	  HEENT:   Normal oral mucosa, PERRL, EOMI	  Lymphatic: No lymphadenopathy  Cardiovascular: Normal S1 S2, No JVD, No murmurs, No edema  Respiratory: Lungs clear to auscultation	  Psychiatry: A & O x 3, Mood & affect appropriate  Gastrointestinal:  Soft, Non-tender, + BS	  Skin: No rashes, No ecchymoses, No cyanosis	  Neurologic: Non-focal  Extremities: Normal range of motion, No clubbing, cyanosis or edema  Vascular: Peripheral pulses palpable 2+ bilaterally    MEDICATIONS  (STANDING):  acyclovir IVPB 750 milliGRAM(s) IV Intermittent every 8 hours  apixaban 5 milliGRAM(s) Oral two times a day  artificial tears (preservative free) Ophthalmic Solution 1 Drop(s) Left EYE every 2 hours  cyclopentolate 1% Solution 1 Drop(s) Left EYE three times a day  dextrose 5%. 1000 milliLiter(s) (50 mL/Hr) IV Continuous <Continuous>  dextrose 50% Injectable 12.5 Gram(s) IV Push once  dextrose 50% Injectable 25 Gram(s) IV Push once  dextrose 50% Injectable 25 Gram(s) IV Push once  erythromycin   Ointment 1 Application(s) Left EYE three times a day  finasteride 5 milliGRAM(s) Oral daily  insulin lispro (HumaLOG) corrective regimen sliding scale   SubCutaneous three times a day before meals  insulin lispro (HumaLOG) corrective regimen sliding scale   SubCutaneous at bedtime  lisinopril 2.5 milliGRAM(s) Oral daily  metoprolol tartrate 100 milliGRAM(s) Oral daily  metoprolol tartrate 50 milliGRAM(s) Oral daily  pantoprazole    Tablet 40 milliGRAM(s) Oral before breakfast  prednisoLONE acetate 1% Suspension 2 Drop(s) Left EYE four times a day      TELEMETRY: 	    ECG:  	  RADIOLOGY:  OTHER: 	  	  LABS:	 	    CARDIAC MARKERS:                                9.0    2.41  )-----------( 118      ( 08 Nov 2019 08:07 )             25.2     11-08    130<L>  |  92<L>  |  10  ----------------------------<  140<H>  4.0   |  25  |  0.85    Ca    8.3<L>      08 Nov 2019 05:57      proBNP:   Lipid Profile:   HgA1c: Hemoglobin A1C, Whole Blood: 7.3 % (11-06 @ 09:05)    TSH: Thyroid Stimulating Hormone, Serum: 1.25 uIU/mL (11-06 @ 08:51)          Assessment and plan  ---------------------------  Herpes Zoster  continue meds as per ID  cardiomyopathy/a.fib/chronic stable    continue ac  beta blocker  increase lisinopril

## 2019-11-08 NOTE — DISCHARGE NOTE PROVIDER - CARE PROVIDER_API CALL
Marvel Mccartney)  Internal Medicine  [address], [place] [address]  Phone: [phone]  Fax: [phone]  Follow Up Time:     Veronica Nguyen; ALIX)  Ophthalmology  [address], [address]  [place] [address]  Phone: [phone]  Fax: [phone]  Follow Up Time:

## 2019-11-08 NOTE — DISCHARGE NOTE PROVIDER - NSDCCPCAREPLAN_GEN_ALL_CORE_FT
PRINCIPAL DISCHARGE DIAGNOSIS  Diagnosis: Disseminated zoster  Assessment and Plan of Treatment:       SECONDARY DISCHARGE DIAGNOSES  Diagnosis: Chronic anemia  Assessment and Plan of Treatment: Chronic anemia PRINCIPAL DISCHARGE DIAGNOSIS  Diagnosis: Disseminated zoster  Assessment and Plan of Treatment: You completed a course of anti-viral medication.      SECONDARY DISCHARGE DIAGNOSES  Diagnosis: Chronic anemia  Assessment and Plan of Treatment: Follow up with your primary care physician within 2 weeks of discharge. Your blood count has remained stable with no signs or symptoms of active bleeding. If you notice blood in your urine or stool, vomiting blood, blood in your mucus, or experience a nose bleed that will not stop contact your primary care physician right away or return to the emergency room.

## 2019-11-08 NOTE — PROGRESS NOTE ADULT - SUBJECTIVE AND OBJECTIVE BOX
CC: f/u for disseminated zoster    Patient reports that he is feeling better   He reports that he feels like he mouth is irritated when he eats food    REVIEW OF SYSTEMS:  All other review of systems negative (Comprehensive ROS)      Other Medications Reviewed    Vital Signs Last 24 Hrs  T(C): 36.7 (2019 11:41), Max: 36.9 (2019 16:33)  T(F): 98.1 (2019 11:41), Max: 98.4 (2019 16:33)  HR: 82 (2019 11:41) (82 - 87)  BP: 129/61 (2019 11:41) (129/61 - 145/73)  BP(mean): --  RR: 16 (2019 11:41) (16 - 18)  SpO2: 97% (2019 11:41) (95% - 97%)    PHYSICAL EXAM:  General: alert, no acute distress  Eyes:  anicteric, left sided conjunctival injection, no discharge  Oropharynx: noted on his lip he has dried up lesion 	  Neck: supple, without adenopathy  Lungs: clear to auscultation  Heart: irregular rate and rhythm; no murmur, rubs or gallops  Abdomen: soft, nondistended, nontender, without mass or organomegaly  Skin: scattered Zoster lesions on trunk and extremities, healing facial lesions  Extremities: no clubbing, cyanosis, or edema  Neurologic: alert, oriented, moves all extremities    LAB RESULTS:                        9.0    2.41  )-----------( 118      ( 2019 08:07 )             25.2                           9.3    2.57  )-----------( 107      ( 2019 09:29 )             26.0                           10.2   3.75  )-----------( 113      ( 2019 09:05 )             29.0     11-06    130<L>  |  94<L>  |  13  ----------------------------<  136<H>  3.9   |  23  |  0.98    Ca    8.6      2019 05:46    TPro  6.4  /  Alb  4.2  /  TBili  0.8  /  DBili  x   /  AST  21  /  ALT  29  /  AlkPhos  60  11-05    LIVER FUNCTIONS - ( 2019 01:07 )  Alb: 4.2 g/dL / Pro: 6.4 g/dL / ALK PHOS: 60 U/L / ALT: 29 U/L / AST: 21 U/L / GGT: x           Urinalysis Basic - ( 2019 01:42 )    Color: Yellow / Appearance: Clear / S.020 / pH: x  Gluc: x / Ketone: Trace  / Bili: Negative / Urobili: 2 mg/dL   Blood: x / Protein: Trace / Nitrite: Negative   Leuk Esterase: Negative / RBC: 2 /hpf / WBC 2 /HPF   Sq Epi: x / Non Sq Epi: 1 /hpf / Bacteria: Few      MICROBIOLOGY:  RECENT CULTURES:   @ 05:40 .Blood Blood-Peripheral     No growth to date.       @ 05:27 .Urine Clean Catch (Midstream)     10,000 - 49,000 CFU/mL Gram Negative Rods       @ 03:06 .Blood Blood-Peripheral     No growth to date.          RADIOLOGY REVIEWED:    < from: Xray Chest 1 View- PORTABLE-Urgent (19 @ 01:01) >    IMPRESSION:   Clear lungs.     < end of copied text >

## 2019-11-08 NOTE — DISCHARGE NOTE PROVIDER - INSTRUCTIONS
diabetic  Fluid Restriction - 1 liter daily Follow a consistent carbohydrate diet with plenty of fresh non-starchy vegetables (Ex: lettuce, broccoli, carrots, cucumbers, celery, kale) and lean protein. Avoid sugar sweetened beverages, whole milk and other dairy products such as yogurt, baked goods/sweets, and candy. Starchy foods such as white bread, white rice, cereal, potatoes, corn, and pasta should also be avoided. The preferred dietary options include whole grain bread, brown rice, whole grain pasta, and foods rich in fiber such as leafy green vegetables, apples, berries, and certain beans.   Many fruits also may cause your blood sugar to increase quickly. It is recommended to limit your fruit servings to 2-3 per day. Limit or avoid fruits high in sugar including grapes, cherries, tropical fruits (bala, pineapple, papaya), melon, banana, fruit juice, and dried fruit. Fruit such as apples, pears, berries, kiwi, grapefruit, and orange are preferred.

## 2019-11-08 NOTE — PROGRESS NOTE ADULT - ASSESSMENT
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  Had a 102.9 fever, was given fluids, a dose of zosyn and started on IV ACV. optho has seen, no deep ocular involvement. He is alert, denies any headache, is able to follow commands. He has about 20 scattered lesions over body, trunk amd all 4 extremities. His left facial lesions have crusted.  Hem consult/follow because of cytopenias, wife had stated he gets procrit with his primary hem/onc and also Rituxan wvery 2 months but was in remission from lymphoma   Zoster is improving.   His anemia has component of illness, hydration etc. However since iron and B12 are OK, I  gave him procrit for anemia of Malignancy, and chemo. He may not respond well because of his illness and it takes time anyway  His mild neutropenia and thrombocytopenia is likely related to viral illness and can be observed. Platelets are already improving  Last Rituxan was one month back and he gets it every 2 months and would not have been given with active infection anyway  Follow ID instructions

## 2019-11-08 NOTE — PROGRESS NOTE ADULT - ASSESSMENT
84  yr  w/        HTN,   HLD,    DM (glipizide),   CKD. , CAD  on ct, . AF  on eliquis,  diastolic  chf       marginal  zone lymphoma ,    s/p chemo by dr brown,  july/ 2019/   none  recently,  , not on chemo at present   .  h/o  anemia/ low platelets              admitted  with  left face/  eyelids,  h  zoster/  with crusted lesions,  ,     face / V1  distrubution, trigeminal  nerve /  noted  by pt  and  family. around  10/ 22/ 19,  while  in [facility]/    saw   a  dr  on 10/ 24/ 19, in [place]  no lesions,  left  ear  eyes.  no  dendritic  corneal  lesions  per  ophthalmology/ superficial punctate  keratitis  noted  on iv Acyclovir   DM,  follow fs   AF , on   asa/ Eliquis/  lopressor  bid/  lisinopril/ echo  ef  56     ID  eval noted   seen by  opthalmology /  local rx   as  per  ophthalmology   hyponatremia. , stable     blood  c/s,  negative /  rvp, negative  anemia,  seen by gi  afebrile  now/  pancytopenia now/ heme eval  suspect pancytopenia  is  from  viral process/ some of  it is  chronic related  to prior  malignancy  pt has  zoster  for  about   15 days  or  more, pt  not expected to be  contagious  now   maculo papular lesions on extremities, not  typical  appearing  for  h  zoster    spoke  to  wife  seen by heme/  on procrit         < from: CT Chest No Cont (06.02.19 @ 20:18) >  IMPRESSION:Small bilateral pleural effusions, left greater than right.  Interlobular septal thickening representing pulmonary edema.  Small pericarial effusion.  Coronary artery atherosclerotic disease.  < end of copied text > 84  yr  w/        HTN,   HLD,    DM (glipizide),   CKD. , CAD  on ct, . AF  on eliquis,  diastolic  chf       marginal  zone lymphoma ,    s/p chemo by dr brown,  july/ 2019/   none  recently,  , not on chemo at present   .  h/o  anemia/ low platelets              admitted  with  left face/  eyelids,  h  zoster/  with crusted lesions,  ,     face / V1  distrubution, trigeminal  nerve /  noted  by pt  and  family. around  10/ 22/ 19,  while  in [facility]/    saw   a  dr  on 10/ 24/ 19, in [place]  no lesions,  left  ear  eyes.  no  dendritic  corneal  lesions  per  ophthalmology/ superficial punctate  keratitis  noted  on iv Acyclovir   DM,  follow fs   AF , on   asa/ Eliquis/  lopressor  bid/  lisinopril/ echo  ef  56     ID  eval noted   seen by  opthalmology /  local rx   as  per  ophthalmology   hyponatremia. , stable     blood  c/s,  negative /  rvp, negative  anemia,  seen by gi  afebrile  now/  pancytopenia now/ heme eval  suspect pancytopenia  is  from  viral process/ some of  it is  chronic related  to prior  malignancy  pt has  zoster  for  about   15 days  or  more, pt  not expected to be  contagious  now   maculo papular lesions on extremities, not  typical  appearing  for  h  zoster    spoke  to  wife/  wants  pt  home on monday  seen by heme/  on procrit         < from: CT Chest No Cont (06.02.19 @ 20:18) >  IMPRESSION:Small bilateral pleural effusions, left greater than right.  Interlobular septal thickening representing pulmonary edema.  Small pericarial effusion.  Coronary artery atherosclerotic disease.  < end of copied text >

## 2019-11-08 NOTE — DISCHARGE NOTE PROVIDER - NSDCFUADDINST_GEN_ALL_CORE_FT
discharge to subacute rehab - follow with the providers at rehab  Make appointments to follow up with your physicians when you are discharged from rehab - bring all discharge paperwork including discharge medication list to follow up appointments

## 2019-11-09 LAB
ANION GAP SERPL CALC-SCNC: 12 MMOL/L — SIGNIFICANT CHANGE UP (ref 5–17)
BUN SERPL-MCNC: 10 MG/DL — SIGNIFICANT CHANGE UP (ref 7–23)
CALCIUM SERPL-MCNC: 8.3 MG/DL — LOW (ref 8.4–10.5)
CHLORIDE SERPL-SCNC: 90 MMOL/L — LOW (ref 96–108)
CO2 SERPL-SCNC: 25 MMOL/L — SIGNIFICANT CHANGE UP (ref 22–31)
CREAT SERPL-MCNC: 0.77 MG/DL — SIGNIFICANT CHANGE UP (ref 0.5–1.3)
GLUCOSE BLDC GLUCOMTR-MCNC: 159 MG/DL — HIGH (ref 70–99)
GLUCOSE BLDC GLUCOMTR-MCNC: 173 MG/DL — HIGH (ref 70–99)
GLUCOSE BLDC GLUCOMTR-MCNC: 178 MG/DL — HIGH (ref 70–99)
GLUCOSE BLDC GLUCOMTR-MCNC: 207 MG/DL — HIGH (ref 70–99)
GLUCOSE SERPL-MCNC: 173 MG/DL — HIGH (ref 70–99)
HCT VFR BLD CALC: 24.8 % — LOW (ref 39–50)
HGB BLD-MCNC: 9.1 G/DL — LOW (ref 13–17)
MCHC RBC-ENTMCNC: 32.7 PG — SIGNIFICANT CHANGE UP (ref 27–34)
MCHC RBC-ENTMCNC: 36.7 GM/DL — HIGH (ref 32–36)
MCV RBC AUTO: 89.2 FL — SIGNIFICANT CHANGE UP (ref 80–100)
PLATELET # BLD AUTO: 133 K/UL — LOW (ref 150–400)
POTASSIUM SERPL-MCNC: 3.8 MMOL/L — SIGNIFICANT CHANGE UP (ref 3.5–5.3)
POTASSIUM SERPL-SCNC: 3.8 MMOL/L — SIGNIFICANT CHANGE UP (ref 3.5–5.3)
RBC # BLD: 2.78 M/UL — LOW (ref 4.2–5.8)
RBC # FLD: 12.9 % — SIGNIFICANT CHANGE UP (ref 10.3–14.5)
SODIUM SERPL-SCNC: 127 MMOL/L — LOW (ref 135–145)
WBC # BLD: 2.32 K/UL — LOW (ref 3.8–10.5)
WBC # FLD AUTO: 2.32 K/UL — LOW (ref 3.8–10.5)

## 2019-11-09 RX ADMIN — Medication 1: at 08:26

## 2019-11-09 RX ADMIN — APIXABAN 5 MILLIGRAM(S): 2.5 TABLET, FILM COATED ORAL at 05:10

## 2019-11-09 RX ADMIN — Medication 1 DROP(S): at 12:22

## 2019-11-09 RX ADMIN — Medication 50 MILLIGRAM(S): at 17:19

## 2019-11-09 RX ADMIN — Medication 1 APPLICATION(S): at 05:10

## 2019-11-09 RX ADMIN — Medication 1 DROP(S): at 17:20

## 2019-11-09 RX ADMIN — Medication 2 DROP(S): at 17:19

## 2019-11-09 RX ADMIN — Medication 100 MILLIGRAM(S): at 05:12

## 2019-11-09 RX ADMIN — Medication 1 DROP(S): at 05:09

## 2019-11-09 RX ADMIN — CYCLOPENTOLATE HYDROCHLORIDE 1 DROP(S): 10 SOLUTION/ DROPS OPHTHALMIC at 05:10

## 2019-11-09 RX ADMIN — Medication 1 APPLICATION(S): at 13:30

## 2019-11-09 RX ADMIN — PANTOPRAZOLE SODIUM 40 MILLIGRAM(S): 20 TABLET, DELAYED RELEASE ORAL at 05:12

## 2019-11-09 RX ADMIN — APIXABAN 5 MILLIGRAM(S): 2.5 TABLET, FILM COATED ORAL at 17:19

## 2019-11-09 RX ADMIN — Medication 1 DROP(S): at 08:26

## 2019-11-09 RX ADMIN — Medication 165 MILLIGRAM(S): at 05:10

## 2019-11-09 RX ADMIN — Medication 165 MILLIGRAM(S): at 21:56

## 2019-11-09 RX ADMIN — DIPHENHYDRAMINE HYDROCHLORIDE AND LIDOCAINE HYDROCHLORIDE AND ALUMINUM HYDROXIDE AND MAGNESIUM HYDRO 10 MILLILITER(S): KIT at 17:20

## 2019-11-09 RX ADMIN — Medication 1: at 17:19

## 2019-11-09 RX ADMIN — Medication 1 DROP(S): at 21:57

## 2019-11-09 RX ADMIN — Medication 1 DROP(S): at 13:30

## 2019-11-09 RX ADMIN — Medication 2 DROP(S): at 05:12

## 2019-11-09 RX ADMIN — Medication 2 DROP(S): at 12:35

## 2019-11-09 RX ADMIN — Medication 165 MILLIGRAM(S): at 13:30

## 2019-11-09 RX ADMIN — Medication 1 DROP(S): at 15:00

## 2019-11-09 RX ADMIN — FINASTERIDE 5 MILLIGRAM(S): 5 TABLET, FILM COATED ORAL at 12:35

## 2019-11-09 RX ADMIN — CYCLOPENTOLATE HYDROCHLORIDE 1 DROP(S): 10 SOLUTION/ DROPS OPHTHALMIC at 13:30

## 2019-11-09 RX ADMIN — LISINOPRIL 5 MILLIGRAM(S): 2.5 TABLET ORAL at 05:12

## 2019-11-09 RX ADMIN — DIPHENHYDRAMINE HYDROCHLORIDE AND LIDOCAINE HYDROCHLORIDE AND ALUMINUM HYDROXIDE AND MAGNESIUM HYDRO 10 MILLILITER(S): KIT at 05:12

## 2019-11-09 RX ADMIN — Medication 1: at 12:21

## 2019-11-09 RX ADMIN — Medication 1 DROP(S): at 19:56

## 2019-11-09 NOTE — PROGRESS NOTE ADULT - ASSESSMENT
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  Given his age, medical co-morbidities,lymphoma and rituxan therapy he was started on  IV treatment   He has facial involvement and cutaneous dissemination.  The patient clinically has improved the swelling in his left side of his face has improved  blood culture so far are no growth   reviewed his urine cx reported growing morganella but the patient does not have any  symptoms no dysuria so likely asymptomatic bacteruria   The family at the bedside reports that he has be on chronic daily suppression antibiotics because he has history of UTI's, he does not remember the name of the antibiotics but it has helped prevent  UTI 's   he remains afebrile      Suggest:  1.	 Day # 5 of 7 IV acyclovir for zoster   2.	 reviewed and discussed with the wife that he continues on antivirals

## 2019-11-09 NOTE — PROGRESS NOTE ADULT - SUBJECTIVE AND OBJECTIVE BOX
CARDIOLOGY     PROGRESS  NOTE   ________________________________________________    CHIEF COMPLAINT:Patient is a 84y old  Male who presents with a chief complaint of zoster (08 Nov 2019 17:06)  no complain.  	  REVIEW OF SYSTEMS:  CONSTITUTIONAL: No fever, weight loss, or fatigue  EYES: No eye pain, visual disturbances, or discharge  ENT:  No difficulty hearing, tinnitus, vertigo; No sinus or throat pain  NECK: No pain or stiffness  RESPIRATORY: No cough, wheezing, chills or hemoptysis; No Shortness of Breath  CARDIOVASCULAR: No chest pain, palpitations, passing out, dizziness, or leg swelling  GASTROINTESTINAL: No abdominal or epigastric pain. No nausea, vomiting, or hematemesis; No diarrhea or constipation. No melena or hematochezia.  GENITOURINARY: No dysuria, frequency, hematuria, or incontinence  NEUROLOGICAL: No headaches, memory loss, loss of strength, numbness, or tremors  SKIN: No itching, burning, rashes, or lesions   LYMPH Nodes: No enlarged glands  ENDOCRINE: No heat or cold intolerance; No hair loss  MUSCULOSKELETAL: No joint pain or swelling; No muscle, back, or extremity pain  PSYCHIATRIC: No depression, anxiety, mood swings, or difficulty sleeping  HEME/LYMPH: No easy bruising, or bleeding gums  ALLERGY AND IMMUNOLOGIC: No hives or eczema	    [ ] All others negative	  [ ] Unable to obtain    PHYSICAL EXAM:  T(C): 36.6 (11-09-19 @ 04:48), Max: 36.9 (11-08-19 @ 21:18)  HR: 93 (11-09-19 @ 04:48) (64 - 93)  BP: 158/92 (11-09-19 @ 04:48) (143/70 - 158/92)  RR: 18 (11-09-19 @ 04:48) (18 - 18)  SpO2: 97% (11-09-19 @ 04:48) (96% - 97%)  Wt(kg): --  I&O's Summary    08 Nov 2019 07:01  -  09 Nov 2019 07:00  --------------------------------------------------------  IN: 300 mL / OUT: 950 mL / NET: -650 mL        Appearance: Renetta/, rashes are improving  HEENT:   Normal oral mucosa, PERRL, EOMI	  Lymphatic: No lymphadenopathy  Cardiovascular: Normal S1 S2, No JVD, +murmurs, No edema  Respiratory: Lungs clear to auscultation	  Psychiatry: A & O x 3, Mood & affect appropriate  Gastrointestinal:  Soft, Non-tender, + BS	  Skin: No rashes, No ecchymoses, No cyanosis	  Neurologic: Non-focal  Extremities: Normal range of motion, No clubbing, cyanosis or edema  Vascular: Peripheral pulses palpable 2+ bilaterally    MEDICATIONS  (STANDING):  acyclovir IVPB 750 milliGRAM(s) IV Intermittent every 8 hours  apixaban 5 milliGRAM(s) Oral two times a day  artificial tears (preservative free) Ophthalmic Solution 1 Drop(s) Left EYE every 2 hours  cyclopentolate 1% Solution 1 Drop(s) Left EYE three times a day  dextrose 5%. 1000 milliLiter(s) (50 mL/Hr) IV Continuous <Continuous>  dextrose 50% Injectable 12.5 Gram(s) IV Push once  dextrose 50% Injectable 25 Gram(s) IV Push once  dextrose 50% Injectable 25 Gram(s) IV Push once  erythromycin   Ointment 1 Application(s) Left EYE three times a day  finasteride 5 milliGRAM(s) Oral daily  FIRST- Mouthwash  BLM 10 milliLiter(s) Swish and Spit two times a day  insulin lispro (HumaLOG) corrective regimen sliding scale   SubCutaneous three times a day before meals  insulin lispro (HumaLOG) corrective regimen sliding scale   SubCutaneous at bedtime  lisinopril 5 milliGRAM(s) Oral daily  metoprolol tartrate 100 milliGRAM(s) Oral daily  metoprolol tartrate 50 milliGRAM(s) Oral daily  pantoprazole    Tablet 40 milliGRAM(s) Oral before breakfast  prednisoLONE acetate 1% Suspension 2 Drop(s) Left EYE four times a day      TELEMETRY: 	    ECG:  	  RADIOLOGY:  OTHER: 	  	  LABS:	 	    CARDIAC MARKERS:                                9.0    2.41  )-----------( 118      ( 08 Nov 2019 08:07 )             25.2     11-09    127<L>  |  90<L>  |  10  ----------------------------<  173<H>  3.8   |  25  |  0.77    Ca    8.3<L>      09 Nov 2019 05:51      proBNP:   Lipid Profile:   HgA1c: Hemoglobin A1C, Whole Blood: 7.3 % (11-06 @ 09:05)    TSH: Thyroid Stimulating Hormone, Serum: 1.25 uIU/mL (11-06 @ 08:51)          Assessment and plan  ---------------------------  a.fib  hr is not well controlled  increase lopressor to 100 bid  continue lisinopril  ac  abx course as per id  optho follow up

## 2019-11-09 NOTE — PROGRESS NOTE ADULT - SUBJECTIVE AND OBJECTIVE BOX
no cp/sob    REVIEW OF SYSTEMS:  GEN: no fever,    no chills  RESP: no SOB,   no cough  CVS: no chest pain,   no palpitations  GI: no abdominal pain,   no nausea,   no vomiting,   no constipation,   no diarrhea  : no dysuria,   no frequency  NEURO: no headache,   no dizziness  PSYCH: no depression,   not anxi    MEDICATIONS  (STANDING):  acyclovir IVPB 750 milliGRAM(s) IV Intermittent every 8 hours  apixaban 5 milliGRAM(s) Oral two times a day  artificial tears (preservative free) Ophthalmic Solution 1 Drop(s) Left EYE every 2 hours  cyclopentolate 1% Solution 1 Drop(s) Left EYE three times a day  dextrose 5%. 1000 milliLiter(s) (50 mL/Hr) IV Continuous <Continuous>  dextrose 50% Injectable 12.5 Gram(s) IV Push once  dextrose 50% Injectable 25 Gram(s) IV Push once  dextrose 50% Injectable 25 Gram(s) IV Push once  erythromycin   Ointment 1 Application(s) Left EYE three times a day  finasteride 5 milliGRAM(s) Oral daily  FIRST- Mouthwash  BLM 10 milliLiter(s) Swish and Spit two times a day  insulin lispro (HumaLOG) corrective regimen sliding scale   SubCutaneous three times a day before meals  insulin lispro (HumaLOG) corrective regimen sliding scale   SubCutaneous at bedtime  lisinopril 5 milliGRAM(s) Oral daily  metoprolol tartrate 100 milliGRAM(s) Oral daily  metoprolol tartrate 50 milliGRAM(s) Oral daily  pantoprazole    Tablet 40 milliGRAM(s) Oral before breakfast  prednisoLONE acetate 1% Suspension 2 Drop(s) Left EYE four times a day    MEDICATIONS  (PRN):  acetaminophen   Tablet .. 650 milliGRAM(s) Oral every 6 hours PRN Temp greater or equal to 38C (100.4F), Mild Pain (1 - 3)  dextrose 40% Gel 15 Gram(s) Oral once PRN Blood Glucose LESS THAN 70 milliGRAM(s)/deciliter  glucagon  Injectable 1 milliGRAM(s) IntraMuscular once PRN Glucose LESS THAN 70 milligrams/deciliter  guaiFENesin   Syrup  (Sugar-Free) 100 milliGRAM(s) Oral every 6 hours PRN Cough      Vital Signs Last 24 Hrs  T(C): 36.6 (09 Nov 2019 04:48), Max: 36.9 (08 Nov 2019 21:18)  T(F): 97.9 (09 Nov 2019 04:48), Max: 98.4 (08 Nov 2019 21:18)  HR: 93 (09 Nov 2019 04:48) (64 - 93)  BP: 158/92 (09 Nov 2019 04:48) (143/70 - 158/92)  BP(mean): --  RR: 18 (09 Nov 2019 04:48) (18 - 18)  SpO2: 97% (09 Nov 2019 04:48) (96% - 97%)  CAPILLARY BLOOD GLUCOSE      POCT Blood Glucose.: 159 mg/dL (09 Nov 2019 08:13)  POCT Blood Glucose.: 194 mg/dL (08 Nov 2019 21:56)  POCT Blood Glucose.: 171 mg/dL (08 Nov 2019 16:43)  POCT Blood Glucose.: 233 mg/dL (08 Nov 2019 12:25)    I&O's Summary    08 Nov 2019 07:01  -  09 Nov 2019 07:00  --------------------------------------------------------  IN: 300 mL / OUT: 950 mL / NET: -650 mL    09 Nov 2019 07:01  -  09 Nov 2019 11:40  --------------------------------------------------------  IN: 120 mL / OUT: 0 mL / NET: 120 mL        PHYSICAL EXAM:  HEAD:  Atraumatic, Normocephalic  NECK: Supple, No   JVD  CHEST/LUNG:   no     rales,     no,    rhonchi  HEART: Regular rate and rhythm;         murmur  ABDOMEN: Soft, Nontender, ;   EXTREMITIES:  no      edema  NEUROLOGY:  alert   lesions. legs  resolving    LABS:                        9.1    2.32  )-----------( 133      ( 09 Nov 2019 10:43 )             24.8     11-09    127<L>  |  90<L>  |  10  ----------------------------<  173<H>  3.8   |  25  |  0.77    Ca    8.3<L>      09 Nov 2019 05:51                    Hemoglobin A1C, Whole Blood: 7.3 % (11-06 @ 09:05)    Thyroid Stimulating Hormone, Serum: 1.25 uIU/mL (11-06 @ 08:51)          Consultant(s) Notes Reviewed:      Care Discussed with Consultants/Other Providers:

## 2019-11-09 NOTE — PROGRESS NOTE ADULT - SUBJECTIVE AND OBJECTIVE BOX
GASTROENTEROLOGY    Patient seen and examined at bedside. Spouse present  Wife reports that his oral intake is poor  Denies nausea/vomiting/abdominal pain  + brown stool at the time of my visit this morning      MEDICATIONS  (STANDING):  acyclovir IVPB 750 milliGRAM(s) IV Intermittent every 8 hours  apixaban 5 milliGRAM(s) Oral two times a day  artificial tears (preservative free) Ophthalmic Solution 1 Drop(s) Left EYE every 2 hours  cyclopentolate 1% Solution 1 Drop(s) Left EYE three times a day  dextrose 5%. 1000 milliLiter(s) (50 mL/Hr) IV Continuous <Continuous>  dextrose 50% Injectable 12.5 Gram(s) IV Push once  dextrose 50% Injectable 25 Gram(s) IV Push once  dextrose 50% Injectable 25 Gram(s) IV Push once  erythromycin   Ointment 1 Application(s) Left EYE three times a day  finasteride 5 milliGRAM(s) Oral daily  FIRST- Mouthwash  BLM 10 milliLiter(s) Swish and Spit two times a day  insulin lispro (HumaLOG) corrective regimen sliding scale   SubCutaneous three times a day before meals  insulin lispro (HumaLOG) corrective regimen sliding scale   SubCutaneous at bedtime  lisinopril 5 milliGRAM(s) Oral daily  metoprolol tartrate 100 milliGRAM(s) Oral daily  metoprolol tartrate 50 milliGRAM(s) Oral daily  pantoprazole    Tablet 40 milliGRAM(s) Oral before breakfast  prednisoLONE acetate 1% Suspension 2 Drop(s) Left EYE four times a day  Ure-Na 15 Gram(s) 15 Gram(s) Oral two times a day        T(F): 97.8 (11-09-19 @ 11:59), Max: 98.4 (11-08-19 @ 21:18)  HR: 87 (11-09-19 @ 11:59) (64 - 93)  BP: 153/72 (11-09-19 @ 11:59) (143/70 - 158/92)  RR: 18 (11-09-19 @ 11:59) (18 - 18)  SpO2: 95% (11-09-19 @ 11:59) (95% - 97%)  Wt(kg): --    PHYSICAL EXAM  GENERAL:   NAD  HEENT:  NC/AT, no JVD, sclera-anicteric  CHEST:  clear to ascultation bilaterally, respirations nonlabored  HEART:  +S1+S2 regular rate and rhythm   ABDOMEN:  Soft, non-tender, non-distended, normoactive bowel sounds, no masses  EXTREMITIES:  no cyanosis, clubbing or edema  NEURO:  Alert, oriented  SKIN:  No rash/warm/dry      LABS:                        9.1<L>  2.32<L> )-----------( 133<L>    ( 09 Nov 2019 10:43 )             24.8<L>  09 Nov 2019 10:43    11-09    127<L>  |  90<L>  |  10  ----------------------------<  173<H>  3.8   |  25  |  0.77    Ca    8.3<L>      09 Nov 2019 05:51          I&O's Detail    08 Nov 2019 07:01  -  09 Nov 2019 07:00  --------------------------------------------------------  IN:    Oral Fluid: 300 mL  Total IN: 300 mL    OUT:    Voided: 950 mL  Total OUT: 950 mL    Total NET: -650 mL      09 Nov 2019 07:01  -  09 Nov 2019 15:01  --------------------------------------------------------  IN:    Oral Fluid: 240 mL  Total IN: 240 mL    OUT:    Voided: 500 mL  Total OUT: 500 mL    Total NET: -260 mL

## 2019-11-09 NOTE — PROGRESS NOTE ADULT - SUBJECTIVE AND OBJECTIVE BOX
CC: f/u for disseminated zoster improved lesions have crusted over and drying     Patient is sitting up in chair, no fever overnight     REVIEW OF SYSTEMS:  All other review of systems negative (Comprehensive ROS)      Other Medications Reviewed    Vital Signs Last 24 Hrs  T(C): 36.7 (2019 11:41), Max: 36.9 (2019 16:33)  T(F): 98.1 (2019 11:41), Max: 98.4 (2019 16:33)  HR: 82 (2019 11:41) (82 - 87)  BP: 129/61 (2019 11:41) (129/61 - 145/73)  BP(mean): --  RR: 16 (2019 11:41) (16 - 18)  SpO2: 97% (2019 11:41) (95% - 97%)    PHYSICAL EXAM:  General: alert, no acute distress  Eyes:  anicteric, left sided conjunctival injection, no discharge  left side of his forehead he has dried up crusted lesions healing   Oropharynx: noted on his lip he has dried up lesion 	  Neck: supple, without adenopathy  Lungs: clear to auscultation  Heart: irregular rate and rhythm; no murmur, rubs or gallops  Abdomen: soft, nondistended, nontender, without mass or organomegaly  Skin: scattered Zoster lesions on trunk and extremities, healing facial lesions  Extremities: no clubbing, cyanosis, or edema  Neurologic: alert, oriented, moves all extremities    LAB RESULTS:                        9.1    2.32  )-----------( 133      ( 2019 10:43 )             24.8                           9.0    2.41  )-----------( 118      ( 2019 08:07 )             25.2       11-09    127<L>  |  90<L>  |  10  ----------------------------<  173<H>  3.8   |  25  |  0.77    Ca    8.3<L>      2019 05:51        Urinalysis Basic - ( 2019 01:42 )    Color: Yellow / Appearance: Clear / S.020 / pH: x  Gluc: x / Ketone: Trace  / Bili: Negative / Urobili: 2 mg/dL   Blood: x / Protein: Trace / Nitrite: Negative   Leuk Esterase: Negative / RBC: 2 /hpf / WBC 2 /HPF   Sq Epi: x / Non Sq Epi: 1 /hpf / Bacteria: Few      MICROBIOLOGY:  RECENT CULTURES:   @ 05:40 .Blood Blood-Peripheral     No growth to date.       @ 05:27 .Urine Clean Catch (Midstream)     10,000 - 49,000 CFU/mL Gram Negative Rods       @ 03:06 .Blood Blood-Peripheral     No growth to date.          RADIOLOGY REVIEWED:    < from: Xray Chest 1 View- PORTABLE-Urgent (19 @ 01:01) >    IMPRESSION:   Clear lungs.     < end of copied text >

## 2019-11-09 NOTE — PROGRESS NOTE ADULT - ASSESSMENT
84  yr  w/        HTN,   HLD,    DM (glipizide),   CKD. , CAD  on ct, . AF  on eliquis,  diastolic  chf       marginal  zone lymphoma ,    s/p chemo by dr brown,  july/ 2019/   none  recently,  , not on chemo at present   .  h/o  anemia/ low platelets              admitted  with  left face/  eyelids,  h  zoster/  with crusted lesions,  ,     face / V1  distrubution, trigeminal  nerve /  noted  by pt  and  family. around  10/ 22/ 19,  while  in [facility]/    saw   a  dr  on 10/ 24/ 19, in [place]  no lesions,  left  ear  eyes.  no  dendritic  corneal  lesions  per  ophthalmology/ superficial punctate  keratitis  noted  on iv Acyclovir   DM,  follow fs   AF , on   asa/ Eliquis/  lopressor  bid/  lisinopril/ echo  ef  56     ID  eval noted   seen by  opthalmology /  local rx   as  per  ophthalmology   hyponatremia. , stable     blood  c/s,  negative /  rvp, negative  anemia,  seen by gi  afebrile  now/  pancytopenia now/ heme eval  suspect pancytopenia  is  from  viral process/ some of  it is  chronic related  to prior  malignancy  pt has  zoster  for  about   15 days  or  more, pt  not expected to be  contagious  now   maculo papular lesions on extremities, not  typical  appearing  for  h  zoster    spoke  to  wife/   per  PT, needs  rehab  seen by heme/  on procrit  d/c tele         < from: CT Chest No Cont (06.02.19 @ 20:18) >  IMPRESSION:Small bilateral pleural effusions, left greater than right.  Interlobular septal thickening representing pulmonary edema.  Small pericarial effusion.  Coronary artery atherosclerotic disease.  < end of copied text >

## 2019-11-10 DIAGNOSIS — F43.21 ADJUSTMENT DISORDER WITH DEPRESSED MOOD: ICD-10-CM

## 2019-11-10 LAB
ALBUMIN SERPL ELPH-MCNC: 4.4 G/DL
ALP BLD-CCNC: 71 U/L
ALT SERPL-CCNC: 27 U/L
ANION GAP SERPL CALC-SCNC: 15 MMOL/L
ANION GAP SERPL CALC-SCNC: 9 MMOL/L — SIGNIFICANT CHANGE UP (ref 5–17)
AST SERPL-CCNC: 23 U/L
BASOPHILS # BLD AUTO: 0.02 K/UL
BASOPHILS NFR BLD AUTO: 0.6 %
BILIRUB SERPL-MCNC: 0.4 MG/DL
BUN SERPL-MCNC: 16 MG/DL — SIGNIFICANT CHANGE UP (ref 7–23)
BUN SERPL-MCNC: 22 MG/DL
CALCIUM SERPL-MCNC: 8.5 MG/DL — SIGNIFICANT CHANGE UP (ref 8.4–10.5)
CALCIUM SERPL-MCNC: 9 MG/DL
CHLORIDE SERPL-SCNC: 89 MMOL/L — LOW (ref 96–108)
CHLORIDE SERPL-SCNC: 98 MMOL/L
CO2 SERPL-SCNC: 24 MMOL/L
CO2 SERPL-SCNC: 26 MMOL/L — SIGNIFICANT CHANGE UP (ref 22–31)
CREAT SERPL-MCNC: 0.84 MG/DL — SIGNIFICANT CHANGE UP (ref 0.5–1.3)
CREAT SERPL-MCNC: 0.99 MG/DL
CULTURE RESULTS: SIGNIFICANT CHANGE UP
CULTURE RESULTS: SIGNIFICANT CHANGE UP
EOSINOPHIL # BLD AUTO: 0.07 K/UL
EOSINOPHIL NFR BLD AUTO: 2 %
GLUCOSE BLDC GLUCOMTR-MCNC: 173 MG/DL — HIGH (ref 70–99)
GLUCOSE BLDC GLUCOMTR-MCNC: 178 MG/DL — HIGH (ref 70–99)
GLUCOSE BLDC GLUCOMTR-MCNC: 186 MG/DL — HIGH (ref 70–99)
GLUCOSE BLDC GLUCOMTR-MCNC: 210 MG/DL — HIGH (ref 70–99)
GLUCOSE SERPL-MCNC: 189 MG/DL — HIGH (ref 70–99)
GLUCOSE SERPL-MCNC: 211 MG/DL
HCT VFR BLD CALC: 29.3 %
HGB BLD-MCNC: 9.8 G/DL
IMM GRANULOCYTES NFR BLD AUTO: 1.1 %
LYMPHOCYTES # BLD AUTO: 0.83 K/UL
LYMPHOCYTES NFR BLD AUTO: 23.6 %
MAN DIFF?: NORMAL
MCHC RBC-ENTMCNC: 33.4 GM/DL
MCHC RBC-ENTMCNC: 33.4 PG
MCV RBC AUTO: 100 FL
MONOCYTES # BLD AUTO: 0.6 K/UL
MONOCYTES NFR BLD AUTO: 17 %
NEUTROPHILS # BLD AUTO: 1.96 K/UL
NEUTROPHILS NFR BLD AUTO: 55.7 %
PLATELET # BLD AUTO: 125 K/UL
POTASSIUM SERPL-MCNC: 4 MMOL/L — SIGNIFICANT CHANGE UP (ref 3.5–5.3)
POTASSIUM SERPL-SCNC: 3.8 MMOL/L
POTASSIUM SERPL-SCNC: 4 MMOL/L — SIGNIFICANT CHANGE UP (ref 3.5–5.3)
PROT SERPL-MCNC: 6 G/DL
PSA FREE FLD-MCNC: 16 %
PSA FREE SERPL-MCNC: 0.08 NG/ML
PSA SERPL-MCNC: 0.48 NG/ML
RBC # BLD: 2.93 M/UL
RBC # FLD: 13.4 %
SODIUM SERPL-SCNC: 124 MMOL/L — LOW (ref 135–145)
SODIUM SERPL-SCNC: 137 MMOL/L
SODIUM UR-SCNC: 59 MMOL/L — SIGNIFICANT CHANGE UP
SPECIMEN SOURCE: SIGNIFICANT CHANGE UP
SPECIMEN SOURCE: SIGNIFICANT CHANGE UP
TESTOST SERPL-MCNC: 65.7 NG/DL
WBC # FLD AUTO: 3.52 K/UL

## 2019-11-10 PROCEDURE — 90792 PSYCH DIAG EVAL W/MED SRVCS: CPT

## 2019-11-10 RX ORDER — SODIUM CHLORIDE 5 G/100ML
500 INJECTION, SOLUTION INTRAVENOUS
Refills: 0 | Status: DISCONTINUED | OUTPATIENT
Start: 2019-11-10 | End: 2019-11-10

## 2019-11-10 RX ORDER — METOPROLOL TARTRATE 50 MG
75 TABLET ORAL AT BEDTIME
Refills: 0 | Status: DISCONTINUED | OUTPATIENT
Start: 2019-11-10 | End: 2019-11-13

## 2019-11-10 RX ORDER — ACETAMINOPHEN 500 MG
650 TABLET ORAL EVERY 6 HOURS
Refills: 0 | Status: DISCONTINUED | OUTPATIENT
Start: 2019-11-10 | End: 2019-11-11

## 2019-11-10 RX ORDER — ACETAMINOPHEN 500 MG
650 TABLET ORAL ONCE
Refills: 0 | Status: COMPLETED | OUTPATIENT
Start: 2019-11-10 | End: 2019-11-10

## 2019-11-10 RX ORDER — GABAPENTIN 400 MG/1
300 CAPSULE ORAL
Refills: 0 | Status: DISCONTINUED | OUTPATIENT
Start: 2019-11-10 | End: 2019-11-13

## 2019-11-10 RX ORDER — SODIUM CHLORIDE 5 G/100ML
500 INJECTION, SOLUTION INTRAVENOUS
Refills: 0 | Status: DISCONTINUED | OUTPATIENT
Start: 2019-11-10 | End: 2019-11-11

## 2019-11-10 RX ORDER — SODIUM CHLORIDE 9 MG/ML
1000 INJECTION INTRAMUSCULAR; INTRAVENOUS; SUBCUTANEOUS
Refills: 0 | Status: DISCONTINUED | OUTPATIENT
Start: 2019-11-10 | End: 2019-11-10

## 2019-11-10 RX ORDER — OXYCODONE HYDROCHLORIDE 5 MG/1
5 TABLET ORAL EVERY 12 HOURS
Refills: 0 | Status: DISCONTINUED | OUTPATIENT
Start: 2019-11-10 | End: 2019-11-13

## 2019-11-10 RX ORDER — LANOLIN ALCOHOL/MO/W.PET/CERES
3 CREAM (GRAM) TOPICAL AT BEDTIME
Refills: 0 | Status: DISCONTINUED | OUTPATIENT
Start: 2019-11-10 | End: 2019-11-13

## 2019-11-10 RX ADMIN — FINASTERIDE 5 MILLIGRAM(S): 5 TABLET, FILM COATED ORAL at 12:22

## 2019-11-10 RX ADMIN — Medication 1 APPLICATION(S): at 00:57

## 2019-11-10 RX ADMIN — Medication 165 MILLIGRAM(S): at 14:02

## 2019-11-10 RX ADMIN — Medication 1 DROP(S): at 12:18

## 2019-11-10 RX ADMIN — Medication 650 MILLIGRAM(S): at 10:05

## 2019-11-10 RX ADMIN — Medication 1 DROP(S): at 17:32

## 2019-11-10 RX ADMIN — Medication 650 MILLIGRAM(S): at 09:35

## 2019-11-10 RX ADMIN — Medication 650 MILLIGRAM(S): at 17:24

## 2019-11-10 RX ADMIN — Medication 1 DROP(S): at 06:39

## 2019-11-10 RX ADMIN — GABAPENTIN 300 MILLIGRAM(S): 400 CAPSULE ORAL at 17:33

## 2019-11-10 RX ADMIN — PANTOPRAZOLE SODIUM 40 MILLIGRAM(S): 20 TABLET, DELAYED RELEASE ORAL at 07:41

## 2019-11-10 RX ADMIN — DIPHENHYDRAMINE HYDROCHLORIDE AND LIDOCAINE HYDROCHLORIDE AND ALUMINUM HYDROXIDE AND MAGNESIUM HYDRO 10 MILLILITER(S): KIT at 17:24

## 2019-11-10 RX ADMIN — Medication 1 DROP(S): at 15:04

## 2019-11-10 RX ADMIN — Medication 100 MILLIGRAM(S): at 21:41

## 2019-11-10 RX ADMIN — Medication 650 MILLIGRAM(S): at 23:15

## 2019-11-10 RX ADMIN — Medication 2 DROP(S): at 00:57

## 2019-11-10 RX ADMIN — Medication 1 DROP(S): at 08:15

## 2019-11-10 RX ADMIN — Medication 1: at 17:03

## 2019-11-10 RX ADMIN — Medication 2: at 12:30

## 2019-11-10 RX ADMIN — Medication 165 MILLIGRAM(S): at 21:35

## 2019-11-10 RX ADMIN — Medication 650 MILLIGRAM(S): at 08:57

## 2019-11-10 RX ADMIN — Medication 1 APPLICATION(S): at 06:40

## 2019-11-10 RX ADMIN — DIPHENHYDRAMINE HYDROCHLORIDE AND LIDOCAINE HYDROCHLORIDE AND ALUMINUM HYDROXIDE AND MAGNESIUM HYDRO 10 MILLILITER(S): KIT at 07:40

## 2019-11-10 RX ADMIN — Medication 1 DROP(S): at 21:36

## 2019-11-10 RX ADMIN — Medication 100 MILLIGRAM(S): at 07:41

## 2019-11-10 RX ADMIN — Medication 75 MILLIGRAM(S): at 21:35

## 2019-11-10 RX ADMIN — CYCLOPENTOLATE HYDROCHLORIDE 1 DROP(S): 10 SOLUTION/ DROPS OPHTHALMIC at 21:37

## 2019-11-10 RX ADMIN — Medication 1 DROP(S): at 14:02

## 2019-11-10 RX ADMIN — Medication 1 APPLICATION(S): at 17:03

## 2019-11-10 RX ADMIN — LISINOPRIL 5 MILLIGRAM(S): 2.5 TABLET ORAL at 07:41

## 2019-11-10 RX ADMIN — Medication 2 DROP(S): at 17:33

## 2019-11-10 RX ADMIN — Medication 1 DROP(S): at 23:31

## 2019-11-10 RX ADMIN — APIXABAN 5 MILLIGRAM(S): 2.5 TABLET, FILM COATED ORAL at 07:39

## 2019-11-10 RX ADMIN — Medication 2 DROP(S): at 12:23

## 2019-11-10 RX ADMIN — Medication 2 DROP(S): at 23:10

## 2019-11-10 RX ADMIN — Medication 1: at 08:16

## 2019-11-10 RX ADMIN — Medication 1 DROP(S): at 12:22

## 2019-11-10 RX ADMIN — Medication 1 APPLICATION(S): at 21:38

## 2019-11-10 RX ADMIN — SODIUM CHLORIDE 50 MILLILITER(S): 5 INJECTION, SOLUTION INTRAVENOUS at 20:01

## 2019-11-10 RX ADMIN — APIXABAN 5 MILLIGRAM(S): 2.5 TABLET, FILM COATED ORAL at 17:25

## 2019-11-10 RX ADMIN — CYCLOPENTOLATE HYDROCHLORIDE 1 DROP(S): 10 SOLUTION/ DROPS OPHTHALMIC at 00:57

## 2019-11-10 RX ADMIN — CYCLOPENTOLATE HYDROCHLORIDE 1 DROP(S): 10 SOLUTION/ DROPS OPHTHALMIC at 14:03

## 2019-11-10 RX ADMIN — Medication 165 MILLIGRAM(S): at 06:39

## 2019-11-10 RX ADMIN — CYCLOPENTOLATE HYDROCHLORIDE 1 DROP(S): 10 SOLUTION/ DROPS OPHTHALMIC at 06:39

## 2019-11-10 RX ADMIN — Medication 2 DROP(S): at 07:41

## 2019-11-10 NOTE — PROGRESS NOTE ADULT - SUBJECTIVE AND OBJECTIVE BOX
GASTROENTEROLOGY    Patient seen and examined. He is feeling better today  No acute overnight events noted  Daughter was feeding him cream of wheat cereal        MEDICATIONS  (STANDING):  acyclovir IVPB 750 milliGRAM(s) IV Intermittent every 8 hours  apixaban 5 milliGRAM(s) Oral two times a day  artificial tears (preservative free) Ophthalmic Solution 1 Drop(s) Left EYE every 2 hours  cyclopentolate 1% Solution 1 Drop(s) Left EYE three times a day  dextrose 5%. 1000 milliLiter(s) (50 mL/Hr) IV Continuous <Continuous>  dextrose 50% Injectable 12.5 Gram(s) IV Push once  dextrose 50% Injectable 25 Gram(s) IV Push once  dextrose 50% Injectable 25 Gram(s) IV Push once  erythromycin   Ointment 1 Application(s) Left EYE three times a day  finasteride 5 milliGRAM(s) Oral daily  FIRST- Mouthwash  BLM 10 milliLiter(s) Swish and Spit two times a day  insulin lispro (HumaLOG) corrective regimen sliding scale   SubCutaneous three times a day before meals  insulin lispro (HumaLOG) corrective regimen sliding scale   SubCutaneous at bedtime  lisinopril 5 milliGRAM(s) Oral daily  metoprolol tartrate 100 milliGRAM(s) Oral daily  metoprolol tartrate 50 milliGRAM(s) Oral daily  pantoprazole    Tablet 40 milliGRAM(s) Oral before breakfast  prednisoLONE acetate 1% Suspension 2 Drop(s) Left EYE four times a day  Ure-Na 15 Gram(s) 15 Gram(s) Oral two times a day        T(F): 98.1 (11-10-19 @ 04:21), Max: 98.1 (11-09-19 @ 21:58)  HR: 79 (11-10-19 @ 04:21) (79 - 88)  BP: 155/88 (11-10-19 @ 04:21) (126/65 - 155/88)  RR: 19 (11-10-19 @ 04:21) (18 - 19)  SpO2: 96% (11-10-19 @ 04:21) (95% - 96%)  Wt(kg): --    PHYSICAL EXAM  GENERAL:   NAD  HEENT:  crusted zoster lesions  CHEST:  clear to ascultation bilaterally, respirations nonlabored  HEART:  +S1+S2 regular rate and rhythm   ABDOMEN:  Soft, non-tender, non-distended, + bowel sounds  EXTREMITIES:  no cyanosis, clubbing or edema  NEURO:  Alert, oriented  SKIN:  scattered zoster lesions      LABS:                        9.1<L>  2.32<L> )-----------( 133<L>    ( 09 Nov 2019 10:43 )             24.8<L>  09 Nov 2019 10:43    11-09    127<L>  |  90<L>  |  10  ----------------------------<  173<H>  3.8   |  25  |  0.77    Ca    8.3<L>      09 Nov 2019 05:51          I&O's Detail    09 Nov 2019 07:01  -  10 Nov 2019 07:00  --------------------------------------------------------  IN:    Oral Fluid: 420 mL  Total IN: 420 mL    OUT:    Voided: 1625 mL  Total OUT: 1625 mL    Total NET: -1205 mL

## 2019-11-10 NOTE — BEHAVIORAL HEALTH ASSESSMENT NOTE - COMMENTS ON SUICIDE RISK/PROTECTIVE FACTORS:
Patient is at low risk of suicide at this time as he denies SI/I/P. Risk factors include current medical illness. Protective factors include strong social support, future-oriented (looks forward to seeing his children and grandchildren).

## 2019-11-10 NOTE — PROGRESS NOTE ADULT - SUBJECTIVE AND OBJECTIVE BOX
CARDIOLOGY     PROGRESS  NOTE   ________________________________________________    CHIEF COMPLAINT:Patient is a 84y old  Male who presents with a chief complaint of zoster (10 Nov 2019 09:25)  doing better.  	  REVIEW OF SYSTEMS:  CONSTITUTIONAL: No fever, weight loss, or fatigue  EYES: No eye pain, visual disturbances, or discharge  ENT:  No difficulty hearing, tinnitus, vertigo; No sinus or throat pain  NECK: No pain or stiffness  RESPIRATORY: No cough, wheezing, chills or hemoptysis; No Shortness of Breath  CARDIOVASCULAR: No chest pain, palpitations, passing out, dizziness, or leg swelling  GASTROINTESTINAL: No abdominal or epigastric pain. No nausea, vomiting, or hematemesis; No diarrhea or constipation. No melena or hematochezia.  GENITOURINARY: No dysuria, frequency, hematuria, or incontinence  NEUROLOGICAL: No headaches, memory loss, loss of strength, numbness, or tremors  SKIN: No itching, burning, rashes, or lesions   LYMPH Nodes: No enlarged glands  ENDOCRINE: No heat or cold intolerance; No hair loss  MUSCULOSKELETAL: No joint pain or swelling; No muscle, back, or extremity pain  PSYCHIATRIC: No depression, anxiety, mood swings, or difficulty sleeping  HEME/LYMPH: No easy bruising, or bleeding gums  ALLERGY AND IMMUNOLOGIC: No hives or eczema	    [ ] All others negative	  [ ] Unable to obtain    PHYSICAL EXAM:  T(C): 36.7 (11-10-19 @ 04:21), Max: 36.7 (11-09-19 @ 21:58)  HR: 79 (11-10-19 @ 04:21) (79 - 88)  BP: 155/88 (11-10-19 @ 04:21) (126/65 - 155/88)  RR: 19 (11-10-19 @ 04:21) (18 - 19)  SpO2: 96% (11-10-19 @ 04:21) (95% - 96%)  Wt(kg): --  I&O's Summary    09 Nov 2019 07:01  -  10 Nov 2019 07:00  --------------------------------------------------------  IN: 420 mL / OUT: 1625 mL / NET: -1205 mL        Appearance: rashes are improving	  HEENT:   Normal oral mucosa, PERRL, EOMI	  Lymphatic: No lymphadenopathy  Cardiovascular: Normal S1 S2, No JVD, + murmurs, No edema  Respiratory: Lungs clear to auscultation	  Psychiatry: A & O x 3, Mood & affect appropriate  Gastrointestinal:  Soft, Non-tender, + BS	  Skin: No rashes, No ecchymoses, No cyanosis	  Neurologic: Non-focal  Extremities: Normal range of motion, No clubbing, cyanosis or edema  Vascular: Peripheral pulses palpable 2+ bilaterally    MEDICATIONS  (STANDING):  acyclovir IVPB 750 milliGRAM(s) IV Intermittent every 8 hours  apixaban 5 milliGRAM(s) Oral two times a day  artificial tears (preservative free) Ophthalmic Solution 1 Drop(s) Left EYE every 2 hours  cyclopentolate 1% Solution 1 Drop(s) Left EYE three times a day  dextrose 5%. 1000 milliLiter(s) (50 mL/Hr) IV Continuous <Continuous>  dextrose 50% Injectable 12.5 Gram(s) IV Push once  dextrose 50% Injectable 25 Gram(s) IV Push once  dextrose 50% Injectable 25 Gram(s) IV Push once  erythromycin   Ointment 1 Application(s) Left EYE three times a day  finasteride 5 milliGRAM(s) Oral daily  FIRST- Mouthwash  BLM 10 milliLiter(s) Swish and Spit two times a day  insulin lispro (HumaLOG) corrective regimen sliding scale   SubCutaneous three times a day before meals  insulin lispro (HumaLOG) corrective regimen sliding scale   SubCutaneous at bedtime  lisinopril 5 milliGRAM(s) Oral daily  metoprolol tartrate 100 milliGRAM(s) Oral daily  metoprolol tartrate 50 milliGRAM(s) Oral daily  pantoprazole    Tablet 40 milliGRAM(s) Oral before breakfast  prednisoLONE acetate 1% Suspension 2 Drop(s) Left EYE four times a day  Ure-Na 15 Gram(s) 15 Gram(s) Oral two times a day      TELEMETRY: 	    ECG:  	  RADIOLOGY:  OTHER: 	  	  LABS:	 	    CARDIAC MARKERS:                                9.1    2.32  )-----------( 133      ( 09 Nov 2019 10:43 )             24.8     11-09    127<L>  |  90<L>  |  10  ----------------------------<  173<H>  3.8   |  25  |  0.77    Ca    8.3<L>      09 Nov 2019 05:51      proBNP:   Lipid Profile:   HgA1c: Hemoglobin A1C, Whole Blood: 7.3 % (11-06 @ 09:05)    TSH: Thyroid Stimulating Hormone, Serum: 1.25 uIU/mL (11-06 @ 08:51)          Assessment and plan  ---------------------------  CHF/  Cardiomyopathy  increase beta blocker or ace to control bp  hr is well controlled  ac  abx for herpes Zoster of opthalmic area  opth follow up

## 2019-11-10 NOTE — BEHAVIORAL HEALTH ASSESSMENT NOTE - NSBHCONSULTFOLLOWAFTERCARE_PSY_A_CORE FT
If desired, patient may contact [facility] at [phone] for follow up.

## 2019-11-10 NOTE — BEHAVIORAL HEALTH ASSESSMENT NOTE - HPI (INCLUDE ILLNESS QUALITY, SEVERITY, DURATION, TIMING, CONTEXT, MODIFYING FACTORS, ASSOCIATED SIGNS AND SYMPTOMS)
The patient is an 85yo M,  with adult children, domiciled with wife, retired, with no prior psychiatric history, extensive medical history significant for CKD, marginal zone lymphoma s/p chemotherapy (July 2019), anemia, admitted for management of disseminated herpes zoster. Psychiatry is consulted for depression.    Patient's family members at bedside state that the patient is feeling sad because he has been sick recently and has required hospitalization. They say he has had episodes of "what seems like depression" over the last few years, mostly associated with being sick. Has never seen a therapist or psychiatrist, has never taken any psychiatric medications. Family is primarily concerned that the patient has been sleeping poorly since he has been ill. Of note, patient is unaware of his lymphoma diagnosis, does not know that he has been given chemotherapy. Family has informed the patient that he visits doctors because of anemia.     On interview, patient is A&Ox3. Patient reports that he feels "a little sad" because he is in the hospital. He is disappointed that his family trip to [facility] had to end early when family members noticed zoster lesions on his face. Also reports that he is sleeping poorly in the hospital because it is "not the same as being at home." States he has poor appetite and that his wife encourages him to eat. Denies other depressive symptoms including excessive guilt, poor concentration, anhedonia. Patient denies SI/HI, states that he is looking forward to returning home, seeing his children and grandchildren. Reports his family members give him hope for the future. Denies AH/VH. Denies any substance use including alcohol, tobacco, illicit drugs. Denies access to firearms.

## 2019-11-10 NOTE — PROGRESS NOTE ADULT - ASSESSMENT
84  yr  w/        HTN,   HLD,    DM (glipizide),   CKD. , CAD  on ct, . AF  on eliquis,  diastolic  chf       marginal  zone lymphoma ,    s/p chemo by dr brown,  july/ 2019/   none  recently,  , not on chemo at present   .  h/o  anemia/ low platelets              admitted  with  left face/  eyelids,  h  zoster/  with crusted lesions,  ,     face / V1  distrubution, trigeminal  nerve /  noted  by pt  and  family. around  10/ 22/ 19,  while  in [facility]/    saw   a  dr  on 10/ 24/ 19, in [place]  no lesions,  left  ear  eyes.  no  dendritic  corneal  lesions  per  ophthalmology/ superficial punctate  keratitis  noted  on iv Acyclovir   DM,  follow fs   AF , on   asa/ Eliquis/  lopressor  bid/  lisinopril/ echo  ef  56     ID  eval noted   seen by  opthalmology /  local rx   as  per  ophthalmology   hyponatremia. , stable     blood  c/s,  negative /  rvp, negative  anemia,  seen by gi  afebrile  now/  pancytopenia now/ heme eval  suspect pancytopenia  is  from  viral process/ some of  it is  chronic related  to prior  malignancy  pt has  zoster  for  about   15 days  or  more, pt  not expected to be  contagious  now   maculo papular lesions on extremities, not  typical  appearing  for  h  zoster    spoke  to  wife/   per  PT, needs  rehab  seen by heme/  on procrit   hyponatremia./ o ure Na.   BMP  pending   await duration from ID         < from: CT Chest No Cont (06.02.19 @ 20:18) >  IMPRESSION:Small bilateral pleural effusions, left greater than right.  Interlobular septal thickening representing pulmonary edema.  Small pericarial effusion.  Coronary artery atherosclerotic disease.  < end of copied text > 84  yr  w/        HTN,   HLD,    DM (glipizide),   CKD. , CAD  on ct, . AF  on eliquis,  diastolic  chf       marginal  zone lymphoma ,    s/p chemo by dr brown,  july/ 2019/   none  recently,  , not on chemo at present   .  h/o  anemia/ low platelets              admitted  with  left face/  eyelids,  h  zoster/  with crusted lesions,  ,     face / V1  distrubution, trigeminal  nerve /  noted  by pt  and  family. around  10/ 22/ 19,  while  in [facility]/    saw   a  dr  on 10/ 24/ 19, in [place]  no lesions,  left  ear  eyes.  no  dendritic  corneal  lesions  per  ophthalmology/ superficial punctate  keratitis  noted  on iv Acyclovir   DM,  follow fs   AF , on   asa/ Eliquis/  lopressor  bid/  lisinopril/ echo  ef  56     ID  eval noted   seen by  opthalmology /  local rx   as  per  ophthalmology   hyponatremia. , stable     blood  c/s,  negative /  rvp, negative  anemia,  seen by gi  afebrile  now/  pancytopenia now/ heme eval  suspect pancytopenia  is  from  viral process/ some of  it is  chronic related  to prior  malignancy  pt has  zoster  for  about   15 days  or  more, pt  not expected to be  contagious  now   maculo papular lesions on extremities, not  typical  appearing  for  h  zoster    spoke  to  wife/   per  PT, needs  rehab  seen by heme/  on procrit   hyponatremia./ on  ure Na.   BMP  pending   await duration from ID  d/c  to  rehab/, next  week         < from: CT Chest No Cont (06.02.19 @ 20:18) >  IMPRESSION:Small bilateral pleural effusions, left greater than right.  Interlobular septal thickening representing pulmonary edema.  Small pericarial effusion.  Coronary artery atherosclerotic disease.  < end of copied text >

## 2019-11-10 NOTE — BEHAVIORAL HEALTH ASSESSMENT NOTE - CASE SUMMARY
Patient/family denies persistent depressed mood, pt reports he is upset that he is ill, however was not sad otherwise.  No suicidality expressed.

## 2019-11-10 NOTE — CONSULT NOTE ADULT - SUBJECTIVE AND OBJECTIVE BOX
Dr. Oquendo  Office [phone]  Cell [phone]  Zofia HITCHCOCK  Cell [phone]    HPI:  84  yr  w/ HTN,   HLD,    DM (glipizide),   CKD. , CAD  on ct, . AF  on eliquis,  diastolic  chf, marginal  zone lymphoma , s/p chemo by dr brown,  2019/ not on chemo at present h/o  anemia/ low platelets admitted with herpes zoster, has hyponatremia that's how I got involved    Allergies:  No Known Allergies      PAST MEDICAL & SURGICAL HISTORY:  Atrial fibrillation  Anemia  DM2 (diabetes mellitus, type 2)  Osteoporosis  GERD (gastroesophageal reflux disease)  Benign prostatic hypertrophy  HTN (hypertension)  No significant past surgical history      Home Medications Reviewed    Hospital Medications:   MEDICATIONS  (STANDING):  acyclovir IVPB 750 milliGRAM(s) IV Intermittent every 8 hours  apixaban 5 milliGRAM(s) Oral two times a day  artificial tears (preservative free) Ophthalmic Solution 1 Drop(s) Left EYE every 2 hours  cyclopentolate 1% Solution 1 Drop(s) Left EYE three times a day  dextrose 5%. 1000 milliLiter(s) (50 mL/Hr) IV Continuous <Continuous>  dextrose 50% Injectable 12.5 Gram(s) IV Push once  dextrose 50% Injectable 25 Gram(s) IV Push once  dextrose 50% Injectable 25 Gram(s) IV Push once  erythromycin   Ointment 1 Application(s) Left EYE three times a day  finasteride 5 milliGRAM(s) Oral daily  FIRST- Mouthwash  BLM 10 milliLiter(s) Swish and Spit two times a day  gabapentin 300 milliGRAM(s) Oral two times a day  insulin lispro (HumaLOG) corrective regimen sliding scale   SubCutaneous three times a day before meals  insulin lispro (HumaLOG) corrective regimen sliding scale   SubCutaneous at bedtime  lisinopril 5 milliGRAM(s) Oral daily  metoprolol tartrate 100 milliGRAM(s) Oral daily  metoprolol tartrate 75 milliGRAM(s) Oral at bedtime  pantoprazole    Tablet 40 milliGRAM(s) Oral before breakfast  prednisoLONE acetate 1% Suspension 2 Drop(s) Left EYE four times a day  Ure-Na 15 Gram(s) 15 Gram(s) Oral two times a day      SOCIAL HISTORY:  Denies ETOh, Smoking,     FAMILY HISTORY:      REVIEW OF SYSTEMS:  CONSTITUTIONAL: has weakness, no fevers or chills  EYES/ENT: No visual changes;  No vertigo or throat pain   NECK: No pain or stiffness  RESPIRATORY: coughing off and on  CARDIOVASCULAR: No chest pain or palpitations.  GASTROINTESTINAL: No abdominal or epigastric pain. No nausea, vomiting, or hematemesis; No diarrhea or constipation. No melena or hematochezia.  GENITOURINARY: No dysuria, frequency, foamy urine, urinary urgency, incontinence or hematuria  NEUROLOGICAL: No numbness or weakness  SKIN: has skin lesions   VASCULAR: No bilateral lower extremity edema.   All other review of systems is negative unless indicated above.    VITALS:  T(F): 97.3 (11-10-19 @ 14:16), Max: 98.1 (19 @ 21:58)  HR: 86 (11-10-19 @ 14:16)  BP: 162/74 (11-10-19 @ 14:16)  RR: 18 (11-10-19 @ 14:16)  SpO2: 94% (11-10-19 @ 14:16)  Wt(kg): --     @ 07:01  -  11-10 @ 07:00  --------------------------------------------------------  IN: 420 mL / OUT: 1625 mL / NET: -1205 mL    11-10 @ 07:  -  11-10 @ 15:53  --------------------------------------------------------  IN: 240 mL / OUT: 100 mL / NET: 140 mL          PHYSICAL EXAM:  Constitutional: NAD  HEENT: anicteric sclera, oropharynx clear, MMM  Neck: No JVD  Respiratory: CTAB, no wheezes, rales or rhonchi  Cardiovascular: S1, S2, RRR  Gastrointestinal: BS+, soft, NT/ND  Extremities: No cyanosis or clubbing. No peripheral edema  Neurological: A/O x 3, no focal deficits  Psychiatric: Normal mood, normal affect  : No CVA tenderness. No frankel.   Skin: zoster lesion above left eye       LABS:      127<L>  |  90<L>  |  10  ----------------------------<  173<H>  3.8   |  25  |  0.77    Ca    8.3<L>      2019 05:51      Creatinine Trend: 0.77 <--, 0.85 <--, 0.86 <--, 0.98 <--, 1.10 <--                        9.1    2.32  )-----------( 133      ( 2019 10:43 )             24.8     Urine Studies:  Urinalysis Basic - ( 2019 01:42 )    Color: Yellow / Appearance: Clear / S.020 / pH:   Gluc:  / Ketone: Trace  / Bili: Negative / Urobili: 2 mg/dL   Blood:  / Protein: Trace / Nitrite: Negative   Leuk Esterase: Negative / RBC: 2 /hpf / WBC 2 /HPF   Sq Epi:  / Non Sq Epi: 1 /hpf / Bacteria: Few          RADIOLOGY & ADDITIONAL STUDIES:

## 2019-11-10 NOTE — BEHAVIORAL HEALTH ASSESSMENT NOTE - SUMMARY
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 Psychiatry is consulted for depression. Patient endorses sadness related to his current medical illness and hospitalization, but brightens when discussing returning home. Poor appetite and sleep appear to be related to current medical illness, are improving as treatment course continues.

## 2019-11-10 NOTE — CONSULT NOTE ADULT - ASSESSMENT
84  yr  w/ HTN,   HLD,    DM (glipizide),   CKD. , CAD  on ct, . 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

## 2019-11-10 NOTE — PROGRESS NOTE ADULT - ASSESSMENT
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  Given his age, medical co-morbidities,lymphoma and rituxan therapy he was started on  IV treatment   He has facial involvement and cutaneous dissemination.  The patient clinically has improved the swelling in his left side of his face has improved  blood culture so far are no growth   reviewed his urine cx reported growing morganella but the patient does not have any  symptoms no dysuria so likely asymptomatic bacteruria   The family at the bedside reports that he has be on chronic daily suppression antibiotics because he has history of UTI's, he does not remember the name of the antibiotics but it has helped prevent  UTI 's   he remains afebrile  He reports some pain left side of head/forehead he  may have  post herpetic neuralgia at the left side of his head near rash      Suggest:  1.	 Day # 6 of 7 IV acyclovir for zoster   2.	 reviewed with the wife at the bedside that rash is much improved lesions crusted over complete course of antivirals as planned

## 2019-11-10 NOTE — PROGRESS NOTE ADULT - SUBJECTIVE AND OBJECTIVE BOX
CC: f/u for disseminated zoster improved lesions have crusted over and drying     Patient is sitting up in chair, no fevers overnight,    REVIEW OF SYSTEMS:  All other review of systems negative (Comprehensive ROS)      Other Medications Reviewed    Vital Signs Last 24 Hrs  T(C): 36.7 (10 Nov 2019 04:21), Max: 36.7 (2019 21:58)  T(F): 98.1 (10 Nov 2019 04:21), Max: 98.1 (2019 21:58)  HR: 79 (10 Nov 2019 04:21) (79 - 88)  BP: 155/88 (10 Nov 2019 04:21) (126/65 - 155/88)  BP(mean): --  RR: 19 (10 Nov 2019 04:21) (18 - 19)  SpO2: 96% (10 Nov 2019 04:21) (95% - 96%)    PHYSICAL EXAM:  General: alert, no acute distress  Eyes:  anicteric, left sided conjunctival injection, no discharge  left side of his forehead he has dried up crusted lesions healing   Oropharynx: noted on his lip he has dried up lesion 	  Neck: supple, without adenopathy  Lungs: clear to auscultation  Heart: irregular rate and rhythm; no murmur, rubs or gallops  Abdomen: soft, nondistended, nontender, without mass or organomegaly  Skin: scattered Zoster lesions on trunk and extremities, healing facial lesions  Extremities: no clubbing, cyanosis, or edema  Neurologic: alert, oriented, moves all extremities    LAB RESULTS:                                     9.1    2.32  )-----------( 133      ( 2019 10:43 )             24.8                           9.0    2.41  )-----------( 118      ( 2019 08:07 )             25.2       11-    127<L>  |  90<L>  |  10  ----------------------------<  173<H>  3.8   |  25  |  0.77    Ca    8.3<L>      2019 05:51        Urinalysis Basic - ( 2019 01:42 )    Color: Yellow / Appearance: Clear / S.020 / pH: x  Gluc: x / Ketone: Trace  / Bili: Negative / Urobili: 2 mg/dL   Blood: x / Protein: Trace / Nitrite: Negative   Leuk Esterase: Negative / RBC: 2 /hpf / WBC 2 /HPF   Sq Epi: x / Non Sq Epi: 1 /hpf / Bacteria: Few      MICROBIOLOGY:  RECENT CULTURES:   @ 05:40 .Blood Blood-Peripheral     No growth to date.       @ 05:27 .Urine Clean Catch (Midstream)     10,000 - 49,000 CFU/mL Gram Negative Rods       @ 03:06 .Blood Blood-Peripheral     No growth to date.          RADIOLOGY REVIEWED:    < from: Xray Chest 1 View- PORTABLE-Urgent (19 @ 01:01) >    IMPRESSION:   Clear lungs.     < end of copied text >

## 2019-11-10 NOTE — BEHAVIORAL HEALTH ASSESSMENT NOTE - RISK ASSESSMENT
Low Acute Suicide Risk Patient is at low acute risk of suicide at this time as he denies SI/I/P. Risk factors include current medical illness. Protective factors include strong social support, feels responsible to family members.

## 2019-11-10 NOTE — BEHAVIORAL HEALTH ASSESSMENT NOTE - NSBHCHARTREVIEWLAB_PSY_A_CORE FT
9.1    2.32  )-----------( 133      ( 09 Nov 2019 10:43 )             24.8   11-09    127<L>  |  90<L>  |  10  ----------------------------<  173<H>  3.8   |  25  |  0.77    Ca    8.3<L>      09 Nov 2019 05:51

## 2019-11-10 NOTE — PROGRESS NOTE ADULT - SUBJECTIVE AND OBJECTIVE BOX
resting/ no cp/sob    REVIEW OF SYSTEMS:  GEN: no fever,    no chills  RESP: no SOB,   no cough  CVS: no chest pain,   no palpitations  GI: no abdominal pain,   no nausea,   no vomiting,   no constipation,   no diarrhea  : no dysuria,   no frequency  NEURO: no headache,   no dizziness  PSYCH: no depression,   not anxious  Derm :  resolving  rash    MEDICATIONS  (STANDING):  acyclovir IVPB 750 milliGRAM(s) IV Intermittent every 8 hours  apixaban 5 milliGRAM(s) Oral two times a day  artificial tears (preservative free) Ophthalmic Solution 1 Drop(s) Left EYE every 2 hours  cyclopentolate 1% Solution 1 Drop(s) Left EYE three times a day  dextrose 5%. 1000 milliLiter(s) (50 mL/Hr) IV Continuous <Continuous>  dextrose 50% Injectable 12.5 Gram(s) IV Push once  dextrose 50% Injectable 25 Gram(s) IV Push once  dextrose 50% Injectable 25 Gram(s) IV Push once  erythromycin   Ointment 1 Application(s) Left EYE three times a day  finasteride 5 milliGRAM(s) Oral daily  FIRST- Mouthwash  BLM 10 milliLiter(s) Swish and Spit two times a day  insulin lispro (HumaLOG) corrective regimen sliding scale   SubCutaneous three times a day before meals  insulin lispro (HumaLOG) corrective regimen sliding scale   SubCutaneous at bedtime  lisinopril 5 milliGRAM(s) Oral daily  metoprolol tartrate 100 milliGRAM(s) Oral daily  metoprolol tartrate 50 milliGRAM(s) Oral daily  pantoprazole    Tablet 40 milliGRAM(s) Oral before breakfast  prednisoLONE acetate 1% Suspension 2 Drop(s) Left EYE four times a day  Ure-Na 15 Gram(s) 15 Gram(s) Oral two times a day    MEDICATIONS  (PRN):  dextrose 40% Gel 15 Gram(s) Oral once PRN Blood Glucose LESS THAN 70 milliGRAM(s)/deciliter  glucagon  Injectable 1 milliGRAM(s) IntraMuscular once PRN Glucose LESS THAN 70 milligrams/deciliter  guaiFENesin   Syrup  (Sugar-Free) 100 milliGRAM(s) Oral every 6 hours PRN Cough      Vital Signs Last 24 Hrs  T(C): 36.7 (10 Nov 2019 04:21), Max: 36.7 (09 Nov 2019 21:58)  T(F): 98.1 (10 Nov 2019 04:21), Max: 98.1 (09 Nov 2019 21:58)  HR: 79 (10 Nov 2019 04:21) (79 - 88)  BP: 155/88 (10 Nov 2019 04:21) (126/65 - 155/88)  BP(mean): --  RR: 19 (10 Nov 2019 04:21) (18 - 19)  SpO2: 96% (10 Nov 2019 04:21) (95% - 96%)  CAPILLARY BLOOD GLUCOSE      POCT Blood Glucose.: 178 mg/dL (10 Nov 2019 08:12)  POCT Blood Glucose.: 207 mg/dL (09 Nov 2019 21:57)  POCT Blood Glucose.: 173 mg/dL (09 Nov 2019 17:03)  POCT Blood Glucose.: 178 mg/dL (09 Nov 2019 11:54)    I&O's Summary    09 Nov 2019 07:01  -  10 Nov 2019 07:00  --------------------------------------------------------  IN: 420 mL / OUT: 1625 mL / NET: -1205 mL        PHYSICAL EXAM:  HEAD:  Atraumatic, Normocephalic  NECK: Supple, No   JVD  CHEST/LUNG:   no     rales,     no,    rhonchi  HEART: Regular rate and rhythm;         murmur  ABDOMEN: Soft, Nontender, ;   EXTREMITIES:   no pedal    edema  NEUROLOGY:  alert    LABS:                        9.1    2.32  )-----------( 133      ( 09 Nov 2019 10:43 )             24.8     11-09    127<L>  |  90<L>  |  10  ----------------------------<  173<H>  3.8   |  25  |  0.77    Ca    8.3<L>      09 Nov 2019 05:51                    Hemoglobin A1C, Whole Blood: 7.3 % (11-06 @ 09:05)    Thyroid Stimulating Hormone, Serum: 1.25 uIU/mL (11-06 @ 08:51)          Consultant(s) Notes Reviewed:      Care Discussed with Consultants/Other Providers:

## 2019-11-10 NOTE — BEHAVIORAL HEALTH ASSESSMENT NOTE - NSBHCHARTREVIEWINVESTIGATE_PSY_A_CORE FT
< from: 12 Lead ECG (11.05.19 @ 01:40) >    Ventricular Rate 151 BPM    Atrial Rate 113 BPM    QRS Duration 98 ms    Q-T Interval 308 ms    QTC Calculation(Bezet) 488 ms    R Axis -64 degrees    T Axis 92 degrees    Diagnosis Line ATRIAL FIBRILLATION WITH RAPID VENTRICULAR RESPONSE WITH PREMATURE VENTRICULAR OR ABERRANTLY CONDUCTED COMPLEXES  LEFT ANTERIOR FASCICULAR BLOCK  SEPTAL INFARCT , AGE UNDETERMINED  ABNORMAL ECG

## 2019-11-10 NOTE — BEHAVIORAL HEALTH ASSESSMENT NOTE - SUICIDE PROTECTIVE FACTORS
Responsibility to family and others/Supportive social network of family or friends/Has future plans/Identifies reasons for living

## 2019-11-10 NOTE — BEHAVIORAL HEALTH ASSESSMENT NOTE - NSBHCHARTREVIEWVS_PSY_A_CORE FT
Vital Signs Last 24 Hrs  T(C): 36.3 (10 Nov 2019 14:16), Max: 36.7 (09 Nov 2019 21:58)  T(F): 97.3 (10 Nov 2019 14:16), Max: 98.1 (09 Nov 2019 21:58)  HR: 86 (10 Nov 2019 14:16) (79 - 88)  BP: 162/74 (10 Nov 2019 14:16) (126/65 - 162/74)  RR: 18 (10 Nov 2019 14:16) (18 - 19)  SpO2: 94% (10 Nov 2019 14:16) (94% - 96%)

## 2019-11-10 NOTE — CHART NOTE - NSCHARTNOTEFT_GEN_A_CORE
Hyponatremia Na 124 discussed with renal Dr. Oates.  Acyclovir can not be in Normal saline as per pharmacy, need to be mixed to D5W. last dose of Acyclovir in am.  Dr. Oates recommended to start 1.5 % Nacl at 50 ml for 10 hours, BMP in 4 hours after 1.5% started and Free water fluid intake restriction 1 Liter /daily.  Dr. Qiu was called and discussed about plan for 1.5 % NAcl for hyponatremia treatment as requested by family. Dr. Qiu agree with the plan.    Maliha Leon NP-C  #[id number]

## 2019-11-10 NOTE — PROGRESS NOTE ADULT - PROBLEM SELECTOR PLAN 1
monitor H/H daily, transfuse prn  hgb relatively stable over the past few days  await today's cbc  no overt signs of GIB   Anemia workup reviewed   heme/onc following  cont PPI PO for GI ppx   No GI contraindication to continue AC   monitor stool color closely while on AC   o/p f/u scheduled with Dr. 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

## 2019-11-11 LAB
ANION GAP SERPL CALC-SCNC: 12 MMOL/L — SIGNIFICANT CHANGE UP (ref 5–17)
ANION GAP SERPL CALC-SCNC: 7 MMOL/L — SIGNIFICANT CHANGE UP (ref 5–17)
BUN SERPL-MCNC: 16 MG/DL — SIGNIFICANT CHANGE UP (ref 7–23)
BUN SERPL-MCNC: 19 MG/DL — SIGNIFICANT CHANGE UP (ref 7–23)
CALCIUM SERPL-MCNC: 8.7 MG/DL — SIGNIFICANT CHANGE UP (ref 8.4–10.5)
CALCIUM SERPL-MCNC: 8.7 MG/DL — SIGNIFICANT CHANGE UP (ref 8.4–10.5)
CHLORIDE SERPL-SCNC: 89 MMOL/L — LOW (ref 96–108)
CHLORIDE SERPL-SCNC: 90 MMOL/L — LOW (ref 96–108)
CO2 SERPL-SCNC: 27 MMOL/L — SIGNIFICANT CHANGE UP (ref 22–31)
CO2 SERPL-SCNC: 27 MMOL/L — SIGNIFICANT CHANGE UP (ref 22–31)
CREAT SERPL-MCNC: 0.82 MG/DL — SIGNIFICANT CHANGE UP (ref 0.5–1.3)
CREAT SERPL-MCNC: 0.82 MG/DL — SIGNIFICANT CHANGE UP (ref 0.5–1.3)
GLUCOSE BLDC GLUCOMTR-MCNC: 180 MG/DL — HIGH (ref 70–99)
GLUCOSE BLDC GLUCOMTR-MCNC: 182 MG/DL — HIGH (ref 70–99)
GLUCOSE BLDC GLUCOMTR-MCNC: 188 MG/DL — HIGH (ref 70–99)
GLUCOSE BLDC GLUCOMTR-MCNC: 225 MG/DL — HIGH (ref 70–99)
GLUCOSE SERPL-MCNC: 188 MG/DL — HIGH (ref 70–99)
GLUCOSE SERPL-MCNC: 194 MG/DL — HIGH (ref 70–99)
OSMOLALITY SERPL: 273 MOSMOL/KG — LOW (ref 280–301)
OSMOLALITY UR: 368 MOSM/KG — SIGNIFICANT CHANGE UP (ref 50–1200)
POTASSIUM SERPL-MCNC: 3.9 MMOL/L — SIGNIFICANT CHANGE UP (ref 3.5–5.3)
POTASSIUM SERPL-MCNC: 4.2 MMOL/L — SIGNIFICANT CHANGE UP (ref 3.5–5.3)
POTASSIUM SERPL-SCNC: 3.9 MMOL/L — SIGNIFICANT CHANGE UP (ref 3.5–5.3)
POTASSIUM SERPL-SCNC: 4.2 MMOL/L — SIGNIFICANT CHANGE UP (ref 3.5–5.3)
SODIUM SERPL-SCNC: 124 MMOL/L — LOW (ref 135–145)
SODIUM SERPL-SCNC: 128 MMOL/L — LOW (ref 135–145)

## 2019-11-11 PROCEDURE — 99233 SBSQ HOSP IP/OBS HIGH 50: CPT

## 2019-11-11 RX ORDER — ACETAMINOPHEN 500 MG
650 TABLET ORAL ONCE
Refills: 0 | Status: COMPLETED | OUTPATIENT
Start: 2019-11-11 | End: 2019-11-11

## 2019-11-11 RX ORDER — ERYTHROPOIETIN 10000 [IU]/ML
10000 INJECTION, SOLUTION INTRAVENOUS; SUBCUTANEOUS ONCE
Refills: 0 | Status: COMPLETED | OUTPATIENT
Start: 2019-11-11 | End: 2019-11-11

## 2019-11-11 RX ADMIN — Medication 1 DROP(S): at 11:18

## 2019-11-11 RX ADMIN — CYCLOPENTOLATE HYDROCHLORIDE 1 DROP(S): 10 SOLUTION/ DROPS OPHTHALMIC at 13:29

## 2019-11-11 RX ADMIN — Medication 1: at 08:39

## 2019-11-11 RX ADMIN — DIPHENHYDRAMINE HYDROCHLORIDE AND LIDOCAINE HYDROCHLORIDE AND ALUMINUM HYDROXIDE AND MAGNESIUM HYDRO 10 MILLILITER(S): KIT at 05:14

## 2019-11-11 RX ADMIN — Medication 165 MILLIGRAM(S): at 05:12

## 2019-11-11 RX ADMIN — Medication 100 MILLIGRAM(S): at 20:09

## 2019-11-11 RX ADMIN — PANTOPRAZOLE SODIUM 40 MILLIGRAM(S): 20 TABLET, DELAYED RELEASE ORAL at 05:14

## 2019-11-11 RX ADMIN — Medication 1: at 13:13

## 2019-11-11 RX ADMIN — Medication 1 DROP(S): at 17:26

## 2019-11-11 RX ADMIN — Medication 1 DROP(S): at 13:28

## 2019-11-11 RX ADMIN — Medication 1 DROP(S): at 15:23

## 2019-11-11 RX ADMIN — Medication 100 MILLIGRAM(S): at 05:13

## 2019-11-11 RX ADMIN — Medication 1 APPLICATION(S): at 21:37

## 2019-11-11 RX ADMIN — Medication 650 MILLIGRAM(S): at 00:38

## 2019-11-11 RX ADMIN — APIXABAN 5 MILLIGRAM(S): 2.5 TABLET, FILM COATED ORAL at 17:25

## 2019-11-11 RX ADMIN — Medication 2 DROP(S): at 17:25

## 2019-11-11 RX ADMIN — Medication 1: at 17:25

## 2019-11-11 RX ADMIN — Medication 75 MILLIGRAM(S): at 21:37

## 2019-11-11 RX ADMIN — Medication 1 APPLICATION(S): at 05:15

## 2019-11-11 RX ADMIN — GABAPENTIN 300 MILLIGRAM(S): 400 CAPSULE ORAL at 06:17

## 2019-11-11 RX ADMIN — Medication 1 DROP(S): at 20:10

## 2019-11-11 RX ADMIN — ERYTHROPOIETIN 10000 UNIT(S): 10000 INJECTION, SOLUTION INTRAVENOUS; SUBCUTANEOUS at 13:28

## 2019-11-11 RX ADMIN — FINASTERIDE 5 MILLIGRAM(S): 5 TABLET, FILM COATED ORAL at 11:46

## 2019-11-11 RX ADMIN — Medication 1 DROP(S): at 23:30

## 2019-11-11 RX ADMIN — Medication 2 DROP(S): at 05:15

## 2019-11-11 RX ADMIN — Medication 1 DROP(S): at 08:40

## 2019-11-11 RX ADMIN — Medication 2 DROP(S): at 23:30

## 2019-11-11 RX ADMIN — Medication 1 DROP(S): at 21:37

## 2019-11-11 RX ADMIN — APIXABAN 5 MILLIGRAM(S): 2.5 TABLET, FILM COATED ORAL at 05:13

## 2019-11-11 RX ADMIN — CYCLOPENTOLATE HYDROCHLORIDE 1 DROP(S): 10 SOLUTION/ DROPS OPHTHALMIC at 05:14

## 2019-11-11 RX ADMIN — Medication 650 MILLIGRAM(S): at 20:40

## 2019-11-11 RX ADMIN — GABAPENTIN 300 MILLIGRAM(S): 400 CAPSULE ORAL at 17:25

## 2019-11-11 RX ADMIN — Medication 2 DROP(S): at 11:46

## 2019-11-11 RX ADMIN — Medication 1 DROP(S): at 05:13

## 2019-11-11 RX ADMIN — LISINOPRIL 5 MILLIGRAM(S): 2.5 TABLET ORAL at 05:14

## 2019-11-11 RX ADMIN — Medication 1 APPLICATION(S): at 13:29

## 2019-11-11 RX ADMIN — Medication 650 MILLIGRAM(S): at 20:10

## 2019-11-11 RX ADMIN — DIPHENHYDRAMINE HYDROCHLORIDE AND LIDOCAINE HYDROCHLORIDE AND ALUMINUM HYDROXIDE AND MAGNESIUM HYDRO 10 MILLILITER(S): KIT at 17:26

## 2019-11-11 NOTE — PROGRESS NOTE ADULT - SUBJECTIVE AND OBJECTIVE BOX
CARDIOLOGY     PROGRESS  NOTE   ________________________________________________    CHIEF COMPLAINT:Patient is a 84y old  Male who presents with a chief complaint of zoster (10 Nov 2019 15:53)  doing better.  	  REVIEW OF SYSTEMS:  CONSTITUTIONAL: No fever, weight loss, or fatigue  EYES: No eye pain, visual disturbances, or discharge  ENT:  No difficulty hearing, tinnitus, vertigo; No sinus or throat pain  NECK: No pain or stiffness  RESPIRATORY: No cough, wheezing, chills or hemoptysis; No Shortness of Breath  CARDIOVASCULAR: No chest pain, palpitations, passing out, dizziness, or leg swelling  GASTROINTESTINAL: No abdominal or epigastric pain. No nausea, vomiting, or hematemesis; No diarrhea or constipation. No melena or hematochezia.  GENITOURINARY: No dysuria, frequency, hematuria, or incontinence  NEUROLOGICAL: No headaches, memory loss, loss of strength, numbness, or tremors  SKIN: No itching, burning, rashes, or lesions   LYMPH Nodes: No enlarged glands  ENDOCRINE: No heat or cold intolerance; No hair loss  MUSCULOSKELETAL: No joint pain or swelling; No muscle, back, or extremity pain  PSYCHIATRIC: No depression, anxiety, mood swings, or difficulty sleeping  HEME/LYMPH: No easy bruising, or bleeding gums  ALLERGY AND IMMUNOLOGIC: No hives or eczema	    [ ] All others negative	  [ ] Unable to obtain    PHYSICAL EXAM:  T(C): 36.6 (11-11-19 @ 05:11), Max: 36.7 (11-11-19 @ 02:28)  HR: 76 (11-11-19 @ 05:11) (74 - 86)  BP: 151/69 (11-11-19 @ 05:11) (136/82 - 162/74)  RR: 18 (11-11-19 @ 05:11) (18 - 18)  SpO2: 97% (11-11-19 @ 05:11) (94% - 98%)  Wt(kg): --  I&O's Summary    10 Nov 2019 07:01  -  11 Nov 2019 07:00  --------------------------------------------------------  IN: 840 mL / OUT: 850 mL / NET: -10 mL        Appearance: Normal , facial rashes are resolving  HEENT:   Normal oral mucosa, PERRL, EOMI	  Lymphatic: No lymphadenopathy  Cardiovascular: Normal S1 S2, No JVD, + murmurs, No edema  Respiratory: Lungs clear to auscultation	  Psychiatry: A & O x 3, Mood & affect appropriate  Gastrointestinal:  Soft, Non-tender, + BS	  Skin: No rashes, No ecchymoses, No cyanosis	  Neurologic: Non-focal  Extremities: Normal range of motion, No clubbing, cyanosis or edema  Vascular: Peripheral pulses palpable 2+ bilaterally    MEDICATIONS  (STANDING):  acyclovir IVPB 750 milliGRAM(s) IV Intermittent every 8 hours  apixaban 5 milliGRAM(s) Oral two times a day  artificial tears (preservative free) Ophthalmic Solution 1 Drop(s) Left EYE every 2 hours  cyclopentolate 1% Solution 1 Drop(s) Left EYE three times a day  dextrose 5%. 1000 milliLiter(s) (50 mL/Hr) IV Continuous <Continuous>  dextrose 50% Injectable 12.5 Gram(s) IV Push once  dextrose 50% Injectable 25 Gram(s) IV Push once  dextrose 50% Injectable 25 Gram(s) IV Push once  erythromycin   Ointment 1 Application(s) Left EYE three times a day  finasteride 5 milliGRAM(s) Oral daily  FIRST- Mouthwash  BLM 10 milliLiter(s) Swish and Spit two times a day  gabapentin 300 milliGRAM(s) Oral two times a day  insulin lispro (HumaLOG) corrective regimen sliding scale   SubCutaneous three times a day before meals  insulin lispro (HumaLOG) corrective regimen sliding scale   SubCutaneous at bedtime  lisinopril 5 milliGRAM(s) Oral daily  metoprolol tartrate 75 milliGRAM(s) Oral at bedtime  metoprolol tartrate 100 milliGRAM(s) Oral daily  pantoprazole    Tablet 40 milliGRAM(s) Oral before breakfast  prednisoLONE acetate 1% Suspension 2 Drop(s) Left EYE four times a day  sodium chloride 1.5%. 500 milliLiter(s) (50 mL/Hr) IV Continuous <Continuous>  Ure-Na 15 Gram(s) 15 Gram(s) Oral two times a day      TELEMETRY: 	    ECG:  	  RADIOLOGY:  OTHER: 	  	  LABS:	 	    CARDIAC MARKERS:                                9.1    2.32  )-----------( 133      ( 09 Nov 2019 10:43 )             24.8     11-11    128<L>  |  89<L>  |  16  ----------------------------<  194<H>  4.2   |  27  |  0.82    Ca    8.7      11 Nov 2019 07:16      proBNP:   Lipid Profile:   HgA1c: Hemoglobin A1C, Whole Blood: 7.3 % (11-06 @ 09:05)    TSH: Thyroid Stimulating Hormone, Serum: 1.25 uIU/mL (11-06 @ 08:51)          Assessment and plan  ---------------------------  chf/cardiomyopathy  continue current meds will increase lisinopril as tolerated  hyponatremia, discussed with renal if fluid overload lasix prn  continue ac  hr is well controlled

## 2019-11-11 NOTE — PROGRESS NOTE ADULT - ASSESSMENT
Patient came to hospital straight from airport landing from [facility] with tiredness, fatigue, rash across his L face/eyefor 3 days, told to have shingles  Patient has past history of A Fib, CHF, DM, HTN, HLD,and marginal zone lymphoma , s/p 4 cycles of chemo in mid 2019.  Had a 102.9 fever, was given fluids, a dose of zosyn and started on IV ACV. optho has seen, no deep ocular involvement. He is alert, denies any headache, is able to follow commands. He has about 20 scattered lesions over body, trunk amd all 4 extremities. His left facial lesions have crusted.  Hem consult/follow because of cytopenias, wife had stated he gets procrit with his primary hem/onc and also Rituxan wvery 2 months but was in remission from lymphoma   Zoster is improving.   His anemia has component of illness, hydration etc. However since iron and B12 are OK, I  gave him procrit for anemia of Malignancy, and chemo last week and again today. He may not respond well because of his illness and it takes time anyway. I had to repeatedly address wife's concern about his anemia.  His mild neutropenia and thrombocytopenia is likely related to viral illness and can be observed. Platelets are already improving  Last Rituxan was one month back and he gets it every 2 months and would not have been given with active infection anyway  Follow ID instructions.  He is also being followed for hyponatremia

## 2019-11-11 NOTE — PROGRESS NOTE ADULT - ASSESSMENT
84  yr  w/ HTN,   HLD,    DM (glipizide),   CKD. , CAD  on ct, . AF  on eliquis,  diastolic  chf, marginal  zone lymphoma , s/p chemo by dr brown,  july/ 2019/ not on chemo at present h/o anemia/ low platelets admitted with herpes zoster. 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

## 2019-11-11 NOTE — PROGRESS NOTE ADULT - SUBJECTIVE AND OBJECTIVE BOX
CC: f/u for disseminated zoster    Patient reports: he is sleepy, no focal complaints    REVIEW OF SYSTEMS:  All other review of systems negative (Comprehensive ROS): no active skin lesions, left facial lesions and rest of body lesions are scabbed    Antimicrobials Day #  :s/p 1 week of ACV    Other Medications Reviewed    T(F): 97.5 (11-11-19 @ 11:56), Max: 98 (11-11-19 @ 02:28)  HR: 88 (11-11-19 @ 11:56)  BP: 154/73 (11-11-19 @ 11:56)  RR: 18 (11-11-19 @ 11:56)  SpO2: 97% (11-11-19 @ 11:56)  Wt(kg): --    PHYSICAL EXAM:  General: alert, no acute distress, left forehead scabbed  Eyes:  anicteric, no conjunctival injection, no discharge  Oropharynx: no lesions or injection 	  Neck: supple, without adenopathy  Lungs: clear to auscultation  Heart: irregular rate and rhythm; no murmur, rubs or gallops  Abdomen: soft, nondistended, nontender, without mass or organomegaly  Skin: generalized lesions scabbed  Extremities: no clubbing, cyanosis, or edema  Neurologic: alert, oriented, moves all extremities    LAB RESULTS:    11-11    128<L>  |  89<L>  |  16  ----------------------------<  194<H>  4.2   |  27  |  0.82    Ca    8.7      11 Nov 2019 07:16          MICROBIOLOGY:  RECENT CULTURES:      RADIOLOGY REVIEWED:

## 2019-11-11 NOTE — PROGRESS NOTE ADULT - ASSESSMENT
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  Given his age, medical co-morbidities,lymphoma and rituxan therapy he was started on  IV treatment   He has facial involvement and cutaneous dissemination.  The patient clinically has improved the swelling in his left side of his face has improved  blood culture so far are no growth   reviewed his urine cx reported growing morganella but the patient does not have any  symptoms no dysuria so likely asymptomatic bacteruria   The family at the bedside reports that he has be on chronic daily suppression antibiotics because he has history of UTI's, he does not remember the name of the antibiotics but it has helped prevent  UTI 's   he remains afebrile  He reports some pain left side of head/forehead he  may have  post herpetic neuralgia at the left side of his head near rash  S/P 7 days of IV ACV  If okay with infection control RN's I would stop his isolation  Suggest:  1.No need for IV ACV  2.He may benefit from valtrex suppression but as he is no longer receiving chemo perhaps he can be simply oberved  3.No ID objection to discharge planning  4.I think his isolation can be stopped, will defer to infection prevention dep't.

## 2019-11-11 NOTE — PROGRESS NOTE ADULT - SUBJECTIVE AND OBJECTIVE BOX
no complaints    REVIEW OF SYSTEMS:  GEN: no fever,    no chills  RESP: no SOB,   no cough  CVS: no chest pain,   no palpitations  GI: no abdominal pain,   no nausea,   no vomiting,   no constipation,   no diarrhea  : no dysuria,   no frequency  NEURO: no headache,   no dizziness  PSYCH: no depression,   not anxious  Derm : no rash    MEDICATIONS  (STANDING):  acyclovir IVPB 750 milliGRAM(s) IV Intermittent every 8 hours  apixaban 5 milliGRAM(s) Oral two times a day  artificial tears (preservative free) Ophthalmic Solution 1 Drop(s) Left EYE every 2 hours  cyclopentolate 1% Solution 1 Drop(s) Left EYE three times a day  dextrose 5%. 1000 milliLiter(s) (50 mL/Hr) IV Continuous <Continuous>  dextrose 50% Injectable 12.5 Gram(s) IV Push once  dextrose 50% Injectable 25 Gram(s) IV Push once  dextrose 50% Injectable 25 Gram(s) IV Push once  erythromycin   Ointment 1 Application(s) Left EYE three times a day  finasteride 5 milliGRAM(s) Oral daily  FIRST- Mouthwash  BLM 10 milliLiter(s) Swish and Spit two times a day  gabapentin 300 milliGRAM(s) Oral two times a day  insulin lispro (HumaLOG) corrective regimen sliding scale   SubCutaneous three times a day before meals  insulin lispro (HumaLOG) corrective regimen sliding scale   SubCutaneous at bedtime  lisinopril 5 milliGRAM(s) Oral daily  metoprolol tartrate 75 milliGRAM(s) Oral at bedtime  metoprolol tartrate 100 milliGRAM(s) Oral daily  pantoprazole    Tablet 40 milliGRAM(s) Oral before breakfast  prednisoLONE acetate 1% Suspension 2 Drop(s) Left EYE four times a day  sodium chloride 1.5%. 500 milliLiter(s) (50 mL/Hr) IV Continuous <Continuous>  Ure-Na 15 Gram(s) 15 Gram(s) Oral two times a day    MEDICATIONS  (PRN):  acetaminophen   Tablet .. 650 milliGRAM(s) Oral every 6 hours PRN Moderate Pain (4 - 6)  dextrose 40% Gel 15 Gram(s) Oral once PRN Blood Glucose LESS THAN 70 milliGRAM(s)/deciliter  glucagon  Injectable 1 milliGRAM(s) IntraMuscular once PRN Glucose LESS THAN 70 milligrams/deciliter  guaiFENesin   Syrup  (Sugar-Free) 100 milliGRAM(s) Oral every 6 hours PRN Cough  melatonin 3 milliGRAM(s) Oral at bedtime PRN Insomnia  oxyCODONE    IR 5 milliGRAM(s) Oral every 12 hours PRN Severe Pain (7 - 10)      Vital Signs Last 24 Hrs  T(C): 36.6 (11 Nov 2019 05:11), Max: 36.7 (11 Nov 2019 02:28)  T(F): 97.8 (11 Nov 2019 05:11), Max: 98 (11 Nov 2019 02:28)  HR: 76 (11 Nov 2019 05:11) (74 - 86)  BP: 151/69 (11 Nov 2019 05:11) (136/82 - 162/74)  BP(mean): --  RR: 18 (11 Nov 2019 05:11) (18 - 18)  SpO2: 97% (11 Nov 2019 05:11) (94% - 98%)  CAPILLARY BLOOD GLUCOSE      POCT Blood Glucose.: 180 mg/dL (11 Nov 2019 08:25)  POCT Blood Glucose.: 186 mg/dL (10 Nov 2019 22:30)  POCT Blood Glucose.: 173 mg/dL (10 Nov 2019 16:51)  POCT Blood Glucose.: 210 mg/dL (10 Nov 2019 12:19)    I&O's Summary    10 Nov 2019 07:01  -  11 Nov 2019 07:00  --------------------------------------------------------  IN: 840 mL / OUT: 850 mL / NET: -10 mL        PHYSICAL EXAM:  HEAD:  Atraumatic, Normocephalic  NECK: Supple, No   JVD  CHEST/LUNG:   no     rales,     no,    rhonchi  HEART: Regular rate and rhythm;         murmur  ABDOMEN: Soft, Nontender, ;   EXTREMITIES:    no    edema  NEUROLOGY:  alert    crusted lesions face    LABS:                        9.1    2.32  )-----------( 133      ( 09 Nov 2019 10:43 )             24.8     11-11    128<L>  |  89<L>  |  16  ----------------------------<  194<H>  4.2   |  27  |  0.82    Ca    8.7      11 Nov 2019 07:16                    Hemoglobin A1C, Whole Blood: 7.3 % (11-06 @ 09:05)    Thyroid Stimulating Hormone, Serum: 1.25 uIU/mL (11-06 @ 08:51)          Consultant(s) Notes Reviewed:      Care Discussed with Consultants/Other Providers:

## 2019-11-11 NOTE — PROGRESS NOTE ADULT - SUBJECTIVE AND OBJECTIVE BOX
GASTROENTEROLOGY    Patient seen and examined. He is feeling better today  No acute overnight events noted          MEDICATIONS  (STANDING):  acyclovir IVPB 750 milliGRAM(s) IV Intermittent every 8 hours  apixaban 5 milliGRAM(s) Oral two times a day  artificial tears (preservative free) Ophthalmic Solution 1 Drop(s) Left EYE every 2 hours  cyclopentolate 1% Solution 1 Drop(s) Left EYE three times a day  dextrose 5%. 1000 milliLiter(s) (50 mL/Hr) IV Continuous <Continuous>  dextrose 50% Injectable 12.5 Gram(s) IV Push once  dextrose 50% Injectable 25 Gram(s) IV Push once  dextrose 50% Injectable 25 Gram(s) IV Push once  erythromycin   Ointment 1 Application(s) Left EYE three times a day  finasteride 5 milliGRAM(s) Oral daily  FIRST- Mouthwash  BLM 10 milliLiter(s) Swish and Spit two times a day  insulin lispro (HumaLOG) corrective regimen sliding scale   SubCutaneous three times a day before meals  insulin lispro (HumaLOG) corrective regimen sliding scale   SubCutaneous at bedtime  lisinopril 5 milliGRAM(s) Oral daily  metoprolol tartrate 100 milliGRAM(s) Oral daily  metoprolol tartrate 50 milliGRAM(s) Oral daily  pantoprazole    Tablet 40 milliGRAM(s) Oral before breakfast  prednisoLONE acetate 1% Suspension 2 Drop(s) Left EYE four times a day  Ure-Na 15 Gram(s) 15 Gram(s) Oral two times a day        T(F): 98.1 (11-10-19 @ 04:21), Max: 98.1 (11-09-19 @ 21:58)  HR: 79 (11-10-19 @ 04:21) (79 - 88)  BP: 155/88 (11-10-19 @ 04:21) (126/65 - 155/88)  RR: 19 (11-10-19 @ 04:21) (18 - 19)  SpO2: 96% (11-10-19 @ 04:21) (95% - 96%)  Wt(kg): --    PHYSICAL EXAM  GENERAL:   NAD  HEENT:  crusted zoster lesions  CHEST:  clear to ascultation bilaterally, respirations nonlabored  HEART:  +S1+S2 regular rate and rhythm   ABDOMEN:  Soft, non-tender, non-distended, + bowel sounds  EXTREMITIES:  no cyanosis, clubbing or edema  NEURO:  Alert, oriented  SKIN:  scattered zoster lesions      LABS:                        9.1<L>  2.32<L> )-----------( 133<L>    ( 09 Nov 2019 10:43 )             24.8<L>  09 Nov 2019 10:43    11-09    127<L>  |  90<L>  |  10  ----------------------------<  173<H>  3.8   |  25  |  0.77    Ca    8.3<L>      09 Nov 2019 05:51          I&O's Detail    09 Nov 2019 07:01  -  10 Nov 2019 07:00  --------------------------------------------------------  IN:    Oral Fluid: 420 mL  Total IN: 420 mL    OUT:    Voided: 1625 mL  Total OUT: 1625 mL    Total NET: -1205 mL

## 2019-11-11 NOTE — PROGRESS NOTE ADULT - SUBJECTIVE AND OBJECTIVE BOX
[facility] [place] [facility]   MD Alnodra Portillo D.O. Fatima Sheikh, D.O. Ruoru Wong, PA    From 7 AM - 5 PM:  OFFICE: [phone]  Dr. Oquendo cell: [phone]  Dr. Noland cell: [phone]  Dr. Desouza cell: [phone]  CORETTA Cano cell: [phone]    From 5 PM - 7 AM: Answering Service: [phone]        RENAL FOLLOW UP NOTE  --------------------------------------------------------------------------------  HPI: Pt seen and examined. Has no complaints today. Per wife at bedside, states he has not been eating well. Has throat discomfort. She is giving him pureed food and glucerna shakes which he has been tolerating well.        PAST HISTORY  --------------------------------------------------------------------------------  No significant changes to PMH, PSH, FHx, SHx, unless otherwise noted    ALLERGIES & MEDICATIONS  --------------------------------------------------------------------------------  Allergies    No Known Allergies    Intolerances      Standing Inpatient Medications  apixaban 5 milliGRAM(s) Oral two times a day  artificial tears (preservative free) Ophthalmic Solution 1 Drop(s) Left EYE every 2 hours  cyclopentolate 1% Solution 1 Drop(s) Left EYE three times a day  dextrose 5%. 1000 milliLiter(s) IV Continuous <Continuous>  dextrose 50% Injectable 12.5 Gram(s) IV Push once  dextrose 50% Injectable 25 Gram(s) IV Push once  dextrose 50% Injectable 25 Gram(s) IV Push once  erythromycin   Ointment 1 Application(s) Left EYE three times a day  finasteride 5 milliGRAM(s) Oral daily  FIRST- Mouthwash  BLM 10 milliLiter(s) Swish and Spit two times a day  gabapentin 300 milliGRAM(s) Oral two times a day  insulin lispro (HumaLOG) corrective regimen sliding scale   SubCutaneous three times a day before meals  insulin lispro (HumaLOG) corrective regimen sliding scale   SubCutaneous at bedtime  lisinopril 5 milliGRAM(s) Oral daily  metoprolol tartrate 75 milliGRAM(s) Oral at bedtime  metoprolol tartrate 100 milliGRAM(s) Oral daily  pantoprazole    Tablet 40 milliGRAM(s) Oral before breakfast  prednisoLONE acetate 1% Suspension 2 Drop(s) Left EYE four times a day  Ure-Na 15 Gram(s) 15 Gram(s) Oral two times a day    PRN Inpatient Medications  dextrose 40% Gel 15 Gram(s) Oral once PRN  glucagon  Injectable 1 milliGRAM(s) IntraMuscular once PRN  melatonin 3 milliGRAM(s) Oral at bedtime PRN  oxyCODONE    IR 5 milliGRAM(s) Oral every 12 hours PRN      REVIEW OF SYSTEMS  --------------------------------------------------------------------------------  General: no fever  CVS: no chest pain  RESP: no sob, no cough  ABD: no abdominal pain  : no dysuria,  MSK: no edema     VITALS/PHYSICAL EXAM  --------------------------------------------------------------------------------  T(C): 36.6 (11-11-19 @ 05:11), Max: 36.7 (11-11-19 @ 02:28)  HR: 76 (11-11-19 @ 05:11) (74 - 86)  BP: 151/69 (11-11-19 @ 05:11) (136/82 - 162/74)  RR: 18 (11-11-19 @ 05:11) (18 - 18)  SpO2: 97% (11-11-19 @ 05:11) (94% - 98%)  Wt(kg): --        11-10-19 @ 07:01  -  11-11-19 @ 07:00  --------------------------------------------------------  IN: 840 mL / OUT: 850 mL / NET: -10 mL      Physical Exam:  	Gen: NAD  	HEENT: MMM  	Pulm: CTA B/L  	CV: S1S2, + murmur  	Abd: Soft, +BS  	Ext: No LE edema B/L              Neuro: Awake   	Skin: Warm and Dry. vesicular rash noted on left side of face in V1 distribution       LABS/STUDIES  --------------------------------------------------------------------------------              9.1    2.32  >-----------<  133      [11-09-19 @ 10:43]              24.8     128  |  89  |  16  ----------------------------<  194      [11-11-19 @ 07:16]  4.2   |  27  |  0.82        Ca     8.7     [11-11-19 @ 07:16]            Creatinine Trend:  SCr 0.82 [11-11 @ 07:16]  SCr 0.82 [11-10 @ 23:47]  SCr 0.84 [11-10 @ 16:08]  SCr 0.77 [11-09 @ 05:51]  SCr 0.85 [11-08 @ 05:57]    Urinalysis - [11-05-19 @ 01:42]      Color Yellow / Appearance Clear / SG 1.020 / pH 5.5      Gluc Negative / Ketone Trace  / Bili Negative / Urobili 2 mg/dL       Blood Negative / Protein Trace / Leuk Est Negative / Nitrite Negative      RBC 2 / WBC 2 / Hyaline 2 / Gran  / Sq Epi  / Non Sq Epi 1 / Bacteria Few    Urine Sodium 59      [11-10-19 @ 18:02]    Iron 51, TIBC 186, %sat 27      [11-07-19 @ 08:57]  Ferritin 776      [11-07-19 @ 08:59]  HbA1c 7.3      [11-06-19 @ 09:05]  TSH 1.25      [11-06-19 @ 08:51]

## 2019-11-11 NOTE — PROGRESS NOTE ADULT - SUBJECTIVE AND OBJECTIVE BOX
[facility] Ophthalmology Follow Up Note    Interval: Pt was examined in the presence of the wife, pt reports feeling subjective weakness and malaise. Pt received ointment and topical drops just prior to our exam .     General: AAO x 3, appropriate mood and affect    Ophthalmology Exam:  Visual acuity (sc): 20/25 OD, 20/70 phni OS  Pupils: OD: reactive, OS: pharmacologic dilation no APD by reverse   Ttono: 9 OD , 10 OS   Extraocular movements (EOMs): Full OU, no pain, no diplopia  Confrontational Visual Field (CVF): Full OU  Color Plates: 12/12 OU    Slit lamp  External: crusting in V1 distribution left side  Lids/Lashes/Lacrimal Ducts: JEAN PIERRE swelling and numerous crusted vesicular lesions in a V1 distribution   Sclera/Conjunctiva: W+Q OD, OS w/ chemosis 360 and mild injection, ointment in the left eye   Cornea: Cl OD, OS w/ 1+ SPK diffusely, no pseudodendrites  Anterior Chamber: D+Q OD, OS trace cell/flare  Iris: Flat OU  Lens: PCIOL OU    Fundus Exam: dilated with 1% tropicamide and 2.5% phenylephrine  Approval obtained from primary team for dilation  Patient aware that pupils can remained dilated for at least 4-6 hours  Exam performed with 20D lens    Vitreous: wnl OS  Disc, cup/disc: sharp and pink, 0.35 OS  Macula: RPE Changes OS   Vessels: wnl OS  Periphery: wnl OS      Assessment and Recommendation:     84y male w/ shingles OS V1 distribution admitted for management of multiple medical problems. 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 The pt demonstrates BCVA 20/25 OD, 20/70 OS, IOP wnl OU, CVF, EOM, color vision full OU. Today the anterior slit lamp exam was wnl OD, the OS demonstrated w/ JEAN PIERRE swelling, SPK, trace cell/flare, no pseudodendrites OS. Fundus exam unremarkable. Poor VA OS at near is secondary to pharmacologic dilation and prescribed ointment still in the eye.     - Antiviral therapy as per primary team, use erythromycin ophthalmic ointment TID to periocular lesions  - for conjunctival involvement: cool compresses and erythromycin ointment to the left eye TID  - for corneal involvement with SPK: preservative free artificial tears every 1-2 hours to the left eye  - discontinue cyclopentolate drops TID to the left eye and continue pred forte drops QID to the left eye  - we will continue to follow with his care  - findings and plan discussed with patient and primary team    Outpatient follow-up: Patient should follow-up with his/her ophthalmologist or with [facility] Department of Ophthalmology next day after discharge if still undergoing treatment for zoster at:    [address]  [place], [place] [id number] [phone] [facility] Ophthalmology Follow Up Note    Interval: Pt was examined in the presence of the wife, pt reports feeling subjective weakness and malaise. Pt received ointment and topical drops just prior to our exam .     General: AAO x 3, appropriate mood and affect    Ophthalmology Exam:  Visual acuity (sc): 20/25 OD, 20/100 phni OS  Pupils: OD: reactive, OS: pharmacologic dilation no APD by reverse   Ttono: 9 OD , 10 OS   Extraocular movements (EOMs): Full OU, no pain, no diplopia  Confrontational Visual Field (CVF): Full OU  Color Plates: 12/12 OU    Slit lamp  External: crusting in V1 distribution left side  Lids/Lashes/Lacrimal Ducts: 1+ JEAN PIERRE edema and numerous crusted vesicular lesions in a V1 distribution   Sclera/Conjunctiva: W+Q OD, OS w/ chemosis 360 and 1+ injection (ointment in the left eye)   Cornea: Cl OD, OS w/ 1+ SPK diffusely, no pseudodendrites  Anterior Chamber: D+Q OD, OS trace cell/flare  Iris: Flat OU  Lens: PCIOL OU    Fundus Exam: dilated with 1% tropicamide and 2.5% phenylephrine  Approval obtained from primary team for dilation  Patient aware that pupils can remained dilated for at least 4-6 hours  Exam performed with 20D lens    Vitreous: wnl OS  Disc, cup/disc: sharp and pink, 0.35 OS  Macula: RPE Changes OS   Vessels: wnl OS  Periphery: wnl OS, no retinal whitening, no hemorrhages OS      Assessment and Recommendation:     84y male w/ shingles OS V1 distribution admitted for management of multiple medical problems. 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 Today the anterior slit lamp exam was wnl OD, the OS demonstrated w/ JEAN PIERRE swelling, SPK, trace cell/flare, no pseudodendrites OS. Fundus exam unremarkable. Poor VA OS at near is likely secondary to pharmacologic dilation and prescribed ointment still in the left eye.     - Antiviral therapy as per primary team, use erythromycin ophthalmic ointment TID to periocular lesions  - for conjunctival involvement: cool compresses and erythromycin ointment to the left eye TID  - for corneal involvement with SPK: preservative free artificial tears QID to the left eye  - discontinue cyclopentolate drops TID to the left eye and continue pred forte drops QID to the left eye  - we will continue to follow with his care  - findings and plan discussed with patient, wife, and primary team  - explained to patient and wife that long term use of steroids in the eye, especially when not monitored by an ophthalmologist, can cause high pressures in the eye and if not controlled, can lead to permanent vision loss.  They both understand the importance of follow up with an ophthalmologist within 1 week after discharge, sooner if symptoms worsen or change.    [address]  [place], [place] [id number] [phone]

## 2019-11-11 NOTE — PROGRESS NOTE ADULT - ASSESSMENT
84  yr  w/        HTN,   HLD,    DM (glipizide),   CKD. , CAD  on ct, . AF  on eliquis,  diastolic  chf       marginal  zone lymphoma ,    s/p chemo by dr brown,  july/ 2019/   none  recently,  , not on chemo at present   .  h/o  anemia/ low platelets              admitted  with  left face/  eyelids,  h  zoster/  with crusted lesions,  ,     face / V1  distrubution, trigeminal  nerve /  noted  by pt  and  family. around  10/ 22/ 19,  while  in [facility]/    saw   a  dr  on 10/ 24/ 19, in [place]  no lesions,  left  ear  eyes.  no  dendritic  corneal  lesions  per  ophthalmology/ superficial punctate  keratitis  noted  on iv Acyclovir   DM,  follow fs   AF , on   asa/ Eliquis/  lopressor  bid/  lisinopril/ echo  ef  55     ID  eval noted   seen by  opthalmology /  local rx   as  per  ophthalmology   hyponatremia. , stable     blood  c/s,  negative /  rvp, negative  anemia,  seen by gi  afebrile  now/  pancytopenia now/ heme eval  suspect pancytopenia  is  from  viral process/ some of  it is  chronic related  to prior  malignancy  pt has  zoster  for  about   15 days  or  more, pt  not expected to be  contagious  now   maculo papular lesions on extremities, not  typical  appearing  for  h  zoster    spoke  to  wife/   per  PT, needs  rehab  seen by heme/  on procrit   hyponatremia.  improving  on   ure Na.       stop  iv Acyclovir /  duration of  po,  defer to  ID   d/c  to  rehab  today         < from: CT Chest No Cont (06.02.19 @ 20:18) >  IMPRESSION:Small bilateral pleural effusions, left greater than right.  Interlobular septal thickening representing pulmonary edema.  Small pericarial effusion.  Coronary artery atherosclerotic disease.  < end of copied text > 84  yr  w/        HTN,   HLD,    DM (glipizide),   CKD. , CAD  on ct, . AF  on eliquis,  diastolic  chf       marginal  zone lymphoma ,    s/p chemo by dr brown,  july/ 2019/   none  recently,  , not on chemo at present   .  h/o  anemia/ low platelets              admitted  with  left face/  eyelids,  h  zoster/  with crusted lesions,  ,     face / V1  distrubution, trigeminal  nerve /  noted  by pt  and  family. around  10/ 22/ 19,  while  in [facility]/    saw   a  dr  on 10/ 24/ 19, in [place]  no lesions,  left  ear  eyes.  no  dendritic  corneal  lesions  per  ophthalmology/ superficial punctate  keratitis  noted  on iv Acyclovir   DM,  follow fs   AF , on   asa/ Eliquis/  lopressor  bid/  lisinopril/ echo  ef  56     ID  eval noted   seen by  opthalmology /  local rx   as  per  ophthalmology   hyponatremia. , stable     blood  c/s,  negative /  rvp, negative  anemia,  seen by gi  afebrile  now/  pancytopenia now/ heme eval  suspect pancytopenia  is  from  viral process/ some of  it is  chronic related  to prior  malignancy  pt has  zoster  for  about   15 days  or  more, pt  not expected to be  contagious  now   maculo papular lesions on extremities, not  typical  appearing  for  h  zoster    spoke  to  wife/   per  PT, needs  rehab  seen by heme/  on procrit   hyponatremia.  improving  on   ure Na.       stop  iv Acyclovir /  discussed  with  ID  d/c  to  rehab  today         < from: CT Chest No Cont (06.02.19 @ 20:18) >  IMPRESSION:Small bilateral pleural effusions, left greater than right.  Interlobular septal thickening representing pulmonary edema.  Small pericarial effusion.  Coronary artery atherosclerotic disease.  < end of copied text >

## 2019-11-11 NOTE — PROGRESS NOTE ADULT - SUBJECTIVE AND OBJECTIVE BOX
Patient came to hospital straight from airport landing from [facility] with tiredness, fatigue, rash across his L face/eyefor 3 days, told to have shingles  Patient has past history of A Fib, CHF, DM, HTN, HLD,and marginal zone lymphoma , s/p 4 cycles of chemo in mid 2019.  Had a 102.9 fever, was given fluids, a dose of zosyn and started on IV ACV. optho has seen, no deep ocular involvement. He is alert, denies any headache, is able to follow commands. He has about 20 scattered lesions over body, trunk amd all 4 extremities. His left facial lesions have crusted.  Hem consult because of cytopenias, wife stated he gets procrit with his primary hem/onc and also Rituxan every 2 months but was in remission from lymphoma      PAST MEDICAL & SURGICAL HISTORY:  Atrial fibrillation  Anemia  DM2 (diabetes mellitus, type 2)  Osteoporosis  GERD (gastroesophageal reflux disease)  Benign prostatic hypertrophy  HTN (hypertension)  No significant past surgical history    Medications:  apixaban 5 milliGRAM(s) Oral two times a day  artificial tears (preservative free) Ophthalmic Solution 1 Drop(s) Left EYE every 2 hours  cyclopentolate 1% Solution 1 Drop(s) Left EYE three times a day  dextrose 40% Gel 15 Gram(s) Oral once PRN Blood Glucose LESS THAN 70 milliGRAM(s)/deciliter  dextrose 5%. 1000 milliLiter(s) IV Continuous <Continuous>  dextrose 50% Injectable 12.5 Gram(s) IV Push once  dextrose 50% Injectable 25 Gram(s) IV Push once  dextrose 50% Injectable 25 Gram(s) IV Push once  erythromycin   Ointment 1 Application(s) Left EYE three times a day  finasteride 5 milliGRAM(s) Oral daily  FIRST- Mouthwash  BLM 10 milliLiter(s) Swish and Spit two times a day  gabapentin 300 milliGRAM(s) Oral two times a day  glucagon  Injectable 1 milliGRAM(s) IntraMuscular once PRN Glucose LESS THAN 70 milligrams/deciliter  insulin lispro (HumaLOG) corrective regimen sliding scale   SubCutaneous three times a day before meals  insulin lispro (HumaLOG) corrective regimen sliding scale   SubCutaneous at bedtime  lisinopril 5 milliGRAM(s) Oral daily  melatonin 3 milliGRAM(s) Oral at bedtime PRN Insomnia  metoprolol tartrate 75 milliGRAM(s) Oral at bedtime  metoprolol tartrate 100 milliGRAM(s) Oral daily  oxyCODONE    IR 5 milliGRAM(s) Oral every 12 hours PRN Severe Pain (7 - 10)  pantoprazole    Tablet 40 milliGRAM(s) Oral before breakfast  prednisoLONE acetate 1% Suspension 2 Drop(s) Left EYE four times a day  Ure-Na 15 Gram(s) 15 Gram(s) Oral two times a day    Labs:  Complete Blood Count in AM (11.09.19 @ 10:43)    WBC Count: 2.32 K/uL    RBC Count: 2.78 M/uL    Hemoglobin: 9.1 g/dL    Hematocrit: 24.8 %    Mean Cell Volume: 89.2 fl    Mean Cell Hemoglobin: 32.7 pg    Mean Cell Hemoglobin Conc: 36.7 gm/dL    Red Cell Distrib Width: 12.9 %    Platelet Count - Automated: 133 K/uL      11-11    128<L>  |  89<L>  |  16  ----------------------------<  194<H>  4.2   |  27  |  0.82    Ca    8.7      11 Nov 2019 07:16        Radiology:             ROS:  Patient comfortable without distress  No SOB or chest pain  No palpitation  No abdominal pain, diarrhaea or constipation  No weakness of extremities  Lesions on face healing  Patient's wife at the bedside worried about the sodium and anemia    Vital Signs Last 24 Hrs  T(C): 36.4 (11 Nov 2019 11:56), Max: 36.7 (11 Nov 2019 02:28)  T(F): 97.5 (11 Nov 2019 11:56), Max: 98 (11 Nov 2019 02:28)  HR: 88 (11 Nov 2019 11:56) (74 - 88)  BP: 154/73 (11 Nov 2019 11:56) (136/82 - 154/73)  BP(mean): --  RR: 18 (11 Nov 2019 11:56) (18 - 18)  SpO2: 97% (11 Nov 2019 11:56) (97% - 98%)    Physical exam:    No distress  CVS: S1, S2 regular or murmur  Chest: bilateral breath sound without rales  Abdomen: soft, not tender, no organomegaly or masses  No focal neuro deficit  No edema.  Herpetic lesions on the face are healing      Assessment and Plan:

## 2019-11-11 NOTE — PROGRESS NOTE ADULT - ATTENDING COMMENTS
I have interviewed and examined the patient and reviewed the residents note including the history, exam, assessment, and plan.  I agree with the residents assessment and plan.    84y male w/ shingles OS V1 distribution admitted for management of multiple medical problems. 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 Today the anterior slit lamp exam was wnl OD, the OS demonstrated w/ JEAN PIERRE swelling, SPK, trace cell/flare, no pseudodendrites OS. Fundus exam unremarkable. Poor VA OS at near is likely secondary to pharmacologic dilation and prescribed ointment still in the left eye.     - Antiviral therapy as per primary team, use erythromycin ophthalmic ointment TID to periocular lesions  - for conjunctival involvement: cool compresses and erythromycin ointment to the left eye TID  - for corneal involvement with SPK: preservative free artificial tears QID to the left eye  - discontinue cyclopentolate drops TID to the left eye and continue pred forte drops QID to the left eye  - we will continue to follow with his care  - findings and plan discussed with patient, wife, and primary team  - explained to patient and wife that long term use of steroids in the eye, especially when not monitored by an ophthalmologist, can cause high pressures in the eye and if not controlled, can lead to permanent vision loss.  They both understand the importance of follow up with an ophthalmologist within 1 week after discharge, sooner if symptoms worsen or change.    Mariza Soler MD

## 2019-11-11 NOTE — PROGRESS NOTE ADULT - PROBLEM SELECTOR PLAN 1
monitor H/H daily, transfuse prn  hgb relatively stable over the past few days  no overt signs of GIB   Anemia workup reviewed   heme/onc following  cont PPI PO for GI ppx   No GI contraindication to continue AC   monitor stool color closely while on AC   o/p f/u scheduled with Dr. 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

## 2019-11-12 LAB
ANION GAP SERPL CALC-SCNC: 9 MMOL/L — SIGNIFICANT CHANGE UP (ref 5–17)
BUN SERPL-MCNC: 21 MG/DL — SIGNIFICANT CHANGE UP (ref 7–23)
CALCIUM SERPL-MCNC: 9.2 MG/DL — SIGNIFICANT CHANGE UP (ref 8.4–10.5)
CHLORIDE SERPL-SCNC: 90 MMOL/L — LOW (ref 96–108)
CO2 SERPL-SCNC: 28 MMOL/L — SIGNIFICANT CHANGE UP (ref 22–31)
CREAT SERPL-MCNC: 0.9 MG/DL — SIGNIFICANT CHANGE UP (ref 0.5–1.3)
GLUCOSE BLDC GLUCOMTR-MCNC: 178 MG/DL — HIGH (ref 70–99)
GLUCOSE BLDC GLUCOMTR-MCNC: 217 MG/DL — HIGH (ref 70–99)
GLUCOSE BLDC GLUCOMTR-MCNC: 219 MG/DL — HIGH (ref 70–99)
GLUCOSE BLDC GLUCOMTR-MCNC: 240 MG/DL — HIGH (ref 70–99)
GLUCOSE SERPL-MCNC: 184 MG/DL — HIGH (ref 70–99)
POTASSIUM SERPL-MCNC: 4 MMOL/L — SIGNIFICANT CHANGE UP (ref 3.5–5.3)
POTASSIUM SERPL-SCNC: 4 MMOL/L — SIGNIFICANT CHANGE UP (ref 3.5–5.3)
SODIUM SERPL-SCNC: 127 MMOL/L — LOW (ref 135–145)

## 2019-11-12 RX ORDER — ACETAMINOPHEN 500 MG
650 TABLET ORAL ONCE
Refills: 0 | Status: COMPLETED | OUTPATIENT
Start: 2019-11-12 | End: 2019-11-12

## 2019-11-12 RX ADMIN — Medication 1 DROP(S): at 12:18

## 2019-11-12 RX ADMIN — Medication 75 MILLIGRAM(S): at 21:11

## 2019-11-12 RX ADMIN — Medication 100 MILLIGRAM(S): at 05:30

## 2019-11-12 RX ADMIN — PANTOPRAZOLE SODIUM 40 MILLIGRAM(S): 20 TABLET, DELAYED RELEASE ORAL at 07:02

## 2019-11-12 RX ADMIN — Medication 1 DROP(S): at 21:12

## 2019-11-12 RX ADMIN — Medication 1 DROP(S): at 12:19

## 2019-11-12 RX ADMIN — DIPHENHYDRAMINE HYDROCHLORIDE AND LIDOCAINE HYDROCHLORIDE AND ALUMINUM HYDROXIDE AND MAGNESIUM HYDRO 10 MILLILITER(S): KIT at 05:29

## 2019-11-12 RX ADMIN — Medication 1: at 08:12

## 2019-11-12 RX ADMIN — Medication 2: at 17:26

## 2019-11-12 RX ADMIN — GABAPENTIN 300 MILLIGRAM(S): 400 CAPSULE ORAL at 05:29

## 2019-11-12 RX ADMIN — Medication 1 DROP(S): at 07:02

## 2019-11-12 RX ADMIN — Medication 100 MILLIGRAM(S): at 21:11

## 2019-11-12 RX ADMIN — LISINOPRIL 5 MILLIGRAM(S): 2.5 TABLET ORAL at 05:30

## 2019-11-12 RX ADMIN — FINASTERIDE 5 MILLIGRAM(S): 5 TABLET, FILM COATED ORAL at 12:18

## 2019-11-12 RX ADMIN — Medication 1 DROP(S): at 17:26

## 2019-11-12 RX ADMIN — Medication 3 MILLIGRAM(S): at 21:13

## 2019-11-12 RX ADMIN — Medication 2 DROP(S): at 21:55

## 2019-11-12 RX ADMIN — APIXABAN 5 MILLIGRAM(S): 2.5 TABLET, FILM COATED ORAL at 17:26

## 2019-11-12 RX ADMIN — APIXABAN 5 MILLIGRAM(S): 2.5 TABLET, FILM COATED ORAL at 05:29

## 2019-11-12 RX ADMIN — Medication 650 MILLIGRAM(S): at 21:12

## 2019-11-12 RX ADMIN — Medication 1 DROP(S): at 09:50

## 2019-11-12 RX ADMIN — DIPHENHYDRAMINE HYDROCHLORIDE AND LIDOCAINE HYDROCHLORIDE AND ALUMINUM HYDROXIDE AND MAGNESIUM HYDRO 10 MILLILITER(S): KIT at 17:26

## 2019-11-12 RX ADMIN — Medication 2 DROP(S): at 12:18

## 2019-11-12 RX ADMIN — Medication 100 MILLIGRAM(S): at 05:28

## 2019-11-12 RX ADMIN — Medication 1 APPLICATION(S): at 12:19

## 2019-11-12 RX ADMIN — Medication 1 DROP(S): at 05:29

## 2019-11-12 RX ADMIN — Medication 1 APPLICATION(S): at 05:28

## 2019-11-12 RX ADMIN — Medication 1 APPLICATION(S): at 21:11

## 2019-11-12 RX ADMIN — Medication 2: at 12:17

## 2019-11-12 RX ADMIN — GABAPENTIN 300 MILLIGRAM(S): 400 CAPSULE ORAL at 17:26

## 2019-11-12 RX ADMIN — Medication 2 DROP(S): at 17:27

## 2019-11-12 RX ADMIN — Medication 2 DROP(S): at 05:30

## 2019-11-12 NOTE — PROGRESS NOTE ADULT - SUBJECTIVE AND OBJECTIVE BOX
no  complaints    REVIEW OF SYSTEMS:  GEN: no fever,    no chills  RESP: no SOB,   no cough  CVS: no chest pain,   no palpitations  GI: no abdominal pain,   no nausea,   no vomiting,   no constipation,   no diarrhea  : no dysuria,   no frequency  NEURO: no headache,   no dizziness  PSYCH: no depression,   not anxious  Derm : no rash    MEDICATIONS  (STANDING):  apixaban 5 milliGRAM(s) Oral two times a day  artificial tears (preservative free) Ophthalmic Solution 1 Drop(s) Left EYE every 2 hours  dextrose 5%. 1000 milliLiter(s) (50 mL/Hr) IV Continuous <Continuous>  dextrose 50% Injectable 12.5 Gram(s) IV Push once  dextrose 50% Injectable 25 Gram(s) IV Push once  dextrose 50% Injectable 25 Gram(s) IV Push once  erythromycin   Ointment 1 Application(s) Left EYE three times a day  finasteride 5 milliGRAM(s) Oral daily  FIRST- Mouthwash  BLM 10 milliLiter(s) Swish and Spit two times a day  gabapentin 300 milliGRAM(s) Oral two times a day  insulin lispro (HumaLOG) corrective regimen sliding scale   SubCutaneous three times a day before meals  insulin lispro (HumaLOG) corrective regimen sliding scale   SubCutaneous at bedtime  lisinopril 5 milliGRAM(s) Oral daily  metoprolol tartrate 75 milliGRAM(s) Oral at bedtime  metoprolol tartrate 100 milliGRAM(s) Oral daily  pantoprazole    Tablet 40 milliGRAM(s) Oral before breakfast  prednisoLONE acetate 1% Suspension 2 Drop(s) Left EYE four times a day  Ure-Na 15 Gram(s) 15 Gram(s) Oral two times a day    MEDICATIONS  (PRN):  dextrose 40% Gel 15 Gram(s) Oral once PRN Blood Glucose LESS THAN 70 milliGRAM(s)/deciliter  glucagon  Injectable 1 milliGRAM(s) IntraMuscular once PRN Glucose LESS THAN 70 milligrams/deciliter  melatonin 3 milliGRAM(s) Oral at bedtime PRN Insomnia  oxyCODONE    IR 5 milliGRAM(s) Oral every 12 hours PRN Severe Pain (7 - 10)      Vital Signs Last 24 Hrs  T(C): 36.6 (12 Nov 2019 04:03), Max: 37.7 (11 Nov 2019 21:21)  T(F): 97.8 (12 Nov 2019 04:03), Max: 99.8 (11 Nov 2019 21:21)  HR: 78 (12 Nov 2019 04:03) (73 - 99)  BP: 142/72 (12 Nov 2019 04:03) (142/72 - 165/72)  BP(mean): --  RR: 18 (12 Nov 2019 04:03) (18 - 18)  SpO2: 96% (12 Nov 2019 04:03) (96% - 97%)  CAPILLARY BLOOD GLUCOSE      POCT Blood Glucose.: 178 mg/dL (12 Nov 2019 07:40)  POCT Blood Glucose.: 225 mg/dL (11 Nov 2019 21:28)  POCT Blood Glucose.: 182 mg/dL (11 Nov 2019 16:49)  POCT Blood Glucose.: 188 mg/dL (11 Nov 2019 12:30)    I&O's Summary    11 Nov 2019 07:01  -  12 Nov 2019 07:00  --------------------------------------------------------  IN: 360 mL / OUT: 1850 mL / NET: -1490 mL        PHYSICAL EXAM:  HEAD:  Atraumatic, Normocephalic  NECK: Supple, No   JVD  CHEST/LUNG:   no     rales,     no,    rhonchi  HEART: Regular rate and rhythm;         murmur  ABDOMEN: Soft, Nontender, ;   EXTREMITIES:    no edema  NEUROLOGY:  alert    LABS:    11-12    127<L>  |  90<L>  |  21  ----------------------------<  184<H>  4.0   |  28  |  0.90    Ca    9.2      12 Nov 2019 05:38                    Hemoglobin A1C, Whole Blood: 7.3 % (11-06 @ 09:05)    Thyroid Stimulating Hormone, Serum: 1.25 uIU/mL (11-06 @ 08:51)          Consultant(s) Notes Reviewed:      Care Discussed with Consultants/Other Providers:

## 2019-11-12 NOTE — PROGRESS NOTE ADULT - SUBJECTIVE AND OBJECTIVE BOX
CARDIOLOGY     PROGRESS  NOTE   ________________________________________________    CHIEF COMPLAINT:Patient is a 84y old  Male who presents with a chief complaint of zoster (11 Nov 2019 19:16)  doing better.  	  REVIEW OF SYSTEMS:  CONSTITUTIONAL: No fever, weight loss, or fatigue  EYES: No eye pain, visual disturbances, or discharge  ENT:  No difficulty hearing, tinnitus, vertigo; No sinus or throat pain  NECK: No pain or stiffness  RESPIRATORY: No cough, wheezing, chills or hemoptysis; No Shortness of Breath  CARDIOVASCULAR: No chest pain, palpitations, passing out, dizziness, or leg swelling  GASTROINTESTINAL: No abdominal or epigastric pain. No nausea, vomiting, or hematemesis; No diarrhea or constipation. No melena or hematochezia.  GENITOURINARY: No dysuria, frequency, hematuria, or incontinence  NEUROLOGICAL: No headaches, memory loss, loss of strength, numbness, or tremors  SKIN: No itching, burning, rashes, or lesions   LYMPH Nodes: No enlarged glands  ENDOCRINE: No heat or cold intolerance; No hair loss  MUSCULOSKELETAL: No joint pain or swelling; No muscle, back, or extremity pain  PSYCHIATRIC: No depression, anxiety, mood swings, or difficulty sleeping  HEME/LYMPH: No easy bruising, or bleeding gums  ALLERGY AND IMMUNOLOGIC: No hives or eczema	    [ ] All others negative	  [ ] Unable to obtain    PHYSICAL EXAM:  T(C): 36.6 (11-12-19 @ 04:03), Max: 37.7 (11-11-19 @ 21:21)  HR: 78 (11-12-19 @ 04:03) (73 - 99)  BP: 142/72 (11-12-19 @ 04:03) (142/72 - 165/72)  RR: 18 (11-12-19 @ 04:03) (18 - 18)  SpO2: 96% (11-12-19 @ 04:03) (96% - 97%)  Wt(kg): --  I&O's Summary    11 Nov 2019 07:01  -  12 Nov 2019 07:00  --------------------------------------------------------  IN: 360 mL / OUT: 1850 mL / NET: -1490 mL        Appearance: Normal	  HEENT:   Normal oral mucosa, PERRL, EOMI	  Lymphatic: No lymphadenopathy  Cardiovascular: Normal S1 S2, No JVD, + murmurs, No edema  Respiratory: Lungs clear to auscultation	  Psychiatry: A & O x 3, Mood & affect appropriate  Gastrointestinal:  Soft, Non-tender, + BS	  Skin: No rashes, No ecchymoses, No cyanosis	  Neurologic: Non-focal  Extremities: Normal range of motion, No clubbing, cyanosis or edema  Vascular: Peripheral pulses palpable 2+ bilaterally    MEDICATIONS  (STANDING):  apixaban 5 milliGRAM(s) Oral two times a day  artificial tears (preservative free) Ophthalmic Solution 1 Drop(s) Left EYE every 2 hours  dextrose 5%. 1000 milliLiter(s) (50 mL/Hr) IV Continuous <Continuous>  dextrose 50% Injectable 12.5 Gram(s) IV Push once  dextrose 50% Injectable 25 Gram(s) IV Push once  dextrose 50% Injectable 25 Gram(s) IV Push once  erythromycin   Ointment 1 Application(s) Left EYE three times a day  finasteride 5 milliGRAM(s) Oral daily  FIRST- Mouthwash  BLM 10 milliLiter(s) Swish and Spit two times a day  gabapentin 300 milliGRAM(s) Oral two times a day  insulin lispro (HumaLOG) corrective regimen sliding scale   SubCutaneous three times a day before meals  insulin lispro (HumaLOG) corrective regimen sliding scale   SubCutaneous at bedtime  lisinopril 5 milliGRAM(s) Oral daily  metoprolol tartrate 75 milliGRAM(s) Oral at bedtime  metoprolol tartrate 100 milliGRAM(s) Oral daily  pantoprazole    Tablet 40 milliGRAM(s) Oral before breakfast  prednisoLONE acetate 1% Suspension 2 Drop(s) Left EYE four times a day  Ure-Na 15 Gram(s) 15 Gram(s) Oral two times a day      TELEMETRY: 	    ECG:  	  RADIOLOGY:  OTHER: 	  	  LABS:	 	    CARDIAC MARKERS:            11-12    127<L>  |  90<L>  |  21  ----------------------------<  184<H>  4.0   |  28  |  0.90    Ca    9.2      12 Nov 2019 05:38      proBNP:   Lipid Profile:   HgA1c: Hemoglobin A1C, Whole Blood: 7.3 % (11-06 @ 09:05)    TSH: Thyroid Stimulating Hormone, Serum: 1.25 uIU/mL (11-06 @ 08:51)          Assessment and plan  ---------------------------  doing better  cardiomyopathy/ a.fib he is well controlled  continue ac  increase ambulation  continue ace inhibitor

## 2019-11-12 NOTE — PROGRESS NOTE ADULT - SUBJECTIVE AND OBJECTIVE BOX
INTERVAL HPI/OVERNIGHT EVENTS:    Patient seen and examined. He is feeling well  he denies abdominal pain, n/v  + brown stool yesterday  await am labs     MEDICATIONS  (STANDING):  apixaban 5 milliGRAM(s) Oral two times a day  artificial tears (preservative free) Ophthalmic Solution 1 Drop(s) Left EYE every 2 hours  dextrose 5%. 1000 milliLiter(s) (50 mL/Hr) IV Continuous <Continuous>  dextrose 50% Injectable 12.5 Gram(s) IV Push once  dextrose 50% Injectable 25 Gram(s) IV Push once  dextrose 50% Injectable 25 Gram(s) IV Push once  erythromycin   Ointment 1 Application(s) Left EYE three times a day  finasteride 5 milliGRAM(s) Oral daily  FIRST- Mouthwash  BLM 10 milliLiter(s) Swish and Spit two times a day  gabapentin 300 milliGRAM(s) Oral two times a day  insulin lispro (HumaLOG) corrective regimen sliding scale   SubCutaneous three times a day before meals  insulin lispro (HumaLOG) corrective regimen sliding scale   SubCutaneous at bedtime  lisinopril 5 milliGRAM(s) Oral daily  metoprolol tartrate 75 milliGRAM(s) Oral at bedtime  metoprolol tartrate 100 milliGRAM(s) Oral daily  pantoprazole    Tablet 40 milliGRAM(s) Oral before breakfast  prednisoLONE acetate 1% Suspension 2 Drop(s) Left EYE four times a day  Ure-Na 15 Gram(s) 15 Gram(s) Oral two times a day    MEDICATIONS  (PRN):  dextrose 40% Gel 15 Gram(s) Oral once PRN Blood Glucose LESS THAN 70 milliGRAM(s)/deciliter  glucagon  Injectable 1 milliGRAM(s) IntraMuscular once PRN Glucose LESS THAN 70 milligrams/deciliter  melatonin 3 milliGRAM(s) Oral at bedtime PRN Insomnia  oxyCODONE    IR 5 milliGRAM(s) Oral every 12 hours PRN Severe Pain (7 - 10)      Allergies    No Known Allergies    Intolerances        Review of Systems:    General:  No wt loss, fevers, chills, night sweats,fatigue,   Eyes:  Good vision, no reported pain  ENT:  No sore throat, pain, runny nose, dysphagia  CV:  No pain, palpitations, hypo/hypertension  Resp:  No dyspnea, cough, tachypnea, wheezing  GI:  No pain, No nausea, No vomiting, No diarrhea, No constipation, No weight loss, No fever, No pruritis, No rectal bleeding, No melena, No dysphagia  :  No pain, bleeding, incontinence, nocturia  Muscle:  No pain, weakness  Neuro:  No weakness, tingling, memory problems  Psych:  No fatigue, insomnia, mood problems, depression  Endocrine:  No polyuria, polydypsia, cold/heat intolerance  Heme:  No petechiae, ecchymosis, easy bruisability  Skin:  No rash, tattoos, scars, edema      Vital Signs Last 24 Hrs  T(C): 37.4 (12 Nov 2019 11:13), Max: 37.7 (11 Nov 2019 21:21)  T(F): 99.3 (12 Nov 2019 11:13), Max: 99.8 (11 Nov 2019 21:21)  HR: 81 (12 Nov 2019 11:13) (73 - 99)  BP: 145/80 (12 Nov 2019 11:13) (142/72 - 165/72)  BP(mean): --  RR: 18 (12 Nov 2019 11:13) (18 - 18)  SpO2: 96% (12 Nov 2019 11:13) (96% - 96%)    PHYSICAL EXAM:    Constitutional: NAD, well-developed  HEENT: EOMI, throat clear  Neck: No LAD, supple  Respiratory: CTA and P  Cardiovascular: S1 and S2, RRR, no M  Gastrointestinal: BS+, soft, NT/ND, neg HSM,  Extremities: No peripheral edema, neg clubing, cyanosis  Vascular: 2+ peripheral pulses  Neurological: A/O x 3, no focal deficits  Psychiatric: Normal mood, normal affect  Skin: No rashes      LABS:    11-12    127<L>  |  90<L>  |  21  ----------------------------<  184<H>  4.0   |  28  |  0.90    Ca    9.2      12 Nov 2019 05:38            RADIOLOGY & ADDITIONAL TESTS:

## 2019-11-12 NOTE — PROGRESS NOTE ADULT - SUBJECTIVE AND OBJECTIVE BOX
[facility]   MD Alondra Portillo D.O. Fatima Sheikh, D.O. Ruoru Wong, PA    From 7 AM - 5 PM:  OFFICE: [phone]  Dr. Oquendo cell: [phone]  Dr. Noland cell: [phone]  Dr. Desouza cell: [phone]  CORETTA Cano cell: [phone]    From 5 PM - 7 AM: Answering Service: [phone]        RENAL FOLLOW UP NOTE  --------------------------------------------------------------------------------  HPI: Pt seen and examined. Has no complaints today. States he is eating his wife's food. Denies any headache, dizziness.        PAST HISTORY  --------------------------------------------------------------------------------  No significant changes to PMH, PSH, FHx, SHx, unless otherwise noted    ALLERGIES & MEDICATIONS  --------------------------------------------------------------------------------  Allergies    No Known Allergies    Intolerances      Standing Inpatient Medications  apixaban 5 milliGRAM(s) Oral two times a day  artificial tears (preservative free) Ophthalmic Solution 1 Drop(s) Left EYE every 2 hours  dextrose 5%. 1000 milliLiter(s) IV Continuous <Continuous>  dextrose 50% Injectable 12.5 Gram(s) IV Push once  dextrose 50% Injectable 25 Gram(s) IV Push once  dextrose 50% Injectable 25 Gram(s) IV Push once  erythromycin   Ointment 1 Application(s) Left EYE three times a day  finasteride 5 milliGRAM(s) Oral daily  FIRST- Mouthwash  BLM 10 milliLiter(s) Swish and Spit two times a day  gabapentin 300 milliGRAM(s) Oral two times a day  insulin lispro (HumaLOG) corrective regimen sliding scale   SubCutaneous three times a day before meals  insulin lispro (HumaLOG) corrective regimen sliding scale   SubCutaneous at bedtime  lisinopril 5 milliGRAM(s) Oral daily  metoprolol tartrate 75 milliGRAM(s) Oral at bedtime  metoprolol tartrate 100 milliGRAM(s) Oral daily  pantoprazole    Tablet 40 milliGRAM(s) Oral before breakfast  prednisoLONE acetate 1% Suspension 2 Drop(s) Left EYE four times a day  Ure-Na 15 Gram(s) 15 Gram(s) Oral two times a day    PRN Inpatient Medications  dextrose 40% Gel 15 Gram(s) Oral once PRN  glucagon  Injectable 1 milliGRAM(s) IntraMuscular once PRN  melatonin 3 milliGRAM(s) Oral at bedtime PRN  oxyCODONE    IR 5 milliGRAM(s) Oral every 12 hours PRN      REVIEW OF SYSTEMS  --------------------------------------------------------------------------------  General: no fever  CVS: no chest pain  RESP: no sob, no cough  ABD: no abdominal pain  : no dysuria,  MSK: no edema     VITALS/PHYSICAL EXAM  --------------------------------------------------------------------------------  T(C): 36.6 (11-12-19 @ 04:03), Max: 37.7 (11-11-19 @ 21:21)  HR: 78 (11-12-19 @ 04:03) (73 - 99)  BP: 142/72 (11-12-19 @ 04:03) (142/72 - 165/72)  RR: 18 (11-12-19 @ 04:03) (18 - 18)  SpO2: 96% (11-12-19 @ 04:03) (96% - 97%)  Wt(kg): --        11-11-19 @ 07:01  -  11-12-19 @ 07:00  --------------------------------------------------------  IN: 360 mL / OUT: 1850 mL / NET: -1490 mL    11-12-19 @ 07:01  -  11-12-19 @ 09:55  --------------------------------------------------------  IN: 240 mL / OUT: 0 mL / NET: 240 mL      Physical Exam:  	Gen: NAD  	HEENT: MMM  	Pulm: CTA B/L  	CV: S1S2, + murmur  	Abd: Soft, +BS  	Ext: No LE edema B/L              Neuro: Awake   	Skin: Warm and Dry. Crusting vesicular rash noted on left side of face in V1 distribution   	    LABS/STUDIES  --------------------------------------------------------------------------------    127  |  90  |  21  ----------------------------<  184      [11-12-19 @ 05:38]  4.0   |  28  |  0.90        Ca     9.2     [11-12-19 @ 05:38]          Serum Osmolality 273      [11-11-19 @ 09:40]    Creatinine Trend:  SCr 0.90 [11-12 @ 05:38]  SCr 0.82 [11-11 @ 07:16]  SCr 0.82 [11-10 @ 23:47]  SCr 0.84 [11-10 @ 16:08]  SCr 0.77 [11-09 @ 05:51]    Urinalysis - [11-05-19 @ 01:42]      Color Yellow / Appearance Clear / SG 1.020 / pH 5.5      Gluc Negative / Ketone Trace  / Bili Negative / Urobili 2 mg/dL       Blood Negative / Protein Trace / Leuk Est Negative / Nitrite Negative      RBC 2 / WBC 2 / Hyaline 2 / Gran  / Sq Epi  / Non Sq Epi 1 / Bacteria Few    Urine Sodium 59      [11-10-19 @ 18:02]  Urine Osmolality 368      [11-10-19 @ 21:43]    Iron 51, TIBC 186, %sat 27      [11-07-19 @ 08:57]  Ferritin 776      [11-07-19 @ 08:59]  HbA1c 7.3      [11-06-19 @ 09:05]  TSH 1.25      [11-06-19 @ 08:51]

## 2019-11-12 NOTE — PROGRESS NOTE ADULT - PROBLEM SELECTOR PLAN 1
monitor H/H daily, transfuse prn  hgb stable over the past few days  no overt signs of GIB   Anemia workup reviewed   heme/onc following  cont PPI PO for GI ppx   No GI contraindication to continue AC   monitor stool color closely while on AC   o/p f/u scheduled with Dr. 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

## 2019-11-12 NOTE — PROGRESS NOTE ADULT - ASSESSMENT
84  yr  w/ HTN,   HLD,    DM (glipizide),   CKD. , CAD  on ct, . AF  on eliquis,  diastolic  chf, marginal  zone lymphoma , s/p chemo by dr brown,  july/ 2019/ not on chemo at present h/o anemia/ low platelets admitted with herpes zoster. 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

## 2019-11-12 NOTE — PROGRESS NOTE ADULT - ASSESSMENT
84  yr  w/        HTN,   HLD,    DM (glipizide),   CKD. , CAD  on ct, . AF  on eliquis,  diastolic  chf       marginal  zone lymphoma ,    s/p chemo by dr brown,  july/ 2019/   none  recently,  , not on chemo at present   .  h/o  anemia/ low platelets              admitted  with  left face/  eyelids,  h  zoster/  with crusted lesions,  ,     face / V1  distrubution, trigeminal  nerve /  noted  by pt  and  family. around  10/ 22/ 19,  while  in [facility]/    saw   a  dr  on 10/ 24/ 19, in [place]  no lesions,  left  ear  eyes.  no  dendritic  corneal  lesions  per  ophthalmology/ superficial punctate  keratitis  noted  on iv Acyclovir   DM,  follow fs   AF , on   asa/ Eliquis/  lopressor  bid/  lisinopril/ echo  ef  56     ID  eval noted   seen by  opthalmology /  local rx   as  per  ophthalmology   hyponatremia. , stable     blood  c/s,  negative /  rvp, negative  anemia,  seen by gi  afebrile  now/  pancytopenia now/ heme eval  suspect pancytopenia  is  from  viral process/ some of  it is  chronic related  to prior  malignancy  pt has  zoster  for  about   15 days  or  more, pt  not expected to be  contagious  now   maculo papular lesions on extremities, not  typical  appearing  for  h  zoster    spoke  to  wife/   per  PT, needs  rehab  seen by heme/  on procrit   hyponatremia. ,  on   ure Na.    d/c  to  rehab  today/     rehab  may  follow  bmp there         < from: CT Chest No Cont (06.02.19 @ 20:18) >  IMPRESSION:Small bilateral pleural effusions, left greater than right.  Interlobular septal thickening representing pulmonary edema.  Small pericarial effusion.  Coronary artery atherosclerotic disease.  < end of copied text >

## 2019-11-13 ENCOUNTER — TRANSCRIPTION ENCOUNTER (OUTPATIENT)
Age: 84
End: 2019-11-13

## 2019-11-13 VITALS
SYSTOLIC BLOOD PRESSURE: 139 MMHG | HEART RATE: 94 BPM | DIASTOLIC BLOOD PRESSURE: 66 MMHG | RESPIRATION RATE: 17 BRPM | TEMPERATURE: 98 F | OXYGEN SATURATION: 96 %

## 2019-11-13 LAB
ANION GAP SERPL CALC-SCNC: 11 MMOL/L — SIGNIFICANT CHANGE UP (ref 5–17)
BUN SERPL-MCNC: 22 MG/DL — SIGNIFICANT CHANGE UP (ref 7–23)
CALCIUM SERPL-MCNC: 9 MG/DL — SIGNIFICANT CHANGE UP (ref 8.4–10.5)
CHLORIDE SERPL-SCNC: 94 MMOL/L — LOW (ref 96–108)
CO2 SERPL-SCNC: 26 MMOL/L — SIGNIFICANT CHANGE UP (ref 22–31)
CREAT SERPL-MCNC: 0.86 MG/DL — SIGNIFICANT CHANGE UP (ref 0.5–1.3)
GLUCOSE BLDC GLUCOMTR-MCNC: 183 MG/DL — HIGH (ref 70–99)
GLUCOSE BLDC GLUCOMTR-MCNC: 229 MG/DL — HIGH (ref 70–99)
GLUCOSE BLDC GLUCOMTR-MCNC: 257 MG/DL — HIGH (ref 70–99)
GLUCOSE SERPL-MCNC: 195 MG/DL — HIGH (ref 70–99)
POTASSIUM SERPL-MCNC: 4.3 MMOL/L — SIGNIFICANT CHANGE UP (ref 3.5–5.3)
POTASSIUM SERPL-SCNC: 4.3 MMOL/L — SIGNIFICANT CHANGE UP (ref 3.5–5.3)
SODIUM SERPL-SCNC: 131 MMOL/L — LOW (ref 135–145)

## 2019-11-13 PROCEDURE — 96374 THER/PROPH/DIAG INJ IV PUSH: CPT

## 2019-11-13 PROCEDURE — 85610 PROTHROMBIN TIME: CPT

## 2019-11-13 PROCEDURE — 87486 CHLMYD PNEUM DNA AMP PROBE: CPT

## 2019-11-13 PROCEDURE — 85027 COMPLETE CBC AUTOMATED: CPT

## 2019-11-13 PROCEDURE — 87186 SC STD MICRODIL/AGAR DIL: CPT

## 2019-11-13 PROCEDURE — 87086 URINE CULTURE/COLONY COUNT: CPT

## 2019-11-13 PROCEDURE — 82746 ASSAY OF FOLIC ACID SERUM: CPT

## 2019-11-13 PROCEDURE — 84132 ASSAY OF SERUM POTASSIUM: CPT

## 2019-11-13 PROCEDURE — 82728 ASSAY OF FERRITIN: CPT

## 2019-11-13 PROCEDURE — 87581 M.PNEUMON DNA AMP PROBE: CPT

## 2019-11-13 PROCEDURE — 83935 ASSAY OF URINE OSMOLALITY: CPT

## 2019-11-13 PROCEDURE — 93005 ELECTROCARDIOGRAM TRACING: CPT

## 2019-11-13 PROCEDURE — 97110 THERAPEUTIC EXERCISES: CPT

## 2019-11-13 PROCEDURE — 97116 GAIT TRAINING THERAPY: CPT

## 2019-11-13 PROCEDURE — 97161 PT EVAL LOW COMPLEX 20 MIN: CPT

## 2019-11-13 PROCEDURE — 82330 ASSAY OF CALCIUM: CPT

## 2019-11-13 PROCEDURE — 81001 URINALYSIS AUTO W/SCOPE: CPT

## 2019-11-13 PROCEDURE — 83540 ASSAY OF IRON: CPT

## 2019-11-13 PROCEDURE — 82607 VITAMIN B-12: CPT

## 2019-11-13 PROCEDURE — 83550 IRON BINDING TEST: CPT

## 2019-11-13 PROCEDURE — 71045 X-RAY EXAM CHEST 1 VIEW: CPT

## 2019-11-13 PROCEDURE — 96375 TX/PRO/DX INJ NEW DRUG ADDON: CPT

## 2019-11-13 PROCEDURE — 87040 BLOOD CULTURE FOR BACTERIA: CPT

## 2019-11-13 PROCEDURE — 80053 COMPREHEN METABOLIC PANEL: CPT

## 2019-11-13 PROCEDURE — 82803 BLOOD GASES ANY COMBINATION: CPT

## 2019-11-13 PROCEDURE — 84443 ASSAY THYROID STIM HORMONE: CPT

## 2019-11-13 PROCEDURE — 86787 VARICELLA-ZOSTER ANTIBODY: CPT

## 2019-11-13 PROCEDURE — 84300 ASSAY OF URINE SODIUM: CPT

## 2019-11-13 PROCEDURE — 82962 GLUCOSE BLOOD TEST: CPT

## 2019-11-13 PROCEDURE — 82533 TOTAL CORTISOL: CPT

## 2019-11-13 PROCEDURE — 87798 DETECT AGENT NOS DNA AMP: CPT

## 2019-11-13 PROCEDURE — 85730 THROMBOPLASTIN TIME PARTIAL: CPT

## 2019-11-13 PROCEDURE — 83605 ASSAY OF LACTIC ACID: CPT

## 2019-11-13 PROCEDURE — 82947 ASSAY GLUCOSE BLOOD QUANT: CPT

## 2019-11-13 PROCEDURE — 82435 ASSAY OF BLOOD CHLORIDE: CPT

## 2019-11-13 PROCEDURE — 94640 AIRWAY INHALATION TREATMENT: CPT

## 2019-11-13 PROCEDURE — 85014 HEMATOCRIT: CPT

## 2019-11-13 PROCEDURE — 83930 ASSAY OF BLOOD OSMOLALITY: CPT

## 2019-11-13 PROCEDURE — 87633 RESP VIRUS 12-25 TARGETS: CPT

## 2019-11-13 PROCEDURE — 84295 ASSAY OF SERUM SODIUM: CPT

## 2019-11-13 PROCEDURE — 80048 BASIC METABOLIC PNL TOTAL CA: CPT

## 2019-11-13 PROCEDURE — 99291 CRITICAL CARE FIRST HOUR: CPT | Mod: 25

## 2019-11-13 PROCEDURE — 83036 HEMOGLOBIN GLYCOSYLATED A1C: CPT

## 2019-11-13 RX ORDER — METOPROLOL TARTRATE 50 MG
1 TABLET ORAL
Qty: 0 | Refills: 0 | DISCHARGE
Start: 2019-11-13

## 2019-11-13 RX ORDER — PREDNISOLONE SODIUM PHOSPHATE 1 %
2 DROPS OPHTHALMIC (EYE)
Qty: 0 | Refills: 0 | DISCHARGE
Start: 2019-11-13

## 2019-11-13 RX ORDER — LISINOPRIL 2.5 MG/1
1 TABLET ORAL
Qty: 0 | Refills: 0 | DISCHARGE
Start: 2019-11-13

## 2019-11-13 RX ORDER — METOPROLOL TARTRATE 50 MG
1 TABLET ORAL
Qty: 0 | Refills: 0 | DISCHARGE

## 2019-11-13 RX ORDER — ERYTHROMYCIN BASE 5 MG/GRAM
1 OINTMENT (GRAM) OPHTHALMIC (EYE)
Qty: 0 | Refills: 0 | DISCHARGE
Start: 2019-11-13

## 2019-11-13 RX ORDER — GABAPENTIN 400 MG/1
1 CAPSULE ORAL
Qty: 0 | Refills: 0 | DISCHARGE
Start: 2019-11-13

## 2019-11-13 RX ADMIN — GABAPENTIN 300 MILLIGRAM(S): 400 CAPSULE ORAL at 17:58

## 2019-11-13 RX ADMIN — Medication 2 DROP(S): at 12:10

## 2019-11-13 RX ADMIN — PANTOPRAZOLE SODIUM 40 MILLIGRAM(S): 20 TABLET, DELAYED RELEASE ORAL at 05:30

## 2019-11-13 RX ADMIN — Medication 1 DROP(S): at 13:00

## 2019-11-13 RX ADMIN — Medication 2: at 17:58

## 2019-11-13 RX ADMIN — DIPHENHYDRAMINE HYDROCHLORIDE AND LIDOCAINE HYDROCHLORIDE AND ALUMINUM HYDROXIDE AND MAGNESIUM HYDRO 10 MILLILITER(S): KIT at 17:58

## 2019-11-13 RX ADMIN — Medication 1: at 08:15

## 2019-11-13 RX ADMIN — Medication 100 MILLIGRAM(S): at 05:31

## 2019-11-13 RX ADMIN — DIPHENHYDRAMINE HYDROCHLORIDE AND LIDOCAINE HYDROCHLORIDE AND ALUMINUM HYDROXIDE AND MAGNESIUM HYDRO 10 MILLILITER(S): KIT at 05:31

## 2019-11-13 RX ADMIN — APIXABAN 5 MILLIGRAM(S): 2.5 TABLET, FILM COATED ORAL at 17:59

## 2019-11-13 RX ADMIN — Medication 1 DROP(S): at 15:25

## 2019-11-13 RX ADMIN — Medication 1 APPLICATION(S): at 05:30

## 2019-11-13 RX ADMIN — Medication 1 DROP(S): at 07:00

## 2019-11-13 RX ADMIN — Medication 1 DROP(S): at 05:31

## 2019-11-13 RX ADMIN — LISINOPRIL 5 MILLIGRAM(S): 2.5 TABLET ORAL at 05:30

## 2019-11-13 RX ADMIN — Medication 2 DROP(S): at 05:30

## 2019-11-13 RX ADMIN — Medication 1 DROP(S): at 11:00

## 2019-11-13 RX ADMIN — GABAPENTIN 300 MILLIGRAM(S): 400 CAPSULE ORAL at 05:30

## 2019-11-13 RX ADMIN — Medication 2 DROP(S): at 17:58

## 2019-11-13 RX ADMIN — Medication 1 APPLICATION(S): at 15:25

## 2019-11-13 RX ADMIN — APIXABAN 5 MILLIGRAM(S): 2.5 TABLET, FILM COATED ORAL at 05:31

## 2019-11-13 RX ADMIN — Medication 3: at 12:08

## 2019-11-13 RX ADMIN — Medication 1 DROP(S): at 17:59

## 2019-11-13 RX ADMIN — FINASTERIDE 5 MILLIGRAM(S): 5 TABLET, FILM COATED ORAL at 12:09

## 2019-11-13 RX ADMIN — Medication 1 DROP(S): at 09:00

## 2019-11-13 NOTE — PROGRESS NOTE ADULT - REASON FOR ADMISSION
zoster

## 2019-11-13 NOTE — PROGRESS NOTE ADULT - SUBJECTIVE AND OBJECTIVE BOX
CARDIOLOGY     PROGRESS  NOTE   ________________________________________________    CHIEF COMPLAINT:Patient is a 84y old  Male who presents with a chief complaint of zoster (13 Nov 2019 09:10)  doing better, decrease po intake  	  REVIEW OF SYSTEMS:  CONSTITUTIONAL: No fever, weight loss, or fatigue  EYES: No eye pain, visual disturbances, or discharge  ENT:  No difficulty hearing, tinnitus, vertigo; No sinus or throat pain  NECK: No pain or stiffness  RESPIRATORY: No cough, wheezing, chills or hemoptysis; No Shortness of Breath  CARDIOVASCULAR: No chest pain, palpitations, passing out, dizziness, or leg swelling  GASTROINTESTINAL: No abdominal or epigastric pain. No nausea, vomiting, or hematemesis; No diarrhea or constipation. No melena or hematochezia.  GENITOURINARY: No dysuria, frequency, hematuria, or incontinence  NEUROLOGICAL: No headaches, memory loss, loss of strength, numbness, or tremors  SKIN: No itching, burning, rashes, or lesions   LYMPH Nodes: No enlarged glands  ENDOCRINE: No heat or cold intolerance; No hair loss  MUSCULOSKELETAL: No joint pain or swelling; No muscle, back, or extremity pain  PSYCHIATRIC: No depression, anxiety, mood swings, or difficulty sleeping  HEME/LYMPH: No easy bruising, or bleeding gums  ALLERGY AND IMMUNOLOGIC: No hives or eczema	    [ ] All others negative	  [ ] Unable to obtain    PHYSICAL EXAM:  T(C): 36.9 (11-13-19 @ 04:07), Max: 37.4 (11-12-19 @ 11:13)  HR: 75 (11-13-19 @ 04:07) (75 - 101)  BP: 146/73 (11-13-19 @ 04:07) (142/78 - 158/94)  RR: 18 (11-13-19 @ 04:07) (16 - 18)  SpO2: 97% (11-13-19 @ 04:07) (96% - 98%)  Wt(kg): --  I&O's Summary    12 Nov 2019 07:01  -  13 Nov 2019 07:00  --------------------------------------------------------  IN: 1080 mL / OUT: 1825 mL / NET: -745 mL        Appearance: Normal	  HEENT:   Normal oral mucosa, PERRL, EOMI	  Lymphatic: No lymphadenopathy  Cardiovascular: Normal S1 S2, No JVD, +murmurs, No edema  Respiratory: Lungs clear to auscultation	  Psychiatry: A & O x 3, Mood & affect appropriate  Gastrointestinal:  Soft, Non-tender, + BS	  Skin: No rashes, No ecchymoses, No cyanosis	  Neurologic: Non-focal  Extremities: Normal range of motion, No clubbing, cyanosis or edema  Vascular: Peripheral pulses palpable 2+ bilaterally    MEDICATIONS  (STANDING):  apixaban 5 milliGRAM(s) Oral two times a day  artificial tears (preservative free) Ophthalmic Solution 1 Drop(s) Left EYE every 2 hours  dextrose 5%. 1000 milliLiter(s) (50 mL/Hr) IV Continuous <Continuous>  dextrose 50% Injectable 12.5 Gram(s) IV Push once  dextrose 50% Injectable 25 Gram(s) IV Push once  dextrose 50% Injectable 25 Gram(s) IV Push once  erythromycin   Ointment 1 Application(s) Left EYE three times a day  finasteride 5 milliGRAM(s) Oral daily  FIRST- Mouthwash  BLM 10 milliLiter(s) Swish and Spit two times a day  gabapentin 300 milliGRAM(s) Oral two times a day  insulin lispro (HumaLOG) corrective regimen sliding scale   SubCutaneous three times a day before meals  insulin lispro (HumaLOG) corrective regimen sliding scale   SubCutaneous at bedtime  lisinopril 5 milliGRAM(s) Oral daily  metoprolol tartrate 75 milliGRAM(s) Oral at bedtime  metoprolol tartrate 100 milliGRAM(s) Oral daily  pantoprazole    Tablet 40 milliGRAM(s) Oral before breakfast  prednisoLONE acetate 1% Suspension 2 Drop(s) Left EYE four times a day  Ure-Na 15 Gram(s) 15 Gram(s) Oral two times a day      TELEMETRY: 	    ECG:  	  RADIOLOGY:  OTHER: 	  	  LABS:	 	    CARDIAC MARKERS:            11-13    131<L>  |  94<L>  |  22  ----------------------------<  195<H>  4.3   |  26  |  0.86    Ca    9.0      13 Nov 2019 05:55      proBNP:   Lipid Profile:   HgA1c: Hemoglobin A1C, Whole Blood: 7.3 % (11-06 @ 09:05)    TSH: Thyroid Stimulating Hormone, Serum: 1.25 uIU/mL (11-06 @ 08:51)          Assessment and plan  ---------------------------  doing better  continue current meds  a.fib rate is controlled  sodium is improved  ac for a.fib  cardiomyopathy stable

## 2019-11-13 NOTE — PROGRESS NOTE ADULT - PROBLEM SELECTOR PLAN 1
monitor H/H daily, transfuse prn  hgb stable over the past few days  no overt signs of GIB   Anemia workup reviewed   heme/onc following  cont PPI PO for GI ppx   No GI contraindication to continue AC   monitor stool color closely while on AC   o/p f/u scheduled with Dr. 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

## 2019-11-13 NOTE — PROGRESS NOTE ADULT - SUBJECTIVE AND OBJECTIVE BOX
INTERVAL HPI/OVERNIGHT EVENTS:    Patient seen and examined with wife at bedside. He is feeling well  he denies abdominal pain, n/v  + having brown stools  await am labs     MEDICATIONS  (STANDING):  apixaban 5 milliGRAM(s) Oral two times a day  artificial tears (preservative free) Ophthalmic Solution 1 Drop(s) Left EYE every 2 hours  dextrose 5%. 1000 milliLiter(s) (50 mL/Hr) IV Continuous <Continuous>  dextrose 50% Injectable 12.5 Gram(s) IV Push once  dextrose 50% Injectable 25 Gram(s) IV Push once  dextrose 50% Injectable 25 Gram(s) IV Push once  erythromycin   Ointment 1 Application(s) Left EYE three times a day  finasteride 5 milliGRAM(s) Oral daily  FIRST- Mouthwash  BLM 10 milliLiter(s) Swish and Spit two times a day  gabapentin 300 milliGRAM(s) Oral two times a day  insulin lispro (HumaLOG) corrective regimen sliding scale   SubCutaneous three times a day before meals  insulin lispro (HumaLOG) corrective regimen sliding scale   SubCutaneous at bedtime  lisinopril 5 milliGRAM(s) Oral daily  metoprolol tartrate 75 milliGRAM(s) Oral at bedtime  metoprolol tartrate 100 milliGRAM(s) Oral daily  pantoprazole    Tablet 40 milliGRAM(s) Oral before breakfast  prednisoLONE acetate 1% Suspension 2 Drop(s) Left EYE four times a day  Ure-Na 15 Gram(s) 15 Gram(s) Oral two times a day    MEDICATIONS  (PRN):  dextrose 40% Gel 15 Gram(s) Oral once PRN Blood Glucose LESS THAN 70 milliGRAM(s)/deciliter  glucagon  Injectable 1 milliGRAM(s) IntraMuscular once PRN Glucose LESS THAN 70 milligrams/deciliter  melatonin 3 milliGRAM(s) Oral at bedtime PRN Insomnia  oxyCODONE    IR 5 milliGRAM(s) Oral every 12 hours PRN Severe Pain (7 - 10)      Allergies    No Known Allergies    Intolerances        Review of Systems:    General:  No wt loss, fevers, chills, night sweats,fatigue,   Eyes:  Good vision, no reported pain  ENT:  No sore throat, pain, runny nose, dysphagia  CV:  No pain, palpitations, hypo/hypertension  Resp:  No dyspnea, cough, tachypnea, wheezing  GI:  No pain, No nausea, No vomiting, No diarrhea, No constipation, No weight loss, No fever, No pruritis, No rectal bleeding, No melena, No dysphagia  :  No pain, bleeding, incontinence, nocturia  Muscle:  No pain, weakness  Neuro:  No weakness, tingling, memory problems  Psych:  No fatigue, insomnia, mood problems, depression  Endocrine:  No polyuria, polydypsia, cold/heat intolerance  Heme:  No petechiae, ecchymosis, easy bruisability  Skin:  No rash, tattoos, scars, edema      Vital Signs Last 24 Hrs  T(C): 36.9 (13 Nov 2019 04:07), Max: 37.4 (12 Nov 2019 11:13)  T(F): 98.4 (13 Nov 2019 04:07), Max: 99.3 (12 Nov 2019 11:13)  HR: 75 (13 Nov 2019 04:07) (75 - 101)  BP: 146/73 (13 Nov 2019 04:07) (142/78 - 158/94)  BP(mean): --  RR: 18 (13 Nov 2019 04:07) (16 - 18)  SpO2: 97% (13 Nov 2019 04:07) (96% - 98%)    PHYSICAL EXAM:    Constitutional: NAD, well-developed  HEENT: EOMI, throat clear  Neck: No LAD, supple  Respiratory: CTA and P  Cardiovascular: S1 and S2, RRR, no M  Gastrointestinal: BS+, soft, NT/ND, neg HSM,  Extremities: No peripheral edema, neg clubing, cyanosis  Vascular: 2+ peripheral pulses  Neurological: A/O x 3, no focal deficits  Psychiatric: Normal mood, normal affect  Skin: No rashes      LABS:    11-13    131<L>  |  94<L>  |  22  ----------------------------<  195<H>  4.3   |  26  |  0.86    Ca    9.0      13 Nov 2019 05:55            RADIOLOGY & ADDITIONAL TESTS:

## 2019-11-13 NOTE — PROGRESS NOTE ADULT - ASSESSMENT
84  yr  w/        HTN,   HLD,    DM (glipizide),   CKD. , CAD  on ct, . AF  on eliquis,  diastolic  chf       marginal  zone lymphoma ,    s/p chemo by dr brown,  july/ 2019/   none  recently,  , not on chemo at present   .  h/o  anemia/ low platelets              admitted  with  left face/  eyelids,  h  zoster/  with crusted lesions,  ,     face / V1  distrubution, trigeminal  nerve /  noted  by pt  and  family. around  10/ 22/ 19,  while  in [facility]/    saw   a  dr  on 10/ 24/ 19, in [place]  no lesions,  left  ear  eyes.  no  dendritic  corneal  lesions  per  ophthalmology/ superficial punctate  keratitis  noted  on iv Acyclovir   DM,  follow fs   AF , on   asa/ Eliquis/  lopressor  bid/  lisinopril/ echo  ef  56     ID  eval noted   seen by  opthalmology /  local rx   as  per  ophthalmology   hyponatremia. , stable     blood  c/s,  negative /  rvp, negative  anemia,  seen by gi  afebrile  now/  pancytopenia now/ heme eval  suspect pancytopenia  is  from  viral process/ some of  it is  chronic related  to prior  malignancy  pt has  zoster  for  about   15 days  or  more, pt  not expected to be  contagious  now   maculo papular lesions on extremities, not  typical  appearing  for  h  zoster    spoke  to  wife/   per  PT, needs  rehab  seen by heme/  on procrit   hyponatremia., improving/ sodium is  131. ,  on   ure Na.    d/c  to  rehab  today/   cleared  for  d/c/        awaiting auth         < from: CT Chest No Cont (06.02.19 @ 20:18) >  IMPRESSION:Small bilateral pleural effusions, left greater than right.  Interlobular septal thickening representing pulmonary edema.  Small pericarial effusion.  Coronary artery atherosclerotic disease.  < end of copied text >

## 2019-11-13 NOTE — PROGRESS NOTE ADULT - SUBJECTIVE AND OBJECTIVE BOX
no comlaints    REVIEW OF SYSTEMS:  GEN: no fever,    no chills  RESP: no SOB,   no cough  CVS: no chest pain,   no palpitations  GI: no abdominal pain,   no nausea,   no vomiting,   no constipation,   no diarrhea  : no dysuria,   no frequency  NEURO: no headache,   no dizziness  PSYCH: no depression,   not anxious  Derm : no rash    MEDICATIONS  (STANDING):  apixaban 5 milliGRAM(s) Oral two times a day  artificial tears (preservative free) Ophthalmic Solution 1 Drop(s) Left EYE every 2 hours  dextrose 5%. 1000 milliLiter(s) (50 mL/Hr) IV Continuous <Continuous>  dextrose 50% Injectable 12.5 Gram(s) IV Push once  dextrose 50% Injectable 25 Gram(s) IV Push once  dextrose 50% Injectable 25 Gram(s) IV Push once  erythromycin   Ointment 1 Application(s) Left EYE three times a day  finasteride 5 milliGRAM(s) Oral daily  FIRST- Mouthwash  BLM 10 milliLiter(s) Swish and Spit two times a day  gabapentin 300 milliGRAM(s) Oral two times a day  insulin lispro (HumaLOG) corrective regimen sliding scale   SubCutaneous three times a day before meals  insulin lispro (HumaLOG) corrective regimen sliding scale   SubCutaneous at bedtime  lisinopril 5 milliGRAM(s) Oral daily  metoprolol tartrate 75 milliGRAM(s) Oral at bedtime  metoprolol tartrate 100 milliGRAM(s) Oral daily  pantoprazole    Tablet 40 milliGRAM(s) Oral before breakfast  prednisoLONE acetate 1% Suspension 2 Drop(s) Left EYE four times a day  Ure-Na 15 Gram(s) 15 Gram(s) Oral two times a day    MEDICATIONS  (PRN):  dextrose 40% Gel 15 Gram(s) Oral once PRN Blood Glucose LESS THAN 70 milliGRAM(s)/deciliter  glucagon  Injectable 1 milliGRAM(s) IntraMuscular once PRN Glucose LESS THAN 70 milligrams/deciliter  melatonin 3 milliGRAM(s) Oral at bedtime PRN Insomnia  oxyCODONE    IR 5 milliGRAM(s) Oral every 12 hours PRN Severe Pain (7 - 10)      Vital Signs Last 24 Hrs  T(C): 36.9 (13 Nov 2019 04:07), Max: 37.4 (12 Nov 2019 11:13)  T(F): 98.4 (13 Nov 2019 04:07), Max: 99.3 (12 Nov 2019 11:13)  HR: 75 (13 Nov 2019 04:07) (75 - 101)  BP: 146/73 (13 Nov 2019 04:07) (142/78 - 158/94)  BP(mean): --  RR: 18 (13 Nov 2019 04:07) (16 - 18)  SpO2: 97% (13 Nov 2019 04:07) (96% - 98%)  CAPILLARY BLOOD GLUCOSE      POCT Blood Glucose.: 183 mg/dL (13 Nov 2019 07:46)  POCT Blood Glucose.: 219 mg/dL (12 Nov 2019 21:51)  POCT Blood Glucose.: 217 mg/dL (12 Nov 2019 16:51)  POCT Blood Glucose.: 240 mg/dL (12 Nov 2019 11:49)    I&O's Summary    12 Nov 2019 07:01  -  13 Nov 2019 07:00  --------------------------------------------------------  IN: 1080 mL / OUT: 1825 mL / NET: -745 mL        PHYSICAL EXAM:  HEAD:  Atraumatic, Normocephalic  NECK: Supple, No   JVD  CHEST/LUNG:   no     rales,     no,    rhonchi  HEART: Regular rate and rhythm;         murmur  ABDOMEN: Soft, Nontender, ;   EXTREMITIES:   no     edema  NEUROLOGY:  alert    LABS:    11-13    131<L>  |  94<L>  |  22  ----------------------------<  195<H>  4.3   |  26  |  0.86    Ca    9.0      13 Nov 2019 05:55                    Hemoglobin A1C, Whole Blood: 7.3 % (11-06 @ 09:05)    Thyroid Stimulating Hormone, Serum: 1.25 uIU/mL (11-06 @ 08:51)          Consultant(s) Notes Reviewed:      Care Discussed with Consultants/Other Providers:

## 2019-11-13 NOTE — DISCHARGE NOTE NURSING/CASE MANAGEMENT/SOCIAL WORK - PATIENT PORTAL LINK FT
You can access the FollowMyHealth Patient Portal offered by [facility] by registering at the following website: [email or web address]. By joining [employer]’s FollowMyHealth portal, you will also be able to view your health information using other applications (apps) compatible with our system.

## 2019-11-15 LAB
CORTICOSTEROID BINDING GLOBULIN RESULT: 2.3 MG/DL — SIGNIFICANT CHANGE UP
CORTIS F/TOTAL MFR SERPL: 17 % — SIGNIFICANT CHANGE UP
CORTIS SERPL-MCNC: 17 UG/DL — SIGNIFICANT CHANGE UP
CORTISOL, FREE RESULT: 2.9 UG/DL — HIGH

## 2019-11-22 ENCOUNTER — INPATIENT (INPATIENT)
Facility: HOSPITAL | Age: 84
LOS: 4 days | Discharge: HOME | DRG: 309 | End: 2019-11-27
Attending: INTERNAL MEDICINE | Admitting: INTERNAL MEDICINE
Payer: MEDICARE

## 2019-11-22 VITALS
DIASTOLIC BLOOD PRESSURE: 65 MMHG | RESPIRATION RATE: 20 BRPM | WEIGHT: 139.99 LBS | SYSTOLIC BLOOD PRESSURE: 105 MMHG | TEMPERATURE: 97 F | OXYGEN SATURATION: 96 % | HEART RATE: 100 BPM

## 2019-11-22 DIAGNOSIS — R53.1 WEAKNESS: ICD-10-CM

## 2019-11-22 LAB
ALBUMIN SERPL ELPH-MCNC: 3.5 G/DL — SIGNIFICANT CHANGE UP (ref 3.3–5)
ALP SERPL-CCNC: 54 U/L — SIGNIFICANT CHANGE UP (ref 40–120)
ALT FLD-CCNC: 16 U/L — SIGNIFICANT CHANGE UP (ref 10–45)
ANION GAP SERPL CALC-SCNC: 18 MMOL/L — HIGH (ref 5–17)
APPEARANCE UR: CLEAR — SIGNIFICANT CHANGE UP
APTT BLD: 31.5 SEC — SIGNIFICANT CHANGE UP (ref 27.5–36.3)
AST SERPL-CCNC: 19 U/L — SIGNIFICANT CHANGE UP (ref 10–40)
BACTERIA # UR AUTO: NEGATIVE — SIGNIFICANT CHANGE UP
BASOPHILS # BLD AUTO: 0.1 K/UL — SIGNIFICANT CHANGE UP (ref 0–0.2)
BASOPHILS NFR BLD AUTO: 1.7 % — SIGNIFICANT CHANGE UP (ref 0–2)
BILIRUB SERPL-MCNC: 0.5 MG/DL — SIGNIFICANT CHANGE UP (ref 0.2–1.2)
BILIRUB UR-MCNC: NEGATIVE — SIGNIFICANT CHANGE UP
BUN SERPL-MCNC: 45 MG/DL — HIGH (ref 7–23)
CALCIUM SERPL-MCNC: 9.1 MG/DL — SIGNIFICANT CHANGE UP (ref 8.4–10.5)
CHLORIDE SERPL-SCNC: 93 MMOL/L — LOW (ref 96–108)
CO2 SERPL-SCNC: 19 MMOL/L — LOW (ref 22–31)
COLOR SPEC: SIGNIFICANT CHANGE UP
CREAT SERPL-MCNC: 1.1 MG/DL — SIGNIFICANT CHANGE UP (ref 0.5–1.3)
DIFF PNL FLD: NEGATIVE — SIGNIFICANT CHANGE UP
EOSINOPHIL # BLD AUTO: 0.25 K/UL — SIGNIFICANT CHANGE UP (ref 0–0.5)
EOSINOPHIL NFR BLD AUTO: 4.4 % — SIGNIFICANT CHANGE UP (ref 0–6)
EPI CELLS # UR: 1 /HPF — SIGNIFICANT CHANGE UP
GLUCOSE BLDC GLUCOMTR-MCNC: 223 MG/DL — HIGH (ref 70–99)
GLUCOSE SERPL-MCNC: 260 MG/DL — HIGH (ref 70–99)
GLUCOSE UR QL: ABNORMAL
HCT VFR BLD CALC: 27 % — LOW (ref 39–50)
HGB BLD-MCNC: 9.5 G/DL — LOW (ref 13–17)
HYALINE CASTS # UR AUTO: 0 /LPF — SIGNIFICANT CHANGE UP (ref 0–2)
INR BLD: 1.55 RATIO — HIGH (ref 0.88–1.16)
KETONES UR-MCNC: NEGATIVE — SIGNIFICANT CHANGE UP
LEUKOCYTE ESTERASE UR-ACNC: NEGATIVE — SIGNIFICANT CHANGE UP
LYMPHOCYTES # BLD AUTO: 0.81 K/UL — LOW (ref 1–3.3)
LYMPHOCYTES # BLD AUTO: 14 % — SIGNIFICANT CHANGE UP (ref 13–44)
MCHC RBC-ENTMCNC: 33 PG — SIGNIFICANT CHANGE UP (ref 27–34)
MCHC RBC-ENTMCNC: 35.2 GM/DL — SIGNIFICANT CHANGE UP (ref 32–36)
MCV RBC AUTO: 93.8 FL — SIGNIFICANT CHANGE UP (ref 80–100)
MONOCYTES # BLD AUTO: 0.66 K/UL — SIGNIFICANT CHANGE UP (ref 0–0.9)
MONOCYTES NFR BLD AUTO: 11.4 % — SIGNIFICANT CHANGE UP (ref 2–14)
NEUTROPHILS # BLD AUTO: 3.91 K/UL — SIGNIFICANT CHANGE UP (ref 1.8–7.4)
NEUTROPHILS NFR BLD AUTO: 65.8 % — SIGNIFICANT CHANGE UP (ref 43–77)
NITRITE UR-MCNC: NEGATIVE — SIGNIFICANT CHANGE UP
PH UR: 5.5 — SIGNIFICANT CHANGE UP (ref 5–8)
PLATELET # BLD AUTO: 185 K/UL — SIGNIFICANT CHANGE UP (ref 150–400)
POTASSIUM SERPL-MCNC: 5.4 MMOL/L — HIGH (ref 3.5–5.3)
POTASSIUM SERPL-SCNC: 5.4 MMOL/L — HIGH (ref 3.5–5.3)
PROT SERPL-MCNC: 5.9 G/DL — LOW (ref 6–8.3)
PROT UR-MCNC: NEGATIVE — SIGNIFICANT CHANGE UP
PROTHROM AB SERPL-ACNC: 18.1 SEC — HIGH (ref 10–12.9)
RBC # BLD: 2.88 M/UL — LOW (ref 4.2–5.8)
RBC # FLD: 15.5 % — HIGH (ref 10.3–14.5)
RBC CASTS # UR COMP ASSIST: 5 /HPF — HIGH (ref 0–4)
SODIUM SERPL-SCNC: 130 MMOL/L — LOW (ref 135–145)
SP GR SPEC: 1.02 — SIGNIFICANT CHANGE UP (ref 1.01–1.02)
UROBILINOGEN FLD QL: NEGATIVE — SIGNIFICANT CHANGE UP
WBC # BLD: 5.78 K/UL — SIGNIFICANT CHANGE UP (ref 3.8–10.5)
WBC # FLD AUTO: 5.78 K/UL — SIGNIFICANT CHANGE UP (ref 3.8–10.5)
WBC UR QL: 1 /HPF — SIGNIFICANT CHANGE UP (ref 0–5)

## 2019-11-22 PROCEDURE — 93010 ELECTROCARDIOGRAM REPORT: CPT

## 2019-11-22 PROCEDURE — 71045 X-RAY EXAM CHEST 1 VIEW: CPT | Mod: 26

## 2019-11-22 PROCEDURE — 99285 EMERGENCY DEPT VISIT HI MDM: CPT

## 2019-11-22 RX ORDER — SIMVASTATIN 20 MG/1
40 TABLET, FILM COATED ORAL AT BEDTIME
Refills: 0 | Status: DISCONTINUED | OUTPATIENT
Start: 2019-11-22 | End: 2019-11-27

## 2019-11-22 RX ORDER — DEXTROSE 50 % IN WATER 50 %
25 SYRINGE (ML) INTRAVENOUS ONCE
Refills: 0 | Status: DISCONTINUED | OUTPATIENT
Start: 2019-11-22 | End: 2019-11-27

## 2019-11-22 RX ORDER — PREDNISOLONE SODIUM PHOSPHATE 1 %
2 DROPS OPHTHALMIC (EYE)
Refills: 0 | Status: DISCONTINUED | OUTPATIENT
Start: 2019-11-22 | End: 2019-11-27

## 2019-11-22 RX ORDER — DEXTROSE 50 % IN WATER 50 %
12.5 SYRINGE (ML) INTRAVENOUS ONCE
Refills: 0 | Status: DISCONTINUED | OUTPATIENT
Start: 2019-11-22 | End: 2019-11-27

## 2019-11-22 RX ORDER — FINASTERIDE 5 MG/1
5 TABLET, FILM COATED ORAL DAILY
Refills: 0 | Status: DISCONTINUED | OUTPATIENT
Start: 2019-11-22 | End: 2019-11-27

## 2019-11-22 RX ORDER — METOPROLOL TARTRATE 50 MG
50 TABLET ORAL
Refills: 0 | Status: DISCONTINUED | OUTPATIENT
Start: 2019-11-22 | End: 2019-11-23

## 2019-11-22 RX ORDER — GLUCAGON INJECTION, SOLUTION 0.5 MG/.1ML
1 INJECTION, SOLUTION SUBCUTANEOUS ONCE
Refills: 0 | Status: DISCONTINUED | OUTPATIENT
Start: 2019-11-22 | End: 2019-11-27

## 2019-11-22 RX ORDER — INSULIN LISPRO 100/ML
VIAL (ML) SUBCUTANEOUS
Refills: 0 | Status: DISCONTINUED | OUTPATIENT
Start: 2019-11-22 | End: 2019-11-25

## 2019-11-22 RX ORDER — SODIUM CHLORIDE 9 MG/ML
1000 INJECTION INTRAMUSCULAR; INTRAVENOUS; SUBCUTANEOUS
Refills: 0 | Status: DISCONTINUED | OUTPATIENT
Start: 2019-11-22 | End: 2019-11-23

## 2019-11-22 RX ORDER — SODIUM CHLORIDE 9 MG/ML
500 INJECTION INTRAMUSCULAR; INTRAVENOUS; SUBCUTANEOUS ONCE
Refills: 0 | Status: COMPLETED | OUTPATIENT
Start: 2019-11-22 | End: 2019-11-22

## 2019-11-22 RX ORDER — APIXABAN 2.5 MG/1
5 TABLET, FILM COATED ORAL
Refills: 0 | Status: DISCONTINUED | OUTPATIENT
Start: 2019-11-22 | End: 2019-11-27

## 2019-11-22 RX ORDER — SODIUM CHLORIDE 9 MG/ML
1000 INJECTION, SOLUTION INTRAVENOUS
Refills: 0 | Status: DISCONTINUED | OUTPATIENT
Start: 2019-11-22 | End: 2019-11-27

## 2019-11-22 RX ORDER — FOLIC ACID 0.8 MG
1 TABLET ORAL DAILY
Refills: 0 | Status: DISCONTINUED | OUTPATIENT
Start: 2019-11-22 | End: 2019-11-27

## 2019-11-22 RX ORDER — PANTOPRAZOLE SODIUM 20 MG/1
40 TABLET, DELAYED RELEASE ORAL
Refills: 0 | Status: DISCONTINUED | OUTPATIENT
Start: 2019-11-22 | End: 2019-11-27

## 2019-11-22 RX ORDER — GABAPENTIN 400 MG/1
300 CAPSULE ORAL
Refills: 0 | Status: DISCONTINUED | OUTPATIENT
Start: 2019-11-22 | End: 2019-11-27

## 2019-11-22 RX ORDER — DEXTROSE 50 % IN WATER 50 %
15 SYRINGE (ML) INTRAVENOUS ONCE
Refills: 0 | Status: DISCONTINUED | OUTPATIENT
Start: 2019-11-22 | End: 2019-11-27

## 2019-11-22 RX ORDER — ERYTHROMYCIN BASE 5 MG/GRAM
1 OINTMENT (GRAM) OPHTHALMIC (EYE) THREE TIMES A DAY
Refills: 0 | Status: DISCONTINUED | OUTPATIENT
Start: 2019-11-22 | End: 2019-11-27

## 2019-11-22 RX ADMIN — Medication 2 DROP(S): at 23:04

## 2019-11-22 RX ADMIN — Medication 1 APPLICATION(S): at 23:04

## 2019-11-22 RX ADMIN — APIXABAN 5 MILLIGRAM(S): 2.5 TABLET, FILM COATED ORAL at 18:41

## 2019-11-22 RX ADMIN — Medication 1 TABLET(S): at 23:05

## 2019-11-22 RX ADMIN — FINASTERIDE 5 MILLIGRAM(S): 5 TABLET, FILM COATED ORAL at 18:41

## 2019-11-22 RX ADMIN — Medication 1 MILLIGRAM(S): at 18:41

## 2019-11-22 RX ADMIN — Medication 50 MILLIGRAM(S): at 18:41

## 2019-11-22 RX ADMIN — SIMVASTATIN 40 MILLIGRAM(S): 20 TABLET, FILM COATED ORAL at 23:04

## 2019-11-22 RX ADMIN — GABAPENTIN 300 MILLIGRAM(S): 400 CAPSULE ORAL at 18:40

## 2019-11-22 RX ADMIN — SODIUM CHLORIDE 60 MILLILITER(S): 9 INJECTION INTRAMUSCULAR; INTRAVENOUS; SUBCUTANEOUS at 19:51

## 2019-11-22 RX ADMIN — SODIUM CHLORIDE 500 MILLILITER(S): 9 INJECTION INTRAMUSCULAR; INTRAVENOUS; SUBCUTANEOUS at 14:08

## 2019-11-22 NOTE — H&P ADULT - ASSESSMENT
83 yo male with pmh DM,, Afib, lymphoma s.p chemo in july, recent admission for disseminated herpes zoster here for concern og high HR and low BP today. pts wife states pt was c/o dizziness this am, she took his BP today which was low in the 80s and HR was high in the 120s, checked it again and BP was lower , called cardiologist and told him to come to the ED. Pt otherwise with no complaints, has been fatigued since hospitalization and lethargic, otherwise no cough, congestion, abdominal pain, fevers, chills, nausea, vomiting, dysuria.  hypotension/ Tachycardia r/o sepsis. dehydration  check cultures   no abx yet  ivf gentle hydration  decrease beta blocker  tele  ac continue  hold lisinopril for now

## 2019-11-22 NOTE — PHYSICAL THERAPY INITIAL EVALUATION ADULT - ADDITIONAL COMMENTS
Pt resides in a PH with his spouse with no steps to negotiate. PTA pt was (I) with ADLs and was ambulating independently without occasional use of RW.

## 2019-11-22 NOTE — ED PROVIDER NOTE - OBJECTIVE STATEMENT
83 yo male with pmh DM,, Afib, lymphoma s.p chemo in july, recent admission for disseminated herpes zoster here for concern og high HR and low BP today. pts wife states pt was c/o dizziness this am, she took his BP today which was low in the 80s and HR was high in the 120s, checked it again and BP was lower , called cardiologist and told him to come to the ED. Pt otherwise with no complaints, has been fatigued since hospitalization and lethargic, 83 yo male with pmh DM,, Afib, lymphoma s.p chemo in july, recent admission for disseminated herpes zoster here for concern og high HR and low BP today. pts wife states pt was c/o dizziness this am, she took his BP today which was low in the 80s and HR was high in the 120s, checked it again and BP was lower , called cardiologist and told him to come to the ED. Pt otherwise with no complaints, has been fatigued since hospitalization and lethargic, otherwise no cough, congestion, abdominal pain, fevers, chills, nausea, vomiting, dysuria.

## 2019-11-22 NOTE — ED PROVIDER NOTE - PHYSICAL EXAMINATION
A&Ox3, NAD. NCAT. PERRL right eye, left eye dilated, nonreactive, EOMI. Neck supple, no LAD. Lungs CTAB. +S1S2, irregular rate and rhythm, No m/r/g. Abd soft, NT/ND, +BS, no rebound or guarding. Extremities: cap refill <2, pulses in distal extremities 4+, no edema. Skin: crusted skin to left forehead without rash. CN II-XII intact. Strength 5/5 UE/LE. Sensations intact throughout.

## 2019-11-22 NOTE — PHYSICAL THERAPY INITIAL EVALUATION ADULT - CRITERIA FOR SKILLED THERAPEUTIC INTERVENTIONS
anticipated equipment needs at discharge/rehab potential/anticipated discharge recommendation/impairments found/therapy frequency/functional limitations in following categories

## 2019-11-22 NOTE — CONSULT NOTE ADULT - ASSESSMENT
84  yr  w/        HTN,   HLD,    DM (glipizide),   CKD. , CAD  on ct, . AF  on eliquis,  diastolic  chf       marginal  zone lymphoma ,    s/p chemo by dr brown,  july/ 2019/   none  recently,  , not on chemo at present   .  h/o  anemia/. seen by gi on past visit,           s/p  recent   h  zoster/  with crusted lesions,  ,  face / V1  distrubution, trigeminal  nerve        admitted with hypotension .  sbp  in 80/s  and tachycardia   iv hydration  orthostatics    BB  dose lowered  DM,  follow fs   AF , on   asa/ Eliquis/  lopressor  bid/  lisinopril/ echo  ef  56  chronic  anmeia     h/o   chronic  hyponatremia.,   sodium of  130   rpt echo/  echo of  6/18. with small pericardial effusion         < from: CT Chest No Cont (06.02.19 @ 20:18) >  IMPRESSION:Small bilateral pleural effusions, left greater than right.  Interlobular septal thickening representing pulmonary edema.  Small pericarial effusion.  Coronary artery atherosclerotic disease.  < end of copied text ><     from: Transthoracic Echocardiogram (06.05.19 @ 10:32) >  onclusions:  1. Endocardium not well visualized; grossly normal left  ventricular systolic function.  2. Normal right ventricular size and function.  3. Normal tricuspid valve. Minimal tricuspid regurgitation.  4. Small pericardial effusion.  *** No previous Echo exam.  < end of copied text >

## 2019-11-22 NOTE — CONSULT NOTE ADULT - SUBJECTIVE AND OBJECTIVE BOX
HPI:  CHIEF COMPLAINT:Patient is a 84y old  Male who presents with a chief complaint of hypotension/tachycardia.    HPI:   83 yo male with pmh     DM,, Afib, lymphoma s.p chemo in july, recent admission for disseminated herpes zoster     here for  high HR and low BP today.    pts wife states pt was c/o dizziness this am, she took his BP today which was low in the 80s and HR was high in the 120s,  , called cardiologist and told him to come to the ED.   Pt otherwise with no complaints, has been fatigued since hospitalization and lethargic, otherwise no cough, congestion, abdominal pain, fevers, chills, nausea, vomiting, dysuria.    PAST MEDICAL & SURGICAL HISTORY:  Atrial fibrillation  Anemia  DM2 (diabetes mellitus, type 2)  Osteoporosis  GERD (gastroesophageal reflux disease)  Benign prostatic hypertrophy  HTN (hypertension)  No significant past surgical history      MEDICATIONS  (STANDING):  noted    MEDICATIONS  (PRN):      FAMILY HISTORY:      SOCIAL HISTORY:    [ ] Non-smoker  [ ] Smoker  [ ] Alcohol    Allergies    No Known Allergies    Intolerances    	    REVIEW OF SYSTEMS:  CONSTITUTIONAL: No fever, weight loss, or fatigue  EYES: No eye pain, visual disturbances, or discharge  ENT:  No difficulty hearing, tinnitus, vertigo; No sinus or throat pain  NECK: No pain or stiffness  RESPIRATORY: No cough, wheezing, chills or hemoptysis; + Shortness of Breath  CARDIOVASCULAR: No chest pain,+ palpitations, no passing, or leg swelling  GASTROINTESTINAL: No abdominal or epigastric pain. No nausea, vomiting, or hematemesis; No diarrhea or constipation. No melena or hematochezia.  GENITOURINARY: No dysuria, frequency, hematuria, or incontinence  NEUROLOGICAL: No headaches, memory loss, loss of strength, numbness, or tremors, +dizziness  SKIN: No itching, burning, rashes, or lesions   LYMPH Nodes: No enlarged glands  ENDOCRINE: No heat or cold intolerance; No hair loss  MUSCULOSKELETAL: No joint pain or swelling; No muscle, back, or extremity pain  PSYCHIATRIC: No depression, anxiety, mood swings, or difficulty sleeping  HEME/LYMPH: No easy bruising, or bleeding gums  ALLERGY AND IMMUNOLOGIC: No hives or eczema	    [ ] All others negative	  [ ] Unable to obtain    PHYSICAL EXAM:  T(C): 36.7 (19 @ 16:30), Max: 36.7 (19 @ 16:30)  HR: 105 (19 @ 16:30) (100 - 105)  BP: 109/63 (19 @ 16:30) (105/65 - 109/63)  RR: 20 (19 @ 16:30) (20 - 20)  SpO2: 97% (19 @ 16:30) (96% - 97%)  Wt(kg): --  I&O's Summary      Appearance: Normal	  HEENT:   Normal oral mucosa, PERRL, EOMI	  Lymphatic: No lymphadenopathy  Cardiovascular: Normal S1 S2, No JVD, +murmurs, No edema  Respiratory: Lungs clear to auscultation	  Psychiatry: A & O x 3, Mood & affect appropriate  Gastrointestinal:  Soft, Non-tender, + BS	  Skin: No rashes, No ecchymoses, No cyanosis	  Neurologic: Non-focal  Extremities: Normal range of motion, No clubbing, cyanosis or edema  Vascular: Peripheral pulses palpable 2+ bilaterally  healed  zoster, right face    TELEMETRY: 	    ECG:  	  RADIOLOGY:  OTHER: 	  	  LABS:	 	    CARDIAC MARKERS:                              9.5    5.78  )-----------( 185      ( 2019 14:12 )             27.0         130<L>  |  93<L>  |  45<H>  ----------------------------<  260<H>  5.4<H>   |  19<L>  |  1.10    Ca    9.1      2019 14:12    TPro  5.9<L>  /  Alb  3.5  /  TBili  0.5  /  DBili  x   /  AST  19  /  ALT  16  /  AlkPhos  54      proBNP:   Lipid Profile:   HgA1c:   TSH:   PT/INR - ( 2019 14:12 )   PT: 18.1 sec;   INR: 1.55 ratio         PTT - ( 2019 14:12 )  PTT:31.5 sec    PREVIOUS DIAGNOSTIC TESTING:        < from: Xray Chest 1 View- PORTABLE-Urgent (19 @ 14:17) >  Stable chest.  Negative for activecardiopulmonary disease.      < from: CT Chest No Cont (19 @ 20:18) >  IMPRESSION:Small bilateral pleural effusions, left greater than right.    Interlobular septal thickening representing pulmonary edema.    Small pericardial effusion.    Coronary artery atherosclerotic disease.    < from: Transthoracic Echocardiogram (19 @ 10:32) >  Mitral Valve: Mitral annular calcification, otherwise  normal mitral valve. Mild-moderate mitral regurgitation.  Aortic Valve/Aorta: Normal trileaflet aortic valve.  Aortic Root: 3.3 cm.  Left Atrium: Moderately dilated left atrium.  LA volume  index =46 cc/m2.  Left Ventricle: Endocardium not well visualized; grossly  normal left ventricular systolic function. Normal left  ventricular internal dimensions and wall thicknesses.  Right Heart: Normal right atrium. Normal right ventricular  size and function. Normal tricuspid valve. Minimal  tricuspid regurgitation. Normal pulmonic valve. No pulmonic  regurgitation.  Pericardium/Pleura: Small pericardial effusion.  ------------------------------------------------------------------------  Conclusions:  1. Endocardium not well visualized; grossly normal left  ventricular systolic function.  2. Normal right ventricular size and function.  3. Normal tricuspid valve. Minimal tricuspid regurgitation.  4. Small pericardial effusion. (2019 17:35)    BP(mean): --  RR: 18 (2019 18:20) (18 - 20)  SpO2: 98% (2019 18:20) (96% - 98%)  PSTANDING):  apixaban 5 milliGRAM(s) Oral two times a day  dextrose 5%. 1000 milliLiter(s) (50 mL/Hr) IV Continuous <Continuous>  dextrose 50% Injectable 12.5 Gram(s) IV Push once  dextrose 50% Injectable 25 Gram(s) IV Push once  dextrose 50% Injectable 25 Gram(s) IV Push once  finasteride 5 milliGRAM(s) Oral daily  folic acid 1 milliGRAM(s) Oral daily  gabapentin 300 milliGRAM(s) Oral two times a day  insulin lispro (HumaLOG) corrective regimen sliding scale   SubCutaneous three times a day before meals  metoprolol tartrate 50 milliGRAM(s) Oral two times a day  pantoprazole    Tablet 40 milliGRAM(s) Oral before breakfast  simvastatin 40 milliGRAM(s) Oral at bedtime  sodium chloride 0.9%. 1000 milliLiter(s) (60 mL/Hr) IV Continuous <Continuous>  trimethoprim   80 mG/sulfamethoxazole 400 mG 1 Tablet(s) Oral daily    MEDICATIONS  (PRN):  dextrose 40% Gel 15 Gram(s) Oral once PRN Blood Glucose LESS THAN 70 milliGRAM(s)/deciliter  glucagon  Injectable 1 milliGRAM(s) IntraMuscular once PRN Glucose LESS THAN 70 milligrams/deciliter    LABS:                        9.5    5.78  )-----------( 185      ( 2019 14:12 )             27.0         130<L>  |  93<L>  |  45<H>  ----------------------------<  260<H>  5.4<H>   |  19<L>  |  1.10    Ca    9.1      2019 14:12    TPro  5.9<L>  /  Alb  3.5  /  TBili  0.5  /  DBili  x   /  AST  19  /  ALT  16  /  AlkPhos  54      PT/INR - ( 2019 14:12 )   PT: 18.1 sec;   INR: 1.55 ratio         PTT - ( 2019 14:12 )  PTT:31.5 sec      Urinalysis Basic - ( 2019 18:27 )    Color: Light Yellow / Appearance: Clear / S.018 / pH: x  Gluc: x / Ketone: Negative  / Bili: Negative / Urobili: Negative   Blood: x / Protein: Negative / Nitrite: Negative   Leuk Esterase: Negative / RBC: 5 /hpf / WBC 1 /HPF   Sq Epi: x / Non Sq Epi: 1 /hpf / Bacteria: Negative            Hemoglobin A1C, Whole Blood: 7.3 % ( @ 09:05)    Thyroid Stimulating Hormone, Serum: 1.25 uIU/mL ( @ 08:51)          Consultant(s) Notes Reviewed:      Care Discussed with Consultants/Other Providers:  Plan:

## 2019-11-22 NOTE — ED ADULT NURSE NOTE - OBJECTIVE STATEMENT
84 y.o male pmh DM,, Afib, lymphoma s.p chemo in july presenting to ED from home accompanied by his spouse and family c/o elevated HR and low bp reading on at home bp machine this am accompanied by dizziness. wife states she called cardiologist who suggested pt come to ED for admission and full cardiac work up. denies any other additional complaints of pain or discomfort. verbalizes feeling generalized weakness over the passed few days. no cough, fever, chills, sick contacts, recent travel, palpitations, sob, or cp. VS stable. labs and line obtained. pending admission. safety and fall precautions maintained, call bell at the bedside and within reach.

## 2019-11-22 NOTE — PHYSICAL THERAPY INITIAL EVALUATION ADULT - PERTINENT HX OF CURRENT PROBLEM, REHAB EVAL
85 yo M recent admission for disseminated herpes zoster, p/w high HR and low BP today. pts wife states pt was c/o dizziness this am, she took his BP today which was low in the 80s and HR was high in the 120s, checked it again and BP was lower, called cardiologist and told him to come to the ED. XRay Chest 11/22: Stable chest. Negative for active cardiopulmonary disease.

## 2019-11-22 NOTE — ED PROVIDER NOTE - PROGRESS NOTE DETAILS
spoke to pts PMD Dr. Nieves, recommends admission to his service, pt has not taking metoprolol properly. -RAQUEL BenitezC

## 2019-11-22 NOTE — H&P ADULT - HISTORY OF PRESENT ILLNESS
CHIEF COMPLAINT:Patient is a 84y old  Male who presents with a chief complaint of hypotension/tachycardia.    HPI:   85 yo male with pmh DM,, Afib, lymphoma s.p chemo in july, recent admission for disseminated herpes zoster here for concern og high HR and low BP today. pts wife states pt was c/o dizziness this am, she took his BP today which was low in the 80s and HR was high in the 120s, checked it again and BP was lower , called cardiologist and told him to come to the ED. Pt otherwise with no complaints, has been fatigued since hospitalization and lethargic, otherwise no cough, congestion, abdominal pain, fevers, chills, nausea, vomiting, dysuria.    PAST MEDICAL & SURGICAL HISTORY:  Atrial fibrillation  Anemia  DM2 (diabetes mellitus, type 2)  Osteoporosis  GERD (gastroesophageal reflux disease)  Benign prostatic hypertrophy  HTN (hypertension)  No significant past surgical history      MEDICATIONS  (STANDING):  noted    MEDICATIONS  (PRN):      FAMILY HISTORY:      SOCIAL HISTORY:    [ ] Non-smoker  [ ] Smoker  [ ] Alcohol    Allergies    No Known Allergies    Intolerances    	    REVIEW OF SYSTEMS:  CONSTITUTIONAL: No fever, weight loss, or fatigue  EYES: No eye pain, visual disturbances, or discharge  ENT:  No difficulty hearing, tinnitus, vertigo; No sinus or throat pain  NECK: No pain or stiffness  RESPIRATORY: No cough, wheezing, chills or hemoptysis; + Shortness of Breath  CARDIOVASCULAR: No chest pain,+ palpitations, no passing, or leg swelling  GASTROINTESTINAL: No abdominal or epigastric pain. No nausea, vomiting, or hematemesis; No diarrhea or constipation. No melena or hematochezia.  GENITOURINARY: No dysuria, frequency, hematuria, or incontinence  NEUROLOGICAL: No headaches, memory loss, loss of strength, numbness, or tremors, +dizziness  SKIN: No itching, burning, rashes, or lesions   LYMPH Nodes: No enlarged glands  ENDOCRINE: No heat or cold intolerance; No hair loss  MUSCULOSKELETAL: No joint pain or swelling; No muscle, back, or extremity pain  PSYCHIATRIC: No depression, anxiety, mood swings, or difficulty sleeping  HEME/LYMPH: No easy bruising, or bleeding gums  ALLERGY AND IMMUNOLOGIC: No hives or eczema	    [ ] All others negative	  [ ] Unable to obtain    PHYSICAL EXAM:  T(C): 36.7 (11-22-19 @ 16:30), Max: 36.7 (11-22-19 @ 16:30)  HR: 105 (11-22-19 @ 16:30) (100 - 105)  BP: 109/63 (11-22-19 @ 16:30) (105/65 - 109/63)  RR: 20 (11-22-19 @ 16:30) (20 - 20)  SpO2: 97% (11-22-19 @ 16:30) (96% - 97%)  Wt(kg): --  I&O's Summary      Appearance: Normal	  HEENT:   Normal oral mucosa, PERRL, EOMI	  Lymphatic: No lymphadenopathy  Cardiovascular: Normal S1 S2, No JVD, +murmurs, No edema  Respiratory: Lungs clear to auscultation	  Psychiatry: A & O x 3, Mood & affect appropriate  Gastrointestinal:  Soft, Non-tender, + BS	  Skin: No rashes, No ecchymoses, No cyanosis	  Neurologic: Non-focal  Extremities: Normal range of motion, No clubbing, cyanosis or edema  Vascular: Peripheral pulses palpable 2+ bilaterally    TELEMETRY: 	    ECG:  	  RADIOLOGY:  OTHER: 	  	  LABS:	 	    CARDIAC MARKERS:                              9.5    5.78  )-----------( 185      ( 22 Nov 2019 14:12 )             27.0     11-22    130<L>  |  93<L>  |  45<H>  ----------------------------<  260<H>  5.4<H>   |  19<L>  |  1.10    Ca    9.1      22 Nov 2019 14:12    TPro  5.9<L>  /  Alb  3.5  /  TBili  0.5  /  DBili  x   /  AST  19  /  ALT  16  /  AlkPhos  54  11-22    proBNP:   Lipid Profile:   HgA1c:   TSH:   PT/INR - ( 22 Nov 2019 14:12 )   PT: 18.1 sec;   INR: 1.55 ratio         PTT - ( 22 Nov 2019 14:12 )  PTT:31.5 sec    PREVIOUS DIAGNOSTIC TESTING:        < from: Xray Chest 1 View- PORTABLE-Urgent (11.22.19 @ 14:17) >  Stable chest.  Negative for activecardiopulmonary disease.      < from: CT Chest No Cont (06.02.19 @ 20:18) >  IMPRESSION:Small bilateral pleural effusions, left greater than right.    Interlobular septal thickening representing pulmonary edema.    Small pericardial effusion.    Coronary artery atherosclerotic disease.    < from: Transthoracic Echocardiogram (06.05.19 @ 10:32) >  Mitral Valve: Mitral annular calcification, otherwise  normal mitral valve. Mild-moderate mitral regurgitation.  Aortic Valve/Aorta: Normal trileaflet aortic valve.  Aortic Root: 3.3 cm.  Left Atrium: Moderately dilated left atrium.  LA volume  index =46 cc/m2.  Left Ventricle: Endocardium not well visualized; grossly  normal left ventricular systolic function. Normal left  ventricular internal dimensions and wall thicknesses.  Right Heart: Normal right atrium. Normal right ventricular  size and function. Normal tricuspid valve. Minimal  tricuspid regurgitation. Normal pulmonic valve. No pulmonic  regurgitation.  Pericardium/Pleura: Small pericardial effusion.  ------------------------------------------------------------------------  Conclusions:  1. Endocardium not well visualized; grossly normal left  ventricular systolic function.  2. Normal right ventricular size and function.  3. Normal tricuspid valve. Minimal tricuspid regurgitation.  4. Small pericardial effusion.

## 2019-11-22 NOTE — ED ADULT NURSE NOTE - NSIMPLEMENTINTERV_GEN_ALL_ED
Implemented All Fall with Harm Risk Interventions:  [place] to call system. Call bell, personal items and telephone within reach. Instruct patient to call for assistance. Room bathroom lighting operational. Non-slip footwear when patient is off stretcher. Physically safe environment: no spills, clutter or unnecessary equipment. Stretcher in lowest position, wheels locked, appropriate side rails in place. Provide visual cue, wrist band, yellow gown, etc. Monitor gait and stability. Monitor for mental status changes and reorient to person, place, and time. Review medications for side effects contributing to fall risk. Reinforce activity limits and safety measures with patient and family. Provide visual clues: red socks.

## 2019-11-22 NOTE — ED PROVIDER NOTE - ATTENDING CONTRIBUTION TO CARE
84y M hx of anemia, AFib, dm, gerd, htn, BPH, lymphoma, recent hospitalization for disseminated zoster which affected the L eye, was DC from rehab at [facility] yesterday and this AM wife noted that he has had persistent fatigue, low energy since hospitalization. Today when she went to get him out of bed he felt dizzy and she checked his BP and it was low, laid him back down and then he tried to get up again and BP was low again. States on Bactrim daily for chronic UTIs. Also on Metoprolol 100 QAm and 75QPM which he took today. No fevers, bleeding, cp, palp, sob, abd pain. On exam frail elderly male, NAD, drowsy but awake and alert, irreg irreg, lungs cta bl, abd soft ntnd, ext wwp, skin with crusted lesions to chest/back/L face in V1/V2 distribution, no vesicles. GRAHAM. Likely med related vs dehydration vs sepsis possibly from urine source. Check labs, orthostatics, EKG, UA, ccm.

## 2019-11-23 LAB
ALBUMIN SERPL ELPH-MCNC: 3.5 G/DL — SIGNIFICANT CHANGE UP (ref 3.3–5)
ALP SERPL-CCNC: 51 U/L — SIGNIFICANT CHANGE UP (ref 40–120)
ALT FLD-CCNC: 12 U/L — SIGNIFICANT CHANGE UP (ref 10–45)
ANION GAP SERPL CALC-SCNC: 13 MMOL/L — SIGNIFICANT CHANGE UP (ref 5–17)
AST SERPL-CCNC: 9 U/L — LOW (ref 10–40)
BILIRUB SERPL-MCNC: 0.6 MG/DL — SIGNIFICANT CHANGE UP (ref 0.2–1.2)
BUN SERPL-MCNC: 28 MG/DL — HIGH (ref 7–23)
CALCIUM SERPL-MCNC: 9.2 MG/DL — SIGNIFICANT CHANGE UP (ref 8.4–10.5)
CHLORIDE SERPL-SCNC: 99 MMOL/L — SIGNIFICANT CHANGE UP (ref 96–108)
CO2 SERPL-SCNC: 21 MMOL/L — LOW (ref 22–31)
CREAT SERPL-MCNC: 0.98 MG/DL — SIGNIFICANT CHANGE UP (ref 0.5–1.3)
GLUCOSE BLDC GLUCOMTR-MCNC: 178 MG/DL — HIGH (ref 70–99)
GLUCOSE BLDC GLUCOMTR-MCNC: 269 MG/DL — HIGH (ref 70–99)
GLUCOSE BLDC GLUCOMTR-MCNC: 291 MG/DL — HIGH (ref 70–99)
GLUCOSE BLDC GLUCOMTR-MCNC: 314 MG/DL — HIGH (ref 70–99)
GLUCOSE BLDC GLUCOMTR-MCNC: 326 MG/DL — HIGH (ref 70–99)
GLUCOSE SERPL-MCNC: 211 MG/DL — HIGH (ref 70–99)
HCT VFR BLD CALC: 27.8 % — LOW (ref 39–50)
HGB BLD-MCNC: 9.5 G/DL — LOW (ref 13–17)
MCHC RBC-ENTMCNC: 32.8 PG — SIGNIFICANT CHANGE UP (ref 27–34)
MCHC RBC-ENTMCNC: 34.2 GM/DL — SIGNIFICANT CHANGE UP (ref 32–36)
MCV RBC AUTO: 95.9 FL — SIGNIFICANT CHANGE UP (ref 80–100)
PLATELET # BLD AUTO: 193 K/UL — SIGNIFICANT CHANGE UP (ref 150–400)
POTASSIUM SERPL-MCNC: 5 MMOL/L — SIGNIFICANT CHANGE UP (ref 3.5–5.3)
POTASSIUM SERPL-SCNC: 5 MMOL/L — SIGNIFICANT CHANGE UP (ref 3.5–5.3)
PROT SERPL-MCNC: 5.4 G/DL — LOW (ref 6–8.3)
RBC # BLD: 2.9 M/UL — LOW (ref 4.2–5.8)
RBC # FLD: 15.2 % — HIGH (ref 10.3–14.5)
SODIUM SERPL-SCNC: 133 MMOL/L — LOW (ref 135–145)
WBC # BLD: 5.5 K/UL — SIGNIFICANT CHANGE UP (ref 3.8–10.5)
WBC # FLD AUTO: 5.5 K/UL — SIGNIFICANT CHANGE UP (ref 3.8–10.5)

## 2019-11-23 PROCEDURE — 93010 ELECTROCARDIOGRAM REPORT: CPT

## 2019-11-23 RX ORDER — SODIUM CHLORIDE 9 MG/ML
1000 INJECTION INTRAMUSCULAR; INTRAVENOUS; SUBCUTANEOUS
Refills: 0 | Status: COMPLETED | OUTPATIENT
Start: 2019-11-23 | End: 2019-11-24

## 2019-11-23 RX ORDER — METOPROLOL TARTRATE 50 MG
25 TABLET ORAL ONCE
Refills: 0 | Status: COMPLETED | OUTPATIENT
Start: 2019-11-23 | End: 2019-11-23

## 2019-11-23 RX ORDER — METOPROLOL TARTRATE 50 MG
50 TABLET ORAL
Refills: 0 | Status: DISCONTINUED | OUTPATIENT
Start: 2019-11-23 | End: 2019-11-25

## 2019-11-23 RX ORDER — METOPROLOL TARTRATE 50 MG
75 TABLET ORAL
Refills: 0 | Status: DISCONTINUED | OUTPATIENT
Start: 2019-11-23 | End: 2019-11-25

## 2019-11-23 RX ORDER — INSULIN GLARGINE 100 [IU]/ML
4 INJECTION, SOLUTION SUBCUTANEOUS AT BEDTIME
Refills: 0 | Status: DISCONTINUED | OUTPATIENT
Start: 2019-11-23 | End: 2019-11-24

## 2019-11-23 RX ADMIN — Medication 50 MILLIGRAM(S): at 17:29

## 2019-11-23 RX ADMIN — Medication 25 MILLIGRAM(S): at 17:37

## 2019-11-23 RX ADMIN — Medication 1 APPLICATION(S): at 05:12

## 2019-11-23 RX ADMIN — FINASTERIDE 5 MILLIGRAM(S): 5 TABLET, FILM COATED ORAL at 12:03

## 2019-11-23 RX ADMIN — Medication 50 MILLIGRAM(S): at 05:11

## 2019-11-23 RX ADMIN — APIXABAN 5 MILLIGRAM(S): 2.5 TABLET, FILM COATED ORAL at 05:12

## 2019-11-23 RX ADMIN — Medication 1 TABLET(S): at 12:03

## 2019-11-23 RX ADMIN — Medication 1 MILLIGRAM(S): at 12:03

## 2019-11-23 RX ADMIN — Medication 2 DROP(S): at 05:12

## 2019-11-23 RX ADMIN — SODIUM CHLORIDE 60 MILLILITER(S): 9 INJECTION INTRAMUSCULAR; INTRAVENOUS; SUBCUTANEOUS at 13:08

## 2019-11-23 RX ADMIN — GABAPENTIN 300 MILLIGRAM(S): 400 CAPSULE ORAL at 17:29

## 2019-11-23 RX ADMIN — Medication 2 DROP(S): at 23:05

## 2019-11-23 RX ADMIN — Medication 1 APPLICATION(S): at 14:23

## 2019-11-23 RX ADMIN — APIXABAN 5 MILLIGRAM(S): 2.5 TABLET, FILM COATED ORAL at 17:29

## 2019-11-23 RX ADMIN — Medication 2 DROP(S): at 17:29

## 2019-11-23 RX ADMIN — Medication 1 APPLICATION(S): at 22:00

## 2019-11-23 RX ADMIN — INSULIN GLARGINE 4 UNIT(S): 100 INJECTION, SOLUTION SUBCUTANEOUS at 21:59

## 2019-11-23 RX ADMIN — Medication 4: at 12:03

## 2019-11-23 RX ADMIN — Medication 3: at 17:01

## 2019-11-23 RX ADMIN — Medication 2 DROP(S): at 12:03

## 2019-11-23 RX ADMIN — GABAPENTIN 300 MILLIGRAM(S): 400 CAPSULE ORAL at 05:11

## 2019-11-23 RX ADMIN — SIMVASTATIN 40 MILLIGRAM(S): 20 TABLET, FILM COATED ORAL at 21:59

## 2019-11-23 RX ADMIN — PANTOPRAZOLE SODIUM 40 MILLIGRAM(S): 20 TABLET, DELAYED RELEASE ORAL at 05:11

## 2019-11-23 RX ADMIN — Medication 1: at 08:16

## 2019-11-23 NOTE — PROGRESS NOTE ADULT - SUBJECTIVE AND OBJECTIVE BOX
CARDIOLOGY     PROGRESS  NOTE   ________________________________________________    CHIEF COMPLAINT:Patient is a 84y old  Male who presents with a chief complaint of hypotension/tachycardia (22 Nov 2019 19:38)  no complain.  	  REVIEW OF SYSTEMS:  CONSTITUTIONAL: No fever, weight loss, or fatigue  EYES: No eye pain, visual disturbances, or discharge  ENT:  No difficulty hearing, tinnitus, vertigo; No sinus or throat pain  NECK: No pain or stiffness  RESPIRATORY: No cough, wheezing, chills or hemoptysis; No Shortness of Breath  CARDIOVASCULAR: No chest pain, palpitations, passing out, dizziness, or leg swelling  GASTROINTESTINAL: No abdominal or epigastric pain. No nausea, vomiting, or hematemesis; No diarrhea or constipation. No melena or hematochezia.  GENITOURINARY: No dysuria, frequency, hematuria, or incontinence  NEUROLOGICAL: No headaches, memory loss, loss of strength, numbness, or tremors  SKIN: No itching, burning, rashes, or lesions   LYMPH Nodes: No enlarged glands  ENDOCRINE: No heat or cold intolerance; No hair loss  MUSCULOSKELETAL: No joint pain or swelling; No muscle, back, or extremity pain  PSYCHIATRIC: No depression, anxiety, mood swings, or difficulty sleeping  HEME/LYMPH: No easy bruising, or bleeding gums  ALLERGY AND IMMUNOLOGIC: No hives or eczema	    [ ] All others negative	  [ ] Unable to obtain    PHYSICAL EXAM:  T(C): 36.9 (11-23-19 @ 06:09), Max: 37.1 (11-23-19 @ 04:08)  HR: 104 (11-23-19 @ 06:09) (96 - 110)  BP: 122/66 (11-23-19 @ 06:09) (102/67 - 122/66)  RR: 18 (11-23-19 @ 06:09) (18 - 20)  SpO2: 97% (11-23-19 @ 06:09) (96% - 98%)  Wt(kg): --  I&O's Summary    22 Nov 2019 07:01  -  23 Nov 2019 07:00  --------------------------------------------------------  IN: 240 mL / OUT: 650 mL / NET: -410 mL        Appearance: Normal	  HEENT:   Normal oral mucosa, PERRL, EOMI	  Lymphatic: No lymphadenopathy  Cardiovascular: Normal S1 S2, No JVD, + murmurs, No edema  Respiratory: Lungs clear to auscultation	  Psychiatry: A & O x 3, Mood & affect appropriate  Gastrointestinal:  Soft, Non-tender, + BS	  Skin: No rashes, No ecchymoses, No cyanosis	  Neurologic: Non-focal  Extremities: Normal range of motion, No clubbing, cyanosis or edema  Vascular: Peripheral pulses palpable 2+ bilaterally    MEDICATIONS  (STANDING):  apixaban 5 milliGRAM(s) Oral two times a day  dextrose 5%. 1000 milliLiter(s) (50 mL/Hr) IV Continuous <Continuous>  dextrose 50% Injectable 12.5 Gram(s) IV Push once  dextrose 50% Injectable 25 Gram(s) IV Push once  dextrose 50% Injectable 25 Gram(s) IV Push once  erythromycin   Ointment 1 Application(s) Left EYE three times a day  finasteride 5 milliGRAM(s) Oral daily  folic acid 1 milliGRAM(s) Oral daily  gabapentin 300 milliGRAM(s) Oral two times a day  insulin lispro (HumaLOG) corrective regimen sliding scale   SubCutaneous three times a day before meals  metoprolol tartrate 50 milliGRAM(s) Oral two times a day  pantoprazole    Tablet 40 milliGRAM(s) Oral before breakfast  prednisoLONE acetate 1% Suspension 2 Drop(s) Left EYE four times a day  simvastatin 40 milliGRAM(s) Oral at bedtime  sodium chloride 0.9%. 1000 milliLiter(s) (60 mL/Hr) IV Continuous <Continuous>  trimethoprim   80 mG/sulfamethoxazole 400 mG 1 Tablet(s) Oral daily      TELEMETRY: 	    ECG:  	  RADIOLOGY:  OTHER: 	  	  LABS:	 	    CARDIAC MARKERS:                                9.5    5.78  )-----------( 185      ( 22 Nov 2019 14:12 )             27.0     11-23    133<L>  |  99  |  28<H>  ----------------------------<  211<H>  5.0   |  21<L>  |  0.98    Ca    9.2      23 Nov 2019 06:04    TPro  5.4<L>  /  Alb  3.5  /  TBili  0.6  /  DBili  x   /  AST  9<L>  /  ALT  12  /  AlkPhos  51  11-23    proBNP:   Lipid Profile:   HgA1c: Hemoglobin A1C, Whole Blood: 7.3 % (11-06 @ 09:05)    TSH: Thyroid Stimulating Hormone, Serum: 1.25 uIU/mL (11-06 @ 08:51)    PT/INR - ( 22 Nov 2019 14:12 )   PT: 18.1 sec;   INR: 1.55 ratio         PTT - ( 22 Nov 2019 14:12 )  PTT:31.5 sec      Assessment and plan  ---------------------------  85 yo male with pmh DM,, Afib, lymphoma s.p chemo in july, recent admission for disseminated herpes zoster here for concern og high HR and low BP today. pts wife states pt was c/o dizziness this am, she took his BP today which was low in the 80s and HR was high in the 120s, checked it again and BP was lower , called cardiologist and told him to come to the ED. Pt otherwise with no complaints, has been fatigued since hospitalization and lethargic, otherwise no cough, congestion, abdominal pain, fevers, chills, nausea, vomiting, dysuria.  hypotension/ Tachycardia r/o sepsis. dehydration  check cultures   no abx yet  ivf gentle hydration  decrease beta blocker  tele  ac continue  hold lisinopril for now  physical therapy

## 2019-11-23 NOTE — PROGRESS NOTE ADULT - ASSESSMENT
84  yr  w/        HTN,   HLD,    DM (glipizide),   CKD. , CAD  on ct, . AF  on eliquis,  diastolic  chf       marginal  zone lymphoma ,    s/p chemo by dr brown,  july/ 2019/   none  recently,  , not on chemo at present   .  h/o  anemia/. seen by gi on past visit,           s/p  recent   h  zoster/  with crusted lesions,  ,  face / V1  distrubution, trigeminal  nerve        admitted with hypotension .  sbp  in 80/s  and tachycardia   iv hydration  orthostatics    BB  dose lowered.  with improvement in bp  DM,  follow fs   AF , on   asa/ Eliquis/  lopressor  bid/  lisinopril/ echo  ef  55  chronic  anemia     h/o   chronic  hyponatremia.,   sodium of  133   rpt echo/  echo of  6/18. with small pericardial effusion  PT  eval         < from: CT Chest No Cont (06.02.19 @ 20:18) >  IMPRESSION:Small bilateral pleural effusions, left greater than right.  Interlobular septal thickening representing pulmonary edema.  Small pericarial effusion.  Coronary artery atherosclerotic disease.  < end of copied text ><     from: Transthoracic Echocardiogram (06.05.19 @ 10:32) >  onclusions:  1. Endocardium not well visualized; grossly normal left  ventricular systolic function.  2. Normal right ventricular size and function.  3. Normal tricuspid valve. Minimal tricuspid regurgitation.  4. Small pericardial effusion.  *** No previous Echo exam.  < end of copied text >

## 2019-11-23 NOTE — CHART NOTE - NSCHARTNOTEFT_GEN_A_CORE
Pt's HR increased to 140 -160s with activity this evening. Pt asymptomatic. With rest HR improved to 120s. Will give an extra dose of lopressor of 25mg along with the 50mg this evening    [id number]

## 2019-11-23 NOTE — PROGRESS NOTE ADULT - SUBJECTIVE AND OBJECTIVE BOX
sbp ha s improved  no  compalints  REVIEW OF SYSTEMS:  GEN: no fever,    no chills  RESP: no SOB,   no cough  CVS: no chest pain,   no palpitations  GI: no abdominal pain,   no nausea,   no vomiting,   no constipation,   no diarrhea  : no dysuria,   no frequency  NEURO: no headache,   no dizziness  PSYCH: no depression,   not anxious  Derm : no rash    MEDICATIONS  (STANDING):  apixaban 5 milliGRAM(s) Oral two times a day  dextrose 5%. 1000 milliLiter(s) (50 mL/Hr) IV Continuous <Continuous>  dextrose 50% Injectable 12.5 Gram(s) IV Push once  dextrose 50% Injectable 25 Gram(s) IV Push once  dextrose 50% Injectable 25 Gram(s) IV Push once  erythromycin   Ointment 1 Application(s) Left EYE three times a day  finasteride 5 milliGRAM(s) Oral daily  folic acid 1 milliGRAM(s) Oral daily  gabapentin 300 milliGRAM(s) Oral two times a day  insulin lispro (HumaLOG) corrective regimen sliding scale   SubCutaneous three times a day before meals  metoprolol tartrate 50 milliGRAM(s) Oral two times a day  pantoprazole    Tablet 40 milliGRAM(s) Oral before breakfast  prednisoLONE acetate 1% Suspension 2 Drop(s) Left EYE four times a day  simvastatin 40 milliGRAM(s) Oral at bedtime  sodium chloride 0.9%. 1000 milliLiter(s) (60 mL/Hr) IV Continuous <Continuous>  trimethoprim   80 mG/sulfamethoxazole 400 mG 1 Tablet(s) Oral daily    MEDICATIONS  (PRN):  dextrose 40% Gel 15 Gram(s) Oral once PRN Blood Glucose LESS THAN 70 milliGRAM(s)/deciliter  glucagon  Injectable 1 milliGRAM(s) IntraMuscular once PRN Glucose LESS THAN 70 milligrams/deciliter      Vital Signs Last 24 Hrs  T(C): 36.9 (2019 06:09), Max: 37.1 (2019 04:08)  T(F): 98.4 (2019 06:09), Max: 98.8 (2019 04:08)  HR: 104 (2019 06:09) (96 - 110)  BP: 122/66 (2019 06:09) (102/67 - 122/66)  BP(mean): --  RR: 18 (2019 06:09) (18 - 20)  SpO2: 97% (2019 06:09) (96% - 98%)  CAPILLARY BLOOD GLUCOSE      POCT Blood Glucose.: 178 mg/dL (2019 08:07)  POCT Blood Glucose.: 223 mg/dL (2019 23:40)    I&O's Summary    2019 07:01  -  2019 07:00  --------------------------------------------------------  IN: 240 mL / OUT: 650 mL / NET: -410 mL        PHYSICAL EXAM:  HEAD:  Atraumatic, Normocephalic  NECK: Supple, No   JVD  CHEST/LUNG:   no     rales,     no,    rhonchi  HEART: Regular rate and rhythm;         murmur  ABDOMEN: Soft, Nontender, ;   EXTREMITIES:   no     edema  NEUROLOGY:  alert    LABS:                        9.5    5.78  )-----------( 185      ( 2019 14:12 )             27.0     11    133<L>  |  99  |  28<H>  ----------------------------<  211<H>  5.0   |  21<L>  |  0.98    Ca    9.2      2019 06:04    TPro  5.4<L>  /  Alb  3.5  /  TBili  0.6  /  DBili  x   /  AST  9<L>  /  ALT  12  /  AlkPhos  51  1123    PT/INR - ( 2019 14:12 )   PT: 18.1 sec;   INR: 1.55 ratio         PTT - ( 2019 14:12 )  PTT:31.5 sec      Urinalysis Basic - ( 2019 18:27 )    Color: Light Yellow / Appearance: Clear / S.018 / pH: x  Gluc: x / Ketone: Negative  / Bili: Negative / Urobili: Negative   Blood: x / Protein: Negative / Nitrite: Negative   Leuk Esterase: Negative / RBC: 5 /hpf / WBC 1 /HPF   Sq Epi: x / Non Sq Epi: 1 /hpf / Bacteria: Negative            Hemoglobin A1C, Whole Blood: 7.3 % ( @ 09:05)    Thyroid Stimulating Hormone, Serum: 1.25 uIU/mL ( @ 08:51)          Consultant(s) Notes Reviewed:      Care Discussed with Consultants/Other Providers:

## 2019-11-24 LAB
ANION GAP SERPL CALC-SCNC: 15 MMOL/L — SIGNIFICANT CHANGE UP (ref 5–17)
BUN SERPL-MCNC: 22 MG/DL — SIGNIFICANT CHANGE UP (ref 7–23)
CALCIUM SERPL-MCNC: 9.1 MG/DL — SIGNIFICANT CHANGE UP (ref 8.4–10.5)
CHLORIDE SERPL-SCNC: 97 MMOL/L — SIGNIFICANT CHANGE UP (ref 96–108)
CO2 SERPL-SCNC: 22 MMOL/L — SIGNIFICANT CHANGE UP (ref 22–31)
CREAT SERPL-MCNC: 1 MG/DL — SIGNIFICANT CHANGE UP (ref 0.5–1.3)
GLUCOSE BLDC GLUCOMTR-MCNC: 233 MG/DL — HIGH (ref 70–99)
GLUCOSE BLDC GLUCOMTR-MCNC: 288 MG/DL — HIGH (ref 70–99)
GLUCOSE BLDC GLUCOMTR-MCNC: 371 MG/DL — HIGH (ref 70–99)
GLUCOSE BLDC GLUCOMTR-MCNC: 391 MG/DL — HIGH (ref 70–99)
GLUCOSE SERPL-MCNC: 222 MG/DL — HIGH (ref 70–99)
MAGNESIUM SERPL-MCNC: 1.4 MG/DL — LOW (ref 1.6–2.6)
POTASSIUM SERPL-MCNC: 4.7 MMOL/L — SIGNIFICANT CHANGE UP (ref 3.5–5.3)
POTASSIUM SERPL-SCNC: 4.7 MMOL/L — SIGNIFICANT CHANGE UP (ref 3.5–5.3)
SODIUM SERPL-SCNC: 134 MMOL/L — LOW (ref 135–145)

## 2019-11-24 RX ORDER — DIGOXIN 250 MCG
0.25 TABLET ORAL DAILY
Refills: 0 | Status: DISCONTINUED | OUTPATIENT
Start: 2019-11-24 | End: 2019-11-27

## 2019-11-24 RX ORDER — INSULIN LISPRO 100/ML
VIAL (ML) SUBCUTANEOUS AT BEDTIME
Refills: 0 | Status: DISCONTINUED | OUTPATIENT
Start: 2019-11-24 | End: 2019-11-27

## 2019-11-24 RX ORDER — INSULIN GLARGINE 100 [IU]/ML
8 INJECTION, SOLUTION SUBCUTANEOUS AT BEDTIME
Refills: 0 | Status: DISCONTINUED | OUTPATIENT
Start: 2019-11-24 | End: 2019-11-25

## 2019-11-24 RX ADMIN — Medication 0.25 MILLIGRAM(S): at 17:00

## 2019-11-24 RX ADMIN — APIXABAN 5 MILLIGRAM(S): 2.5 TABLET, FILM COATED ORAL at 17:00

## 2019-11-24 RX ADMIN — Medication 2 DROP(S): at 23:11

## 2019-11-24 RX ADMIN — SODIUM CHLORIDE 60 MILLILITER(S): 9 INJECTION INTRAMUSCULAR; INTRAVENOUS; SUBCUTANEOUS at 06:03

## 2019-11-24 RX ADMIN — FINASTERIDE 5 MILLIGRAM(S): 5 TABLET, FILM COATED ORAL at 12:33

## 2019-11-24 RX ADMIN — Medication 1 APPLICATION(S): at 06:08

## 2019-11-24 RX ADMIN — Medication 1 MILLIGRAM(S): at 12:33

## 2019-11-24 RX ADMIN — Medication 50 MILLIGRAM(S): at 17:00

## 2019-11-24 RX ADMIN — Medication 1 APPLICATION(S): at 21:36

## 2019-11-24 RX ADMIN — SIMVASTATIN 40 MILLIGRAM(S): 20 TABLET, FILM COATED ORAL at 21:36

## 2019-11-24 RX ADMIN — Medication 2 DROP(S): at 17:00

## 2019-11-24 RX ADMIN — Medication 2 DROP(S): at 12:33

## 2019-11-24 RX ADMIN — Medication 1 TABLET(S): at 12:33

## 2019-11-24 RX ADMIN — PANTOPRAZOLE SODIUM 40 MILLIGRAM(S): 20 TABLET, DELAYED RELEASE ORAL at 06:08

## 2019-11-24 RX ADMIN — Medication 2: at 08:36

## 2019-11-24 RX ADMIN — GABAPENTIN 300 MILLIGRAM(S): 400 CAPSULE ORAL at 06:08

## 2019-11-24 RX ADMIN — Medication 1 APPLICATION(S): at 13:05

## 2019-11-24 RX ADMIN — Medication 3: at 22:10

## 2019-11-24 RX ADMIN — Medication 3: at 17:00

## 2019-11-24 RX ADMIN — Medication 2 DROP(S): at 06:08

## 2019-11-24 RX ADMIN — INSULIN GLARGINE 8 UNIT(S): 100 INJECTION, SOLUTION SUBCUTANEOUS at 21:36

## 2019-11-24 RX ADMIN — GABAPENTIN 300 MILLIGRAM(S): 400 CAPSULE ORAL at 17:00

## 2019-11-24 RX ADMIN — APIXABAN 5 MILLIGRAM(S): 2.5 TABLET, FILM COATED ORAL at 06:08

## 2019-11-24 RX ADMIN — Medication 75 MILLIGRAM(S): at 06:08

## 2019-11-24 RX ADMIN — Medication 5: at 12:34

## 2019-11-24 NOTE — PROGRESS NOTE ADULT - SUBJECTIVE AND OBJECTIVE BOX
afebrile/  no complaints    REVIEW OF SYSTEMS:  GEN: no fever,    no chills  RESP: no SOB,   no cough  CVS: no chest pain,   no palpitations  GI: no abdominal pain,   no nausea,   no vomiting,   no constipation,   no diarrhea  : no dysuria,   no frequency  NEURO: no headache,   no dizziness  PSYCH: no depression,   not anxious  Derm : no rash    MEDICATIONS  (STANDING):  apixaban 5 milliGRAM(s) Oral two times a day  dextrose 5%. 1000 milliLiter(s) (50 mL/Hr) IV Continuous <Continuous>  dextrose 50% Injectable 12.5 Gram(s) IV Push once  dextrose 50% Injectable 25 Gram(s) IV Push once  dextrose 50% Injectable 25 Gram(s) IV Push once  erythromycin   Ointment 1 Application(s) Left EYE three times a day  finasteride 5 milliGRAM(s) Oral daily  folic acid 1 milliGRAM(s) Oral daily  gabapentin 300 milliGRAM(s) Oral two times a day  insulin glargine Injectable (LANTUS) 4 Unit(s) SubCutaneous at bedtime  insulin lispro (HumaLOG) corrective regimen sliding scale   SubCutaneous three times a day before meals  metoprolol tartrate 50 milliGRAM(s) Oral <User Schedule>  metoprolol tartrate 75 milliGRAM(s) Oral <User Schedule>  pantoprazole    Tablet 40 milliGRAM(s) Oral before breakfast  prednisoLONE acetate 1% Suspension 2 Drop(s) Left EYE four times a day  simvastatin 40 milliGRAM(s) Oral at bedtime  trimethoprim   80 mG/sulfamethoxazole 400 mG 1 Tablet(s) Oral daily    MEDICATIONS  (PRN):  dextrose 40% Gel 15 Gram(s) Oral once PRN Blood Glucose LESS THAN 70 milliGRAM(s)/deciliter  glucagon  Injectable 1 milliGRAM(s) IntraMuscular once PRN Glucose LESS THAN 70 milligrams/deciliter      Vital Signs Last 24 Hrs  T(C): 37 (2019 04:08), Max: 37 (2019 04:08)  T(F): 98.6 (2019 04:08), Max: 98.6 (2019 04:08)  HR: 102 (2019 04:08) (98 - 133)  BP: 115/72 (2019 04:08) (109/69 - 137/77)  BP(mean): --  RR: 18 (2019 04:08) (18 - 18)  SpO2: 95% (2019 04:08) (95% - 97%)  CAPILLARY BLOOD GLUCOSE      POCT Blood Glucose.: 233 mg/dL (2019 08:02)  POCT Blood Glucose.: 291 mg/dL (2019 21:33)  POCT Blood Glucose.: 269 mg/dL (2019 16:28)  POCT Blood Glucose.: 314 mg/dL (2019 12:00)    I&O's Summary    2019 07:01  -  2019 07:00  --------------------------------------------------------  IN: 1180 mL / OUT: 2550 mL / NET: -1370 mL        PHYSICAL EXAM:  HEAD:  Atraumatic, Normocephalic  NECK: Supple, No   JVD  CHEST/LUNG:   no     rales,     no,    rhonchi  HEART: Regular rate and rhythm;         murmur  ABDOMEN: Soft, Nontender, ;   EXTREMITIES:    no    edema  NEUROLOGY:  alert    LABS:                        9.5    5.50  )-----------( 193      ( 2019 10:52 )             27.8     11-24    134<L>  |  97  |  22  ----------------------------<  222<H>  4.7   |  22  |  1.00    Ca    9.1      2019 07:00  Mg     1.4     11-24    TPro  5.4<L>  /  Alb  3.5  /  TBili  0.6  /  DBili  x   /  AST  9<L>  /  ALT  12  /  AlkPhos  51  11-23    PT/INR - ( 2019 14:12 )   PT: 18.1 sec;   INR: 1.55 ratio         PTT - ( 2019 14:12 )  PTT:31.5 sec      Urinalysis Basic - ( 2019 18:27 )    Color: Light Yellow / Appearance: Clear / S.018 / pH: x  Gluc: x / Ketone: Negative  / Bili: Negative / Urobili: Negative   Blood: x / Protein: Negative / Nitrite: Negative   Leuk Esterase: Negative / RBC: 5 /hpf / WBC 1 /HPF   Sq Epi: x / Non Sq Epi: 1 /hpf / Bacteria: Negative            Hemoglobin A1C, Whole Blood: 7.3 % ( @ 09:05)    Thyroid Stimulating Hormone, Serum: 1.25 uIU/mL ( @ 08:51)          Consultant(s) Notes Reviewed:      Care Discussed with Consultants/Other Providers:

## 2019-11-24 NOTE — PROGRESS NOTE ADULT - ASSESSMENT
84  yr  w/        HTN,   HLD,    DM (glipizide),   CKD. , CAD  on ct, . AF  on eliquis,  diastolic  chf       marginal  zone lymphoma ,    s/p chemo by dr brown,  july/ 2019/   none  recently,  , not on chemo at present   .  h/o  anemia/. seen by gi on past visit,           s/p  recent   h  zoster/  with crusted lesions,  ,  face / V1  distrubution, trigeminal  nerve        admitted with hypotension .  sbp  in 80/s  and tachycardia/  bp  ha s improved  since    BB  dose lowered.  with improvement in bp    DM,  follow fs  Lantus   increased     AF , on   asa/ Eliquis/  lopressor  bid/  lisinopril/ echo  ef  55  chronic  anemia     h/o   chronic  hyponatremia.,   sodium of  134   rpt echo  spoke  with wife         < from: CT Chest No Cont (06.02.19 @ 20:18) >  IMPRESSION:Small bilateral pleural effusions, left greater than right.  Interlobular septal thickening representing pulmonary edema.  Small pericarial effusion.  Coronary artery atherosclerotic disease.  < end of copied text ><     from: Transthoracic Echocardiogram (06.05.19 @ 10:32) >  onclusions:  1. Endocardium not well visualized; grossly normal left  ventricular systolic function.  2. Normal right ventricular size and function.  3. Normal tricuspid valve. Minimal tricuspid regurgitation.  4. Small pericardial effusion.  *** No previous Echo exam.  < end of copied text >

## 2019-11-24 NOTE — PROGRESS NOTE ADULT - SUBJECTIVE AND OBJECTIVE BOX
CARDIOLOGY     PROGRESS  NOTE   ________________________________________________    CHIEF COMPLAINT:Patient is a 84y old  Male who presents with a chief complaint of hypotension/tachycardia (24 Nov 2019 09:00)  doing better.  	  REVIEW OF SYSTEMS:  CONSTITUTIONAL: No fever, weight loss, or fatigue  EYES: No eye pain, visual disturbances, or discharge  ENT:  No difficulty hearing, tinnitus, vertigo; No sinus or throat pain  NECK: No pain or stiffness  RESPIRATORY: No cough, wheezing, chills or hemoptysis; No Shortness of Breath  CARDIOVASCULAR: No chest pain, palpitations, passing out, dizziness, or leg swelling  GASTROINTESTINAL: No abdominal or epigastric pain. No nausea, vomiting, or hematemesis; No diarrhea or constipation. No melena or hematochezia.  GENITOURINARY: No dysuria, frequency, hematuria, or incontinence  NEUROLOGICAL: No headaches, memory loss, loss of strength, numbness, or tremors  SKIN: No itching, burning, rashes, or lesions   LYMPH Nodes: No enlarged glands  ENDOCRINE: No heat or cold intolerance; No hair loss  MUSCULOSKELETAL: No joint pain or swelling; No muscle, back, or extremity pain  PSYCHIATRIC: No depression, anxiety, mood swings, or difficulty sleeping  HEME/LYMPH: No easy bruising, or bleeding gums  ALLERGY AND IMMUNOLOGIC: No hives or eczema	    [ ] All others negative	  [ ] Unable to obtain    PHYSICAL EXAM:  T(C): 37 (11-24-19 @ 04:08), Max: 37 (11-24-19 @ 04:08)  HR: 102 (11-24-19 @ 04:08) (98 - 133)  BP: 115/72 (11-24-19 @ 04:08) (109/69 - 137/77)  RR: 18 (11-24-19 @ 04:08) (18 - 18)  SpO2: 95% (11-24-19 @ 04:08) (95% - 97%)  Wt(kg): --  I&O's Summary    23 Nov 2019 07:01  -  24 Nov 2019 07:00  --------------------------------------------------------  IN: 1180 mL / OUT: 2550 mL / NET: -1370 mL        Appearance: Normal	  HEENT:   Normal oral mucosa, PERRL, EOMI	  Lymphatic: No lymphadenopathy  Cardiovascular: Normal S1 S2, No JVD, + murmurs, No edema  Respiratory: Lungs clear to auscultation	  Psychiatry: A & O x 3, Mood & affect appropriate  Gastrointestinal:  Soft, Non-tender, + BS	  Skin: No rashes, No ecchymoses, No cyanosis	  Neurologic: Non-focal  Extremities: Normal range of motion, No clubbing, cyanosis or edema  Vascular: Peripheral pulses palpable 2+ bilaterally    MEDICATIONS  (STANDING):  apixaban 5 milliGRAM(s) Oral two times a day  dextrose 5%. 1000 milliLiter(s) (50 mL/Hr) IV Continuous <Continuous>  dextrose 50% Injectable 12.5 Gram(s) IV Push once  dextrose 50% Injectable 25 Gram(s) IV Push once  dextrose 50% Injectable 25 Gram(s) IV Push once  erythromycin   Ointment 1 Application(s) Left EYE three times a day  finasteride 5 milliGRAM(s) Oral daily  folic acid 1 milliGRAM(s) Oral daily  gabapentin 300 milliGRAM(s) Oral two times a day  insulin glargine Injectable (LANTUS) 8 Unit(s) SubCutaneous at bedtime  insulin lispro (HumaLOG) corrective regimen sliding scale   SubCutaneous three times a day before meals  metoprolol tartrate 50 milliGRAM(s) Oral <User Schedule>  metoprolol tartrate 75 milliGRAM(s) Oral <User Schedule>  pantoprazole    Tablet 40 milliGRAM(s) Oral before breakfast  prednisoLONE acetate 1% Suspension 2 Drop(s) Left EYE four times a day  simvastatin 40 milliGRAM(s) Oral at bedtime  trimethoprim   80 mG/sulfamethoxazole 400 mG 1 Tablet(s) Oral daily      TELEMETRY: 	    ECG:  	  RADIOLOGY:  OTHER: 	  	  LABS:	 	    CARDIAC MARKERS:                                9.5    5.50  )-----------( 193      ( 23 Nov 2019 10:52 )             27.8     11-24    134<L>  |  97  |  22  ----------------------------<  222<H>  4.7   |  22  |  1.00    Ca    9.1      24 Nov 2019 07:00  Mg     1.4     11-24    TPro  5.4<L>  /  Alb  3.5  /  TBili  0.6  /  DBili  x   /  AST  9<L>  /  ALT  12  /  AlkPhos  51  11-23    proBNP:   Lipid Profile:   HgA1c: Hemoglobin A1C, Whole Blood: 7.3 % (11-06 @ 09:05)    TSH: Thyroid Stimulating Hormone, Serum: 1.25 uIU/mL (11-06 @ 08:51)    PT/INR - ( 22 Nov 2019 14:12 )   PT: 18.1 sec;   INR: 1.55 ratio         PTT - ( 22 Nov 2019 14:12 )  PTT:31.5 sec      Assessment and plan  ---------------------------  85 yo male with pmh DM,, Afib, lymphoma s.p chemo in july, recent admission for disseminated herpes zoster here for concern og high HR and low BP today. pts wife states pt was c/o dizziness this am, she took his BP today which was low in the 80s and HR was high in the 120s, checked it again and BP was lower , called cardiologist and told him to come to the ED. Pt otherwise with no complaints, has been fatigued since hospitalization and lethargic, otherwise no cough, congestion, abdominal pain, fevers, chills, nausea, vomiting, dysuria.  hypotension/ Tachycardia r/o sepsis. dehydration  check cultures   no abx yet  ivf gentle hydration  decrease beta blocker  tele  ac continue  hold lisinopril for now  physical therapy  increase beta blocker for better hr control  will add digoxin in view of low bp

## 2019-11-25 DIAGNOSIS — E11.65 TYPE 2 DIABETES MELLITUS WITH HYPERGLYCEMIA: ICD-10-CM

## 2019-11-25 LAB
ANION GAP SERPL CALC-SCNC: 8 MMOL/L — SIGNIFICANT CHANGE UP (ref 5–17)
BUN SERPL-MCNC: 24 MG/DL — HIGH (ref 7–23)
CALCIUM SERPL-MCNC: 9.5 MG/DL — SIGNIFICANT CHANGE UP (ref 8.4–10.5)
CHLORIDE SERPL-SCNC: 98 MMOL/L — SIGNIFICANT CHANGE UP (ref 96–108)
CO2 SERPL-SCNC: 22 MMOL/L — SIGNIFICANT CHANGE UP (ref 22–31)
CREAT SERPL-MCNC: 0.9 MG/DL — SIGNIFICANT CHANGE UP (ref 0.5–1.3)
GLUCOSE BLDC GLUCOMTR-MCNC: 249 MG/DL — HIGH (ref 70–99)
GLUCOSE BLDC GLUCOMTR-MCNC: 284 MG/DL — HIGH (ref 70–99)
GLUCOSE BLDC GLUCOMTR-MCNC: 350 MG/DL — HIGH (ref 70–99)
GLUCOSE BLDC GLUCOMTR-MCNC: 434 MG/DL — HIGH (ref 70–99)
GLUCOSE BLDC GLUCOMTR-MCNC: 434 MG/DL — HIGH (ref 70–99)
GLUCOSE SERPL-MCNC: 298 MG/DL — HIGH (ref 70–99)
MAGNESIUM SERPL-MCNC: 1.5 MG/DL — LOW (ref 1.6–2.6)
POTASSIUM SERPL-MCNC: 4.6 MMOL/L — SIGNIFICANT CHANGE UP (ref 3.5–5.3)
POTASSIUM SERPL-SCNC: 4.6 MMOL/L — SIGNIFICANT CHANGE UP (ref 3.5–5.3)
SODIUM SERPL-SCNC: 128 MMOL/L — LOW (ref 135–145)

## 2019-11-25 PROCEDURE — 93306 TTE W/DOPPLER COMPLETE: CPT | Mod: 26

## 2019-11-25 RX ORDER — METOPROLOL TARTRATE 50 MG
100 TABLET ORAL DAILY
Refills: 0 | Status: DISCONTINUED | OUTPATIENT
Start: 2019-11-25 | End: 2019-11-27

## 2019-11-25 RX ORDER — INSULIN GLARGINE 100 [IU]/ML
12 INJECTION, SOLUTION SUBCUTANEOUS AT BEDTIME
Refills: 0 | Status: DISCONTINUED | OUTPATIENT
Start: 2019-11-25 | End: 2019-11-25

## 2019-11-25 RX ORDER — METOPROLOL TARTRATE 50 MG
75 TABLET ORAL AT BEDTIME
Refills: 0 | Status: DISCONTINUED | OUTPATIENT
Start: 2019-11-25 | End: 2019-11-27

## 2019-11-25 RX ORDER — INSULIN LISPRO 100/ML
VIAL (ML) SUBCUTANEOUS
Refills: 0 | Status: DISCONTINUED | OUTPATIENT
Start: 2019-11-25 | End: 2019-11-26

## 2019-11-25 RX ORDER — ACETAMINOPHEN 500 MG
650 TABLET ORAL ONCE
Refills: 0 | Status: COMPLETED | OUTPATIENT
Start: 2019-11-25 | End: 2019-11-25

## 2019-11-25 RX ORDER — INSULIN LISPRO 100/ML
4 VIAL (ML) SUBCUTANEOUS
Refills: 0 | Status: DISCONTINUED | OUTPATIENT
Start: 2019-11-25 | End: 2019-11-26

## 2019-11-25 RX ORDER — INSULIN GLARGINE 100 [IU]/ML
16 INJECTION, SOLUTION SUBCUTANEOUS AT BEDTIME
Refills: 0 | Status: DISCONTINUED | OUTPATIENT
Start: 2019-11-25 | End: 2019-11-26

## 2019-11-25 RX ADMIN — INSULIN GLARGINE 16 UNIT(S): 100 INJECTION, SOLUTION SUBCUTANEOUS at 22:00

## 2019-11-25 RX ADMIN — Medication 1 MILLIGRAM(S): at 12:10

## 2019-11-25 RX ADMIN — GABAPENTIN 300 MILLIGRAM(S): 400 CAPSULE ORAL at 05:07

## 2019-11-25 RX ADMIN — Medication 2 DROP(S): at 12:11

## 2019-11-25 RX ADMIN — Medication 2: at 08:29

## 2019-11-25 RX ADMIN — APIXABAN 5 MILLIGRAM(S): 2.5 TABLET, FILM COATED ORAL at 05:07

## 2019-11-25 RX ADMIN — Medication 1 TABLET(S): at 12:11

## 2019-11-25 RX ADMIN — Medication 2: at 21:59

## 2019-11-25 RX ADMIN — Medication 1 APPLICATION(S): at 14:20

## 2019-11-25 RX ADMIN — Medication 650 MILLIGRAM(S): at 10:40

## 2019-11-25 RX ADMIN — SIMVASTATIN 40 MILLIGRAM(S): 20 TABLET, FILM COATED ORAL at 22:00

## 2019-11-25 RX ADMIN — GABAPENTIN 300 MILLIGRAM(S): 400 CAPSULE ORAL at 17:15

## 2019-11-25 RX ADMIN — APIXABAN 5 MILLIGRAM(S): 2.5 TABLET, FILM COATED ORAL at 17:15

## 2019-11-25 RX ADMIN — Medication 0.25 MILLIGRAM(S): at 05:07

## 2019-11-25 RX ADMIN — Medication 1 APPLICATION(S): at 05:08

## 2019-11-25 RX ADMIN — Medication 6: at 12:11

## 2019-11-25 RX ADMIN — Medication 2 DROP(S): at 17:15

## 2019-11-25 RX ADMIN — FINASTERIDE 5 MILLIGRAM(S): 5 TABLET, FILM COATED ORAL at 12:11

## 2019-11-25 RX ADMIN — Medication 1 APPLICATION(S): at 22:00

## 2019-11-25 RX ADMIN — PANTOPRAZOLE SODIUM 40 MILLIGRAM(S): 20 TABLET, DELAYED RELEASE ORAL at 05:07

## 2019-11-25 RX ADMIN — Medication 75 MILLIGRAM(S): at 05:07

## 2019-11-25 RX ADMIN — Medication 100 MILLIGRAM(S): at 12:30

## 2019-11-25 RX ADMIN — Medication 3: at 17:16

## 2019-11-25 RX ADMIN — Medication 2 DROP(S): at 05:08

## 2019-11-25 RX ADMIN — Medication 650 MILLIGRAM(S): at 11:05

## 2019-11-25 NOTE — PROGRESS NOTE ADULT - SUBJECTIVE AND OBJECTIVE BOX
no  compalints    REVIEW OF SYSTEMS:  GEN: no fever,    no chills  RESP: no SOB,   no cough  CVS: no chest pain,   no palpitations  GI: no abdominal pain,   no nausea,   no vomiting,   no constipation,   no diarrhea  : no dysuria,   no frequency  NEURO: no headache,   no dizziness  PSYCH: no depression,   not anxious  Derm : no rash    MEDICATIONS  (STANDING):  apixaban 5 milliGRAM(s) Oral two times a day  dextrose 5%. 1000 milliLiter(s) (50 mL/Hr) IV Continuous <Continuous>  dextrose 50% Injectable 12.5 Gram(s) IV Push once  dextrose 50% Injectable 25 Gram(s) IV Push once  dextrose 50% Injectable 25 Gram(s) IV Push once  digoxin     Tablet 0.25 milliGRAM(s) Oral daily  erythromycin   Ointment 1 Application(s) Left EYE three times a day  finasteride 5 milliGRAM(s) Oral daily  folic acid 1 milliGRAM(s) Oral daily  gabapentin 300 milliGRAM(s) Oral two times a day  insulin glargine Injectable (LANTUS) 8 Unit(s) SubCutaneous at bedtime  insulin lispro (HumaLOG) corrective regimen sliding scale   SubCutaneous at bedtime  insulin lispro (HumaLOG) corrective regimen sliding scale   SubCutaneous three times a day before meals  metoprolol tartrate 100 milliGRAM(s) Oral daily  metoprolol tartrate 75 milliGRAM(s) Oral at bedtime  pantoprazole    Tablet 40 milliGRAM(s) Oral before breakfast  prednisoLONE acetate 1% Suspension 2 Drop(s) Left EYE four times a day  simvastatin 40 milliGRAM(s) Oral at bedtime  trimethoprim   80 mG/sulfamethoxazole 400 mG 1 Tablet(s) Oral daily    MEDICATIONS  (PRN):  dextrose 40% Gel 15 Gram(s) Oral once PRN Blood Glucose LESS THAN 70 milliGRAM(s)/deciliter  glucagon  Injectable 1 milliGRAM(s) IntraMuscular once PRN Glucose LESS THAN 70 milligrams/deciliter      Vital Signs Last 24 Hrs  T(C): 36.7 (25 Nov 2019 04:34), Max: 36.8 (24 Nov 2019 11:29)  T(F): 98.1 (25 Nov 2019 04:34), Max: 98.3 (24 Nov 2019 11:29)  HR: 111 (25 Nov 2019 04:34) (96 - 111)  BP: 122/72 (25 Nov 2019 04:34) (107/70 - 122/72)  BP(mean): --  RR: 18 (25 Nov 2019 04:34) (18 - 18)  SpO2: 98% (25 Nov 2019 04:34) (97% - 99%)  CAPILLARY BLOOD GLUCOSE      POCT Blood Glucose.: 249 mg/dL (25 Nov 2019 07:57)  POCT Blood Glucose.: 371 mg/dL (24 Nov 2019 21:38)  POCT Blood Glucose.: 288 mg/dL (24 Nov 2019 16:48)  POCT Blood Glucose.: 391 mg/dL (24 Nov 2019 12:01)    I&O's Summary    24 Nov 2019 07:01  -  25 Nov 2019 07:00  --------------------------------------------------------  IN: 960 mL / OUT: 1225 mL / NET: -265 mL        PHYSICAL EXAM:  HEAD:  Atraumatic, Normocephalic  NECK: Supple, No   JVD  CHEST/LUNG:   no     rales,     no,    rhonchi  HEART: Regular rate and rhythm;         murmur  ABDOMEN: Soft, Nontender, ;   EXTREMITIES:   no     edema  NEUROLOGY:  alert    LABS:                        9.5    5.50  )-----------( 193      ( 23 Nov 2019 10:52 )             27.8     11-25    128<L>  |  98  |  24<H>  ----------------------------<  298<H>  4.6   |  22  |  0.90    Ca    9.5      25 Nov 2019 05:29  Mg     1.5     11-25                    Hemoglobin A1C, Whole Blood: 7.3 % (11-06 @ 09:05)    Thyroid Stimulating Hormone, Serum: 1.25 uIU/mL (11-06 @ 08:51)          Consultant(s) Notes Reviewed:      Care Discussed with Consultants/Other Providers:

## 2019-11-25 NOTE — CHART NOTE - NSCHARTNOTEFT_GEN_A_CORE
Upon Nutritional Assessment by the Registered Dietitian your patient was determined to meet criteria / has evidence of the following diagnosis/diagnoses:          [ ]  Mild Protein Calorie Malnutrition        [x]  Moderate Protein Calorie Malnutrition        [ ] Severe Protein Calorie Malnutrition        [ ] Unspecified Protein Calorie Malnutrition        [ ] Underweight / BMI <19        [ ] Morbid Obesity / BMI > 40      Findings as based on:  [x] Comprehensive nutrition assessment - ~12% wt loss x <1 month, pt with variable PO intake  [x] Nutrition Focused Physical Exam - nutrition focused physical exam deferred at this time (pt eating lunch, revisited and sleeping soundly) - visually noted pt with moderate fat losses to orbital and buccal regions, mild muscle wasting to temple region  [ ] Other:       Nutrition Plan/Recommendations:    1) Continue current diet: consistent carbohydrate (evening snack) - monitor/adjust as needed   2) Recommend Glucerna 2x/day (285 kcal, 14.2 g protein in each) to optimize intake   3) Encouraged PO intake as tolerated and provided DM diet education, pt and wife amenable, made aware RD remains available      PROVIDER Section:     By signing this assessment you are acknowledging and agree with the diagnosis/diagnoses assigned by the Registered Dietitian    Comments:

## 2019-11-25 NOTE — CONSULT NOTE ADULT - SUBJECTIVE AND OBJECTIVE BOX
HPI:  CHIEF COMPLAINT:Patient is a 84y old  Male who presents with a chief complaint of hypotension/tachycardia.    HPI:   85 yo male with pmh DM,, Afib, lymphoma s.p chemo in july, recent admission for disseminated herpes zoster here for concern og high HR and low BP today. pts wife states pt was c/o dizziness this am, she took his BP today which was low in the 80s and HR was high in the 120s, checked it again and BP was lower , called cardiologist and told him to come to the ED. Pt otherwise with no complaints, has been fatigued since hospitalization and lethargic, otherwise no cough, congestion, abdominal pain, fevers, chills, nausea, vomiting, dysuria.      Admit Diagnosis  Weakness      ENDOCRINE HPI: 85 yo male with h/o Type 2 DM,, Afib, lymphoma s.p chemo in July, recent admission for disseminated herpes zoster re-admitted after a syncopal episode.    Patient with Type 2 DM treated with metformin 1,000 mg BID and glipizide 5 mg BID. Recently with prolonged febrile illness after a visit to [facility]. Patient recent HbA1c was 7.3%. He says FS before recent were 120-140 mg/dL.  Since admission he was noted with pericardial effusion, no evidence of infection, no steroids given. He has been on Lantus insulin 4-->8-->12 units and scale humalog (low) with hyperglycemia in the 200-400 mg/dL.  PO intake is good. Patient afebrile, normal renal function, WBC 5.5.      PAST MEDICAL & SURGICAL HISTORY:  Atrial fibrillation  Anemia  DM2 (diabetes mellitus, type 2)  Osteoporosis  GERD (gastroesophageal reflux disease)  Benign prostatic hypertrophy  HTN (hypertension)  No significant past surgical history      FAMILY HISTORY:      Social History:    Outpatient Medications:    MEDICATIONS  (STANDING):  apixaban 5 milliGRAM(s) Oral two times a day  dextrose 5%. 1000 milliLiter(s) (50 mL/Hr) IV Continuous <Continuous>  dextrose 50% Injectable 12.5 Gram(s) IV Push once  dextrose 50% Injectable 25 Gram(s) IV Push once  dextrose 50% Injectable 25 Gram(s) IV Push once  digoxin     Tablet 0.25 milliGRAM(s) Oral daily  erythromycin   Ointment 1 Application(s) Left EYE three times a day  finasteride 5 milliGRAM(s) Oral daily  folic acid 1 milliGRAM(s) Oral daily  gabapentin 300 milliGRAM(s) Oral two times a day  insulin glargine Injectable (LANTUS) 12 Unit(s) SubCutaneous at bedtime  insulin lispro (HumaLOG) corrective regimen sliding scale   SubCutaneous at bedtime  insulin lispro (HumaLOG) corrective regimen sliding scale   SubCutaneous three times a day before meals  metoprolol tartrate 100 milliGRAM(s) Oral daily  metoprolol tartrate 75 milliGRAM(s) Oral at bedtime  pantoprazole    Tablet 40 milliGRAM(s) Oral before breakfast  prednisoLONE acetate 1% Suspension 2 Drop(s) Left EYE four times a day  simvastatin 40 milliGRAM(s) Oral at bedtime  trimethoprim   80 mG/sulfamethoxazole 400 mG 1 Tablet(s) Oral daily    MEDICATIONS  (PRN):  dextrose 40% Gel 15 Gram(s) Oral once PRN Blood Glucose LESS THAN 70 milliGRAM(s)/deciliter  glucagon  Injectable 1 milliGRAM(s) IntraMuscular once PRN Glucose LESS THAN 70 milligrams/deciliter      Allergies    No Known Allergies    Intolerances        Review of Systems:  Constitutional: No fever, chills.  Eyes: blurry vision  Neuro: No tremors  HEENT: No pain  Cardiovascular: No chest pain, palpitations  Respiratory: SOB, no cough  GI: No nausea, vomiting, abdominal pain  : No dysuria  Skin: no rash  Psych: no depression  Endocrine: no polyuria, polydipsia  Hem/lymph: no swelling  Osteoporosis: no fractures    ALL OTHER SYSTEMS REVIEWED AND NEGATIVE      PHYSICAL EXAM:  VITALS: T(C): 36.9 (11-25-19 @ 11:25)  T(F): 98.4 (11-25-19 @ 11:25), Max: 98.4 (11-25-19 @ 11:25)  HR: 93 (11-25-19 @ 11:25) (93 - 111)  BP: 127/78 (11-25-19 @ 11:25) (114/70 - 127/78)  RR:  (18 - 18)  SpO2:  (97% - 98%)  GENERAL: NAD, well-developed  EYES: No proptosis, no lid lag, anicteric  HEENT:  Atraumatic, Normocephalic, moist mucous membranes  THYROID: Normal size, no palpable nodules  RESPIRATORY: Clear to auscultation, decreased breath sounds both bases. No rales, rhonchi, wheezing  CARDIOVASCULAR: Regular rate and rhythm; No murmurs; no peripheral edema  GI: Soft, nontender, non distended, normal bowel sounds  SKIN: Dry, intact, No rashes or lesions  MUSCULOSKELETAL: Full range of motion, normal strength  NEURO: sensation intact, extraocular movements intact, no tremor  PSYCH: Alert and oriented x 3, normal affect, normal mood    POCT Blood Glucose.: 284 mg/dL (11-25-19 @ 16:37)  POCT Blood Glucose.: 434 mg/dL (11-25-19 @ 12:06)  POCT Blood Glucose.: 434 mg/dL (11-25-19 @ 12:04)  POCT Blood Glucose.: 249 mg/dL (11-25-19 @ 07:57)  POCT Blood Glucose.: 371 mg/dL (11-24-19 @ 21:38)  POCT Blood Glucose.: 288 mg/dL (11-24-19 @ 16:48)  POCT Blood Glucose.: 391 mg/dL (11-24-19 @ 12:01)  POCT Blood Glucose.: 233 mg/dL (11-24-19 @ 08:02)  POCT Blood Glucose.: 291 mg/dL (11-23-19 @ 21:33)  POCT Blood Glucose.: 269 mg/dL (11-23-19 @ 16:28)  POCT Blood Glucose.: 314 mg/dL (11-23-19 @ 12:00)  POCT Blood Glucose.: 178 mg/dL (11-23-19 @ 08:07)  POCT Blood Glucose.: 223 mg/dL (11-22-19 @ 23:40)                            9.5    5.50  )-----------( 193      ( 23 Nov 2019 10:52 )             27.8       11-25    128<L>  |  98  |  24<H>  ----------------------------<  298<H>  4.6   |  22  |  0.90    Ca    9.5      25 Nov 2019 05:29  Mg     1.5     11-25        Thyroid Function Tests:  11-06 @ 08:51 TSH 1.25 FreeT4 -- T3 -- Anti TPO -- Anti Thyroglobulin Ab -- TSI --      Hemoglobin A1C, Whole Blood: 7.3 % <H> [4.0 - 5.6] (11-06-19 @ 09:05)          Radiology:

## 2019-11-25 NOTE — CHART NOTE - NSCHARTNOTEFT_GEN_A_CORE
MEDICINE NP NOTE  Spectra [id number]      Received call from radiology with echocardiogram results, pericardial effusion is larger compared to imaging that was done in 6/2019 but there is no concern for tamponade.  Discussed with Dr Qiu, no interventions at this time.  Attending will review in the morning, will continue to monitor.

## 2019-11-25 NOTE — PROGRESS NOTE ADULT - SUBJECTIVE AND OBJECTIVE BOX
CARDIOLOGY     PROGRESS  NOTE   ________________________________________________    CHIEF COMPLAINT:Patient is a 84y old  Male who presents with a chief complaint of hypotension/tachycardia (24 Nov 2019 11:19)  no complain.  	  REVIEW OF SYSTEMS:  CONSTITUTIONAL: No fever, weight loss, or fatigue  EYES: No eye pain, visual disturbances, or discharge  ENT:  No difficulty hearing, tinnitus, vertigo; No sinus or throat pain  NECK: No pain or stiffness  RESPIRATORY: No cough, wheezing, chills or hemoptysis; No Shortness of Breath  CARDIOVASCULAR: No chest pain, palpitations, passing out, dizziness, or leg swelling  GASTROINTESTINAL: No abdominal or epigastric pain. No nausea, vomiting, or hematemesis; No diarrhea or constipation. No melena or hematochezia.  GENITOURINARY: No dysuria, frequency, hematuria, or incontinence  NEUROLOGICAL: No headaches, memory loss, loss of strength, numbness, or tremors  SKIN: No itching, burning, rashes, or lesions   LYMPH Nodes: No enlarged glands  ENDOCRINE: No heat or cold intolerance; No hair loss  MUSCULOSKELETAL: No joint pain or swelling; No muscle, back, or extremity pain  PSYCHIATRIC: No depression, anxiety, mood swings, or difficulty sleeping  HEME/LYMPH: No easy bruising, or bleeding gums  ALLERGY AND IMMUNOLOGIC: No hives or eczema	    [ ] All others negative	  [ ] Unable to obtain    PHYSICAL EXAM:  T(C): 36.7 (11-25-19 @ 04:34), Max: 36.8 (11-24-19 @ 11:29)  HR: 111 (11-25-19 @ 04:34) (96 - 111)  BP: 122/72 (11-25-19 @ 04:34) (107/70 - 122/72)  RR: 18 (11-25-19 @ 04:34) (18 - 18)  SpO2: 98% (11-25-19 @ 04:34) (97% - 99%)  Wt(kg): --  I&O's Summary    24 Nov 2019 07:01  -  25 Nov 2019 07:00  --------------------------------------------------------  IN: 960 mL / OUT: 1225 mL / NET: -265 mL        Appearance: Normal	  HEENT:   Normal oral mucosa, PERRL, EOMI	  Lymphatic: No lymphadenopathy  Cardiovascular: Normal S1 S2, No JVD, + murmurs, No edema  Respiratory:rhonchi  Psychiatry: A & O x 3, Mood & affect appropriate  Gastrointestinal:  Soft, Non-tender, + BS	  Skin: No rashes, No ecchymoses, No cyanosis	  Neurologic: Non-focal  Extremities: Normal range of motion, No clubbing, cyanosis or edema  Vascular: Peripheral pulses palpable 2+ bilaterally    MEDICATIONS  (STANDING):  apixaban 5 milliGRAM(s) Oral two times a day  dextrose 5%. 1000 milliLiter(s) (50 mL/Hr) IV Continuous <Continuous>  dextrose 50% Injectable 12.5 Gram(s) IV Push once  dextrose 50% Injectable 25 Gram(s) IV Push once  dextrose 50% Injectable 25 Gram(s) IV Push once  digoxin     Tablet 0.25 milliGRAM(s) Oral daily  erythromycin   Ointment 1 Application(s) Left EYE three times a day  finasteride 5 milliGRAM(s) Oral daily  folic acid 1 milliGRAM(s) Oral daily  gabapentin 300 milliGRAM(s) Oral two times a day  insulin glargine Injectable (LANTUS) 8 Unit(s) SubCutaneous at bedtime  insulin lispro (HumaLOG) corrective regimen sliding scale   SubCutaneous at bedtime  insulin lispro (HumaLOG) corrective regimen sliding scale   SubCutaneous three times a day before meals  metoprolol tartrate 50 milliGRAM(s) Oral <User Schedule>  metoprolol tartrate 75 milliGRAM(s) Oral <User Schedule>  pantoprazole    Tablet 40 milliGRAM(s) Oral before breakfast  prednisoLONE acetate 1% Suspension 2 Drop(s) Left EYE four times a day  simvastatin 40 milliGRAM(s) Oral at bedtime  trimethoprim   80 mG/sulfamethoxazole 400 mG 1 Tablet(s) Oral daily      TELEMETRY: 	    ECG:  	  RADIOLOGY:  OTHER: 	  	  LABS:	 	    CARDIAC MARKERS:                                9.5    5.50  )-----------( 193      ( 23 Nov 2019 10:52 )             27.8     11-25    128<L>  |  98  |  24<H>  ----------------------------<  298<H>  4.6   |  22  |  0.90    Ca    9.5      25 Nov 2019 05:29  Mg     1.5     11-25      proBNP:   Lipid Profile:   HgA1c: Hemoglobin A1C, Whole Blood: 7.3 % (11-06 @ 09:05)    TSH: Thyroid Stimulating Hormone, Serum: 1.25 uIU/mL (11-06 @ 08:51)          Assessment and plan  ---------------------------  85 yo male with pmh DM,, Afib, lymphoma s.p chemo in july, recent admission for disseminated herpes zoster here for concern og high HR and low BP today. pts wife states pt was c/o dizziness this am, she took his BP today which was low in the 80s and HR was high in the 120s, checked it again and BP was lower , called cardiologist and told him to come to the ED. Pt otherwise with no complaints, has been fatigued since hospitalization and lethargic, otherwise no cough, congestion, abdominal pain, fevers, chills, nausea, vomiting, dysuria.  hypotension/ Tachycardia r/o sepsis. dehydration  check cultures   no abx yet  ivf gentle hydration  decrease beta blocker  tele  ac continue  hold lisinopril for now  physical therapy  increase beta blocker for better hr control  add iv dig dose for better hr control

## 2019-11-25 NOTE — CONSULT NOTE ADULT - ASSESSMENT
83 yo male with h/o Type 2 DM,, Afib, lymphoma s.p chemo in July, recent admission for disseminated herpes zoster re-admitted after a syncopal episode.    Patient with Type 2 DM treated with metformin 1,000 mg BID and glipizide 5 mg BID. Recently with prolonged febrile illness after a visit to [facility]. Patient recent HbA1c was 7.3%. He says FS before recent were 120-140 mg/dL.  Since admission he was noted with pericardial effusion, no evidence of infection, no steroids given. He has been on Lantus insulin 4-->8-->12 units and scale humalog (low) with hyperglycemia in the 200-400 mg/dL.  PO intake is good. Patient afebrile, normal renal function, WBC 5.5.    Patient with Type 2 DM acceptable control on two oral agents at home.  Here with no evidence for acute infection with significant hyperglycemia, no recent steroids, PO intake good.    Suggest:  Increase lantus to 16 units daily.  Add humalog 4 units per meal.  Low to mid scale humalog pre-meal scale.  F/U on that.

## 2019-11-25 NOTE — DIETITIAN INITIAL EVALUATION ADULT. - WEIGHT IN LBS
[facility] ID PROGRESS NOTE    Dagoberto Mason Patient Status:  Inpatient    1980 MRN [id number]   [facility] 2W/SW Attending Cr Franco, [address] Day [address] 4 PCP Michelle Aalmo. DO Rogelio     Subjective:  Awake, afebrile.  Has been to unspecified organism [facility])     Metastatic colon cancer to liver [facility])      ASSESSMENT:    Antibiotics: Meropenem, fluconazole, day 5  Vancomycin    41year old female with metastatic adenocarcinoma of the colon, perforated tranverse colonic tumor with a 147.2 120

## 2019-11-25 NOTE — DIETITIAN INITIAL EVALUATION ADULT. - PHYSICAL APPEARANCE
other (specify) Ht: 72 inches (182.88 cm) Wt: 147.2 pounds (66.8 kg) - 11/25 standing wt    BMI: 20 - based on 11/25 standing wt  IBW: 178 pounds +/-10% %IBW: 83%    Skin: no pressure injuries per flowsheets   Edema: no edema per flowsheets other (specify)/nutrition focused physical exam deferred at this time (pt eating lunch, revisited and sleeping soundly) - visually noted pt with moderate fat losses to orbital and buccal regions, mild muscle wasting to temple region

## 2019-11-25 NOTE — DIETITIAN INITIAL EVALUATION ADULT. - ADD RECOMMEND
1) Continue current diet: consistent carbohydrate (evening snack) - monitor/adjust as needed 2) Recommend Glucerna 2x/day (285 kcal, 14.2 g protein in each) to optimize intake 3) Encouraged PO intake as tolerated and provided DM diet education, pt and wife amenable, made aware RD remains available

## 2019-11-25 NOTE — DIETITIAN INITIAL EVALUATION ADULT. - PERTINENT LABORATORY DATA
Na 128, BUN 24  HbA1c 7.3 (11/6)   Glucose fingersticks: (11/25) [phone], (11/24) [phone], (11/23) [phone]

## 2019-11-25 NOTE — DIETITIAN INITIAL EVALUATION ADULT. - REASON INDICATOR FOR ASSESSMENT
Patient seen for BMI < 19 (of note, at current wt, BMI >19). Source: chart review, pt's wife at bedside. Pt is a 83 yo male with PMH of DM, afib, lymphoma (s/p chemo 7/2019), HTN, HLD, CKD, CAD, diastolic CHF, with recent admission for disseminated herpes zoster who presented with hypotension/tachycardia, admitted 11/22. Patient seen for BMI < 19 (of note, at current wt, BMI is 20). Source: chart review, pt's wife at bedside. Pt is a 85 yo male with PMH of DM, afib, lymphoma (s/p chemo 7/2019), HTN, HLD, CKD, CAD, diastolic CHF, with recent admission for disseminated herpes zoster who presented with hypotension/tachycardia, admitted 11/22.

## 2019-11-25 NOTE — PROGRESS NOTE ADULT - ASSESSMENT
84  yr  w/        HTN,   HLD,    DM (glipizide),   CKD. , CAD  on ct, . AF  on eliquis,  diastolic  chf       marginal  zone lymphoma ,    s/p chemo by dr brown,  july/ 2019/   none  recently,  , not on chemo at present   .  h/o  anemia/. seen by gi on past visit,           s/p  recent   h  zoster/  with crusted lesions,  ,  face / V1  distrubution, trigeminal  nerve        admitted with hypotension .  sbp  in 80/s  and tachycardia/  bp  ha s improved  since    BB  dose lowered.  with improvement in bp    DM,  follow fs  Lantus   increased     AF , on   asa/ Eliquis/  lopressor  bid/  lisinopril/ echo  ef  55  chronic  anemia     h/o   chronic  hyponatremia.,   sodium of  134  spoke  with wife/  pt ha s improved  pt to  ambulate         < from: CT Chest No Cont (06.02.19 @ 20:18) >  IMPRESSION:Small bilateral pleural effusions, left greater than right.  Interlobular septal thickening representing pulmonary edema.  Small pericarial effusion.  Coronary artery atherosclerotic disease.  < end of copied text ><     from: Transthoracic Echocardiogram (06.05.19 @ 10:32) >  onclusions:  1. Endocardium not well visualized; grossly normal left  ventricular systolic function.  2. Normal right ventricular size and function.  3. Normal tricuspid valve. Minimal tricuspid regurgitation.  4. Small pericardial effusion.  *** No previous Echo exam.  < end of copied text > 84  yr  w/        HTN,   HLD,    DM (glipizide),   CKD. , CAD  on ct, . AF  on eliquis,  diastolic  chf       marginal  zone lymphoma ,    s/p chemo by dr brown,  july/ 2019/   none  recently,  , not on chemo at present   .  h/o  anemia/. seen by gi on past visit,           s/p  recent   h  zoster/  with crusted lesions,  ,  face / V1  distrubution, trigeminal  nerve        admitted with hypotension .  sbp  in 80/s  and tachycardia/  bp  ha s improved  since    BB  dose lowered.  with improvement in bp    DM,  follow fs  Lantus   increased     AF , on   asa/ Eliquis/  lopressor  bid/  lisinopril/ echo  ef  55  chronic  anemia     h/o   chronic  hyponatremia.,   sodium of  134.  now  128  on fluid restriction  spoke  with wife/  pt ha s improved  pt to  ambulate         < from: CT Chest No Cont (06.02.19 @ 20:18) >  IMPRESSION:Small bilateral pleural effusions, left greater than right.  Interlobular septal thickening representing pulmonary edema.  Small pericarial effusion.  Coronary artery atherosclerotic disease.  < end of copied text ><     from: Transthoracic Echocardiogram (06.05.19 @ 10:32) >  onclusions:  1. Endocardium not well visualized; grossly normal left  ventricular systolic function.  2. Normal right ventricular size and function.  3. Normal tricuspid valve. Minimal tricuspid regurgitation.  4. Small pericardial effusion.  *** No previous Echo exam.  < end of copied text >

## 2019-11-25 NOTE — DIETITIAN INITIAL EVALUATION ADULT. - OTHER INFO
Per EMR, pt speaks Kazakh. Pt's wife at bedside, declined  services, spoke in English and provided translation for pt. Wife reports pt with improving appetite and intake, consuming meals from home + institutional meals. For lunch today, consumed 3 chicken legs, 1 cup vegetable soup, vegetables, 1 slice bread, and blueberries. Observed Glucerna oral nutrition supplement at bedside, wife reports pt consumes ~1/day. Denies nausea/vomiting/diarrhea/constipation, last BM yesterday (11/24) per pt. No difficulties chewing or swallowing reported. Confirmed NKFA.     Per wife, pt in rehab facility PTA, with variable intake, "sometimes he would eat, sometimes he wouldn't eat." At home, pt follows a consistent carbohydrate diet and avoids concentrated sweets (cakes, cookies, desserts) and sugar sweetened beverages. Pt's wife cooks a variety of foods for him. Per wife, pt "does not drink enough water and that is why they ended up here." Pt consuming ~1 Glucerna/day PTA, would like to receive during hospital stay. Pt's wife endorses pt with recent weight changes. Prior to last admission (~1 month ago), pt weighed ~165 pounds. At rehab facility, wife reports pt weighed ~140 pounds. Reports pt now with weight gain since admission, attributes to improving PO intake. Per pt's wife, pt checks Glucose fingersticks at home, usual ranges 120-130 (morning) and ~250 post-prandial. Wife reports pt's diabetes is medically managed (metformin and glipizide), and pt is not on insulin at home.      Encouraged PO intake and provided recommendations to optimize PO and protein intake. Discussed provision of supplemental calories, protein, and nutrients through oral nutrition supplement (Glucerna). Encouraged consumption of oral nutrition supplement (throughout the day, between meals, but focusing on food first. Provided consistent carbohydrate/DM diet education. Discussed carbohydrate counting and foods that have carbohydrates. Explained meal planning with diabetes and serving sizes of carbohydrates. Emphasized consumption of foods with fiber (ie whole grains) and protein with carbohydrates to stabilize blood sugar. Provided "Type 2 Diabetes Nutrition Therapy" handout. Per pt's wife's request, discussed fruit recommended on consistent carbohydrate diet. Emphasized the importance of adequate hydration. Pt amenable and made aware RD to remain available.

## 2019-11-26 ENCOUNTER — TRANSCRIPTION ENCOUNTER (OUTPATIENT)
Age: 84
End: 2019-11-26

## 2019-11-26 LAB
-  AMIKACIN: SIGNIFICANT CHANGE UP
-  AMIKACIN: SIGNIFICANT CHANGE UP
-  AMPICILLIN/SULBACTAM: SIGNIFICANT CHANGE UP
-  AMPICILLIN: SIGNIFICANT CHANGE UP
-  AZTREONAM: SIGNIFICANT CHANGE UP
-  AZTREONAM: SIGNIFICANT CHANGE UP
-  CEFAZOLIN: SIGNIFICANT CHANGE UP
-  CEFEPIME: SIGNIFICANT CHANGE UP
-  CEFEPIME: SIGNIFICANT CHANGE UP
-  CEFOXITIN: SIGNIFICANT CHANGE UP
-  CEFTAZIDIME: SIGNIFICANT CHANGE UP
-  CEFTRIAXONE: SIGNIFICANT CHANGE UP
-  CIPROFLOXACIN: SIGNIFICANT CHANGE UP
-  CIPROFLOXACIN: SIGNIFICANT CHANGE UP
-  GENTAMICIN: SIGNIFICANT CHANGE UP
-  GENTAMICIN: SIGNIFICANT CHANGE UP
-  IMIPENEM: SIGNIFICANT CHANGE UP
-  LEVOFLOXACIN: SIGNIFICANT CHANGE UP
-  LEVOFLOXACIN: SIGNIFICANT CHANGE UP
-  MEROPENEM: SIGNIFICANT CHANGE UP
-  MEROPENEM: SIGNIFICANT CHANGE UP
-  NITROFURANTOIN: SIGNIFICANT CHANGE UP
-  PIPERACILLIN/TAZOBACTAM: SIGNIFICANT CHANGE UP
-  PIPERACILLIN/TAZOBACTAM: SIGNIFICANT CHANGE UP
-  TOBRAMYCIN: SIGNIFICANT CHANGE UP
-  TOBRAMYCIN: SIGNIFICANT CHANGE UP
-  TRIMETHOPRIM/SULFAMETHOXAZOLE: SIGNIFICANT CHANGE UP
ANION GAP SERPL CALC-SCNC: 13 MMOL/L — SIGNIFICANT CHANGE UP (ref 5–17)
BUN SERPL-MCNC: 26 MG/DL — HIGH (ref 7–23)
CALCIUM SERPL-MCNC: 9.6 MG/DL — SIGNIFICANT CHANGE UP (ref 8.4–10.5)
CHLORIDE SERPL-SCNC: 97 MMOL/L — SIGNIFICANT CHANGE UP (ref 96–108)
CO2 SERPL-SCNC: 22 MMOL/L — SIGNIFICANT CHANGE UP (ref 22–31)
CREAT SERPL-MCNC: 0.92 MG/DL — SIGNIFICANT CHANGE UP (ref 0.5–1.3)
CULTURE RESULTS: SIGNIFICANT CHANGE UP
GLUCOSE BLDC GLUCOMTR-MCNC: 164 MG/DL — HIGH (ref 70–99)
GLUCOSE BLDC GLUCOMTR-MCNC: 290 MG/DL — HIGH (ref 70–99)
GLUCOSE BLDC GLUCOMTR-MCNC: 297 MG/DL — HIGH (ref 70–99)
GLUCOSE BLDC GLUCOMTR-MCNC: 382 MG/DL — HIGH (ref 70–99)
GLUCOSE BLDC GLUCOMTR-MCNC: 417 MG/DL — HIGH (ref 70–99)
GLUCOSE SERPL-MCNC: 323 MG/DL — HIGH (ref 70–99)
HCT VFR BLD CALC: 29.9 % — LOW (ref 39–50)
HGB BLD-MCNC: 10.2 G/DL — LOW (ref 13–17)
MCHC RBC-ENTMCNC: 32.5 PG — SIGNIFICANT CHANGE UP (ref 27–34)
MCHC RBC-ENTMCNC: 34.1 GM/DL — SIGNIFICANT CHANGE UP (ref 32–36)
MCV RBC AUTO: 95.2 FL — SIGNIFICANT CHANGE UP (ref 80–100)
METHOD TYPE: SIGNIFICANT CHANGE UP
METHOD TYPE: SIGNIFICANT CHANGE UP
ORGANISM # SPEC MICROSCOPIC CNT: SIGNIFICANT CHANGE UP
PLATELET # BLD AUTO: 198 K/UL — SIGNIFICANT CHANGE UP (ref 150–400)
POTASSIUM SERPL-MCNC: 4.6 MMOL/L — SIGNIFICANT CHANGE UP (ref 3.5–5.3)
POTASSIUM SERPL-SCNC: 4.6 MMOL/L — SIGNIFICANT CHANGE UP (ref 3.5–5.3)
RBC # BLD: 3.14 M/UL — LOW (ref 4.2–5.8)
RBC # FLD: 15 % — HIGH (ref 10.3–14.5)
SODIUM SERPL-SCNC: 132 MMOL/L — LOW (ref 135–145)
SPECIMEN SOURCE: SIGNIFICANT CHANGE UP
WBC # BLD: 8.91 K/UL — SIGNIFICANT CHANGE UP (ref 3.8–10.5)
WBC # FLD AUTO: 8.91 K/UL — SIGNIFICANT CHANGE UP (ref 3.8–10.5)

## 2019-11-26 RX ORDER — INSULIN LISPRO 100/ML
VIAL (ML) SUBCUTANEOUS
Refills: 0 | Status: DISCONTINUED | OUTPATIENT
Start: 2019-11-26 | End: 2019-11-27

## 2019-11-26 RX ORDER — SIMVASTATIN 20 MG/1
1 TABLET, FILM COATED ORAL
Qty: 30 | Refills: 0
Start: 2019-11-26 | End: 2019-12-25

## 2019-11-26 RX ORDER — INSULIN LISPRO 100/ML
7 VIAL (ML) SUBCUTANEOUS
Refills: 0 | Status: DISCONTINUED | OUTPATIENT
Start: 2019-11-26 | End: 2019-11-27

## 2019-11-26 RX ORDER — LOVASTATIN 20 MG
1 TABLET ORAL
Qty: 0 | Refills: 0 | DISCHARGE

## 2019-11-26 RX ORDER — ESOMEPRAZOLE MAGNESIUM 40 MG/1
1 CAPSULE, DELAYED RELEASE ORAL
Qty: 0 | Refills: 0 | DISCHARGE

## 2019-11-26 RX ORDER — INSULIN GLARGINE 100 [IU]/ML
24 INJECTION, SOLUTION SUBCUTANEOUS AT BEDTIME
Refills: 0 | Status: DISCONTINUED | OUTPATIENT
Start: 2019-11-26 | End: 2019-11-27

## 2019-11-26 RX ORDER — DIGOXIN 250 MCG
1 TABLET ORAL
Qty: 30 | Refills: 0
Start: 2019-11-26 | End: 2019-12-25

## 2019-11-26 RX ORDER — FOLIC ACID 0.8 MG
1 TABLET ORAL
Qty: 0 | Refills: 0 | DISCHARGE

## 2019-11-26 RX ORDER — TRIMETHOPRIM HYDROCHLORIDE 50 MG/5ML
1 SOLUTION ORAL
Qty: 0 | Refills: 0 | DISCHARGE

## 2019-11-26 RX ORDER — PANTOPRAZOLE SODIUM 20 MG/1
1 TABLET, DELAYED RELEASE ORAL
Qty: 30 | Refills: 0
Start: 2019-11-26 | End: 2019-12-25

## 2019-11-26 RX ORDER — FOLIC ACID 0.8 MG
1 TABLET ORAL
Qty: 30 | Refills: 0
Start: 2019-11-26 | End: 2019-12-25

## 2019-11-26 RX ADMIN — Medication 2 DROP(S): at 22:47

## 2019-11-26 RX ADMIN — APIXABAN 5 MILLIGRAM(S): 2.5 TABLET, FILM COATED ORAL at 17:09

## 2019-11-26 RX ADMIN — Medication 3: at 08:09

## 2019-11-26 RX ADMIN — INSULIN GLARGINE 24 UNIT(S): 100 INJECTION, SOLUTION SUBCUTANEOUS at 22:23

## 2019-11-26 RX ADMIN — Medication 4 UNIT(S): at 08:09

## 2019-11-26 RX ADMIN — Medication 7: at 12:22

## 2019-11-26 RX ADMIN — Medication 0.25 MILLIGRAM(S): at 05:42

## 2019-11-26 RX ADMIN — Medication 75 MILLIGRAM(S): at 00:12

## 2019-11-26 RX ADMIN — GABAPENTIN 300 MILLIGRAM(S): 400 CAPSULE ORAL at 17:09

## 2019-11-26 RX ADMIN — Medication 6: at 17:08

## 2019-11-26 RX ADMIN — Medication 1 APPLICATION(S): at 05:42

## 2019-11-26 RX ADMIN — Medication 1 TABLET(S): at 12:23

## 2019-11-26 RX ADMIN — Medication 4 UNIT(S): at 12:23

## 2019-11-26 RX ADMIN — Medication 1 APPLICATION(S): at 22:23

## 2019-11-26 RX ADMIN — PANTOPRAZOLE SODIUM 40 MILLIGRAM(S): 20 TABLET, DELAYED RELEASE ORAL at 05:42

## 2019-11-26 RX ADMIN — Medication 2 DROP(S): at 00:11

## 2019-11-26 RX ADMIN — Medication 2 DROP(S): at 12:22

## 2019-11-26 RX ADMIN — FINASTERIDE 5 MILLIGRAM(S): 5 TABLET, FILM COATED ORAL at 12:22

## 2019-11-26 RX ADMIN — Medication 2 DROP(S): at 17:09

## 2019-11-26 RX ADMIN — APIXABAN 5 MILLIGRAM(S): 2.5 TABLET, FILM COATED ORAL at 05:42

## 2019-11-26 RX ADMIN — Medication 7 UNIT(S): at 17:08

## 2019-11-26 RX ADMIN — Medication 1 APPLICATION(S): at 14:59

## 2019-11-26 RX ADMIN — Medication 75 MILLIGRAM(S): at 22:23

## 2019-11-26 RX ADMIN — Medication 1 MILLIGRAM(S): at 12:22

## 2019-11-26 RX ADMIN — Medication 2 DROP(S): at 05:41

## 2019-11-26 RX ADMIN — SIMVASTATIN 40 MILLIGRAM(S): 20 TABLET, FILM COATED ORAL at 22:23

## 2019-11-26 RX ADMIN — GABAPENTIN 300 MILLIGRAM(S): 400 CAPSULE ORAL at 05:42

## 2019-11-26 RX ADMIN — Medication 100 MILLIGRAM(S): at 12:22

## 2019-11-26 NOTE — DISCHARGE NOTE PROVIDER - NSDCCPCAREPLAN_GEN_ALL_CORE_FT
PRINCIPAL DISCHARGE DIAGNOSIS  Diagnosis: Rapid atrial fibrillation  Assessment and Plan of Treatment: Increased Lopressor and HR now well controlled  Atrial fibrillation is the most common heart rhythm problem.  The condition puts you at risk for has stroke and heart attack  Continue Eliquis - blood thinners to prevent possible clot and stroke complications.   Monitor for any signs of bleeding and avoid injury while on this medication  If any bleeding persistent and symptomatic stop the medication and notify your doctor immediately.  It helps if you control your blood pressure, not drink more than 1-2 alcohol drinks per day, cut down on caffeine, getting treatment for over active thyroid gland, and get regular exercise  Call your doctor if you feel your heart racing or beating unusually, chest tightness or pain, lightheaded, faint, shortness of breath especially with exercise  It is important to take your heart medication as prescribed        SECONDARY DISCHARGE DIAGNOSES  Diagnosis: Acute hypotension  Assessment and Plan of Treatment: hold lisinopril for now  B/P now improved    Diagnosis: Pericardial effusion  Assessment and Plan of Treatment: pericardial ,effusion will follow up as out pt  Follow up with Dr. Qiu in 1 week for follow up ECHO to monitor pericardial effusion  Seek immediate medical help for SOB, chest pain, dizziness, weakness, sweating      Diagnosis: Uncontrolled type 2 diabetes mellitus with hyperglycemia  Assessment and Plan of Treatment: Your next appointment with endocrinologist ([facility] endocrine ph: [phone])/PMD is -   HgA1C this admission -  Make sure you get your HgA1c checked every three months.  If you take insulin, check your blood glucose before meals and at bedtime.  It's important not to skip any meals.  Keep a log of your blood glucose results and always take it with you to your doctor appointments.  Keep a list of your current medications including injectables and over the counter medications and bring this medication list with you to all your doctor appointments.  If you have not seen your ophthalmologist this year call for appointment.  Check your feet daily for redness, sores, or openings. Do not self treat. If no improvement in two days call your primary care physician for an appointment.  Low blood sugar (hypoglycemia) is a blood sugar below 70mg/dl. Check your blood sugar if you feel signs/symptoms of hypoglycemia. If your blood sugar is below 70 take 15 grams of carbohydrates (ex 4 oz of apple juice, 3-4 glucose tablets, or 4-6 oz of regular soda) wait 15 minutes and repeat blood sugar to make sure it comes up above 70.  If your blood sugar is above 70 and you are due for a meal, have a meal.  If you are not due for a meal have a snack.  This snack helps keeps your blood sugar at a safe range. PRINCIPAL DISCHARGE DIAGNOSIS  Diagnosis: Rapid atrial fibrillation  Assessment and Plan of Treatment: Increased Lopressor and HR now well controlled  Atrial fibrillation is the most common heart rhythm problem.  The condition puts you at risk for has stroke and heart attack  Continue Eliquis - blood thinners to prevent possible clot and stroke complications.   Monitor for any signs of bleeding and avoid injury while on this medication  If any bleeding persistent and symptomatic stop the medication and notify your doctor immediately.  It helps if you control your blood pressure, not drink more than 1-2 alcohol drinks per day, cut down on caffeine, getting treatment for over active thyroid gland, and get regular exercise  Call your doctor if you feel your heart racing or beating unusually, chest tightness or pain, lightheaded, faint, shortness of breath especially with exercise  It is important to take your heart medication as prescribed        SECONDARY DISCHARGE DIAGNOSES  Diagnosis: Acute hypotension  Assessment and Plan of Treatment: hold lisinopril for now  B/P now improved    Diagnosis: Pericardial effusion  Assessment and Plan of Treatment: pericardial ,effusion will follow up as out pt  Follow up with Dr. Qiu in 1 week for follow up ECHO to monitor pericardial effusion  Seek immediate medical help for SOB, chest pain, dizziness, weakness, sweating      Diagnosis: Uncontrolled type 2 diabetes mellitus with hyperglycemia  Assessment and Plan of Treatment: Blood sugars were elevated and you were discharged on Glipizide and metformin and on Humalog sliding scale (check blood sugars with breakfast and dinner and coverage according to sliding scale)  Follow up with PMD - Dr. Mccartney on 12/2/19  HgA1C this admission - 7.3 in Oct 2019  Make sure you get your HgA1c checked every three months.  If you take insulin, check your blood glucose before meals and at bedtime.  It's important not to skip any meals.  Keep a log of your blood glucose results and always take it with you to your doctor appointments.  Keep a list of your current medications including injectables and over the counter medications and bring this medication list with you to all your doctor appointments.  If you have not seen your ophthalmologist this year call for appointment.  Check your feet daily for redness, sores, or openings. Do not self treat. If no improvement in two days call your primary care physician for an appointment.  Low blood sugar (hypoglycemia) is a blood sugar below 70mg/dl. Check your blood sugar if you feel signs/symptoms of hypoglycemia. If your blood sugar is below 70 take 15 grams of carbohydrates (ex 4 oz of apple juice, 3-4 glucose tablets, or 4-6 oz of regular soda) wait 15 minutes and repeat blood sugar to make sure it comes up above 70.  If your blood sugar is above 70 and you are due for a meal, have a meal.  If you are not due for a meal have a snack.  This snack helps keeps your blood sugar at a safe range.

## 2019-11-26 NOTE — DISCHARGE NOTE PROVIDER - PROVIDER TOKENS
PROVIDER:[[id number]],SCHEDULEDAPPT:[12/03/2019],SCHEDULEDAPPTTIME:[03:00 PM],ESTABLISHEDPATIENT:[T]],PROVIDER:[[id number]],SCHEDULEDAPPT:[11/29/2019],SCHEDULEDAPPTTIME:[11:00 PM],ESTABLISHEDPATIENT:[T]] PROVIDER:[[id number]],SCHEDULEDAPPT:[12/03/2019],SCHEDULEDAPPTTIME:[03:00 PM],ESTABLISHEDPATIENT:[T]],PROVIDER:[[id number]],SCHEDULEDAPPT:[11/29/2019],SCHEDULEDAPPTTIME:[11:00 PM],ESTABLISHEDPATIENT:[T]],PROVIDER:[[id number]],FOLLOWUP:[1 week]]

## 2019-11-26 NOTE — PROGRESS NOTE ADULT - SUBJECTIVE AND OBJECTIVE BOX
Chief Complaint: 83 yo male with h/o Type 2 DM,, Afib, lymphoma s.p chemo in July, recent admission for disseminated herpes zoster re-admitted after a syncopal episode.    Patient afebrile, PO intake moderate.  WBC 8.9.  FS high today despite increased insulin doses.      MEDICATIONS  (STANDING):  apixaban 5 milliGRAM(s) Oral two times a day  dextrose 5%. 1000 milliLiter(s) (50 mL/Hr) IV Continuous <Continuous>  dextrose 50% Injectable 12.5 Gram(s) IV Push once  dextrose 50% Injectable 25 Gram(s) IV Push once  dextrose 50% Injectable 25 Gram(s) IV Push once  digoxin     Tablet 0.25 milliGRAM(s) Oral daily  erythromycin   Ointment 1 Application(s) Left EYE three times a day  finasteride 5 milliGRAM(s) Oral daily  folic acid 1 milliGRAM(s) Oral daily  gabapentin 300 milliGRAM(s) Oral two times a day  insulin glargine Injectable (LANTUS) 24 Unit(s) SubCutaneous at bedtime  insulin lispro (HumaLOG) corrective regimen sliding scale   SubCutaneous three times a day before meals  insulin lispro (HumaLOG) corrective regimen sliding scale   SubCutaneous at bedtime  insulin lispro Injectable (HumaLOG) 7 Unit(s) SubCutaneous three times a day before meals  metoprolol tartrate 100 milliGRAM(s) Oral daily  metoprolol tartrate 75 milliGRAM(s) Oral at bedtime  pantoprazole    Tablet 40 milliGRAM(s) Oral before breakfast  prednisoLONE acetate 1% Suspension 2 Drop(s) Left EYE four times a day  simvastatin 40 milliGRAM(s) Oral at bedtime  trimethoprim   80 mG/sulfamethoxazole 400 mG 1 Tablet(s) Oral daily    MEDICATIONS  (PRN):  dextrose 40% Gel 15 Gram(s) Oral once PRN Blood Glucose LESS THAN 70 milliGRAM(s)/deciliter  glucagon  Injectable 1 milliGRAM(s) IntraMuscular once PRN Glucose LESS THAN 70 milligrams/deciliter      Allergies    No Known Allergies    Intolerances        PHYSICAL EXAM:  VITALS: T(C): 37 (11-26-19 @ 10:30)  T(F): 98.6 (11-26-19 @ 10:30), Max: 98.8 (11-25-19 @ 20:49)  HR: 97 (11-26-19 @ 10:30) (70 - 97)  BP: 149/81 (11-26-19 @ 10:30) (119/69 - 149/81)  RR:  (18 - 18)  SpO2:  (96% - 99%)  GENERAL: NAD, well-developed  EYES: No proptosis, no lid lag, anicteric  HEENT:  Atraumatic, Normocephalic, moist mucous membranes  THYROID: Normal size, no palpable nodules  RESPIRATORY: Clear to auscultation, decreased breath sounds both bases. No rales, rhonchi, wheezing  CARDIOVASCULAR: Regular rate and rhythm; No murmurs; no peripheral edema  GI: Soft, nontender, non distended, normal bowel sounds  SKIN: Dry, intact, No rashes or lesions  MUSCULOSKELETAL: Full range of motion, normal strength  NEURO: sensation intact, extraocular movements intact, no tremor  PSYCH: Alert and oriented x 3, normal affect, normal mood      POCT Blood Glucose.: 297 mg/dL (11-26-19 @ 16:48)  POCT Blood Glucose.: 382 mg/dL (11-26-19 @ 12:13)  POCT Blood Glucose.: 417 mg/dL (11-26-19 @ 12:11)  POCT Blood Glucose.: 290 mg/dL (11-26-19 @ 07:56)  POCT Blood Glucose.: 350 mg/dL (11-25-19 @ 21:32)  POCT Blood Glucose.: 284 mg/dL (11-25-19 @ 16:37)  POCT Blood Glucose.: 434 mg/dL (11-25-19 @ 12:06)  POCT Blood Glucose.: 434 mg/dL (11-25-19 @ 12:04)  POCT Blood Glucose.: 249 mg/dL (11-25-19 @ 07:57)  POCT Blood Glucose.: 371 mg/dL (11-24-19 @ 21:38)  POCT Blood Glucose.: 288 mg/dL (11-24-19 @ 16:48)  POCT Blood Glucose.: 391 mg/dL (11-24-19 @ 12:01)  POCT Blood Glucose.: 233 mg/dL (11-24-19 @ 08:02)  POCT Blood Glucose.: 291 mg/dL (11-23-19 @ 21:33)                            10.2   8.91  )-----------( 198      ( 26 Nov 2019 09:07 )             29.9       11-26    132<L>  |  97  |  26<H>  ----------------------------<  323<H>  4.6   |  22  |  0.92    EGFR if : 89  EGFR if non : 77    Ca    9.6      11-26  Mg     1.5     11-25        Thyroid Function Tests:  11-06 @ 08:51 TSH 1.25 FreeT4 -- T3 -- Anti TPO -- Anti Thyroglobulin Ab -- TSI --      Hemoglobin A1C, Whole Blood: 7.3 % <H> [4.0 - 5.6] (11-06-19 @ 09:05)

## 2019-11-26 NOTE — PROGRESS NOTE ADULT - ASSESSMENT
85 yo male with h/o Type 2 DM,, Afib, lymphoma s.p chemo in July, recent admission for disseminated herpes zoster re-admitted after a syncopal episode.    Patient with Type 2 DM treated with metformin 1,000 mg BID and glipizide 5 mg BID. Recently with prolonged febrile illness after a visit to [facility]. Patient recent HbA1c was 7.3%. He says FS before recent were 120-140 mg/dL.  Since admission he was noted with pericardial effusion, no evidence of infection, no steroids given. He has been on Lantus insulin 4-->8-->12 units and scale humalog (low) with hyperglycemia in the 200-400 mg/dL.  PO intake is good. Patient afebrile, normal renal function, WBC 5.5.    Patient with Type 2 DM acceptable control on two oral agents at home.  Here with no evidence for acute infection with significant hyperglycemia, no recent steroids, PO intake good.  Patient afebrile, PO intake moderate.  WBC 8.9.  FS high today despite increased insulin doses.  HbA1c earlier this month was 7.3%. As per wife FS at home before recent admission were 100-140 mg/dL at am.  During recent rehab stay he was off glipizide, and FS were high.    Suggest:  Increase lantus to 24 units daily.  Increase humalog 7 units per meal.  Mid scale humalog pre-meal scale.  If FS better controlled at am can D/C on Metformin 1,000 mg BID, Glipizide 5 mg BID and scale humalog before breakfast and dinner 2/50>200.  Diabetic teaching insulin to wife.

## 2019-11-26 NOTE — DISCHARGE NOTE PROVIDER - HOSPITAL COURSE
85 yo male with PMH of HTN, HLD, DM, CKD, CAD on CT, Diastolic HF, Afib on Eliquis, lymphoma s/p chemo by Dr. Rosas in July, h/o  anemia seen by GI on past visit, recent admission for disseminated herpes zoster here for concern of high HR and low BP today. pts wife states pt was c/o dizziness this am, she took his BP today which was low in the 80s and HR was high in the 120s, checked it again and BP was lower, called cardiologist and told him to come to the ED. Pt otherwise with no complaints, has been fatigued since hospitalization and lethargic, otherwise no cough, congestion, abdominal pain, fevers, chills, nausea, vomiting, dysuria.    Admitted for hypotension - SB/P in he 80s/ Tachycardia     Found to be in rapid Atrial fibrillation - rate improved with adjustment of beta Blockers (Lopressor), Lisinopril on hold for Hypotension with improvement of blood pressure    Echo shows pericardial effusion with increase in size with no tamponade. Cardiology cleared for discharge with outpatient follow up.    Patient with increase in blood sugars - endocrine consulted and pt to start humalog insulin with sliding scale coverage. 85 yo male with PMH of HTN, HLD, DM, CKD, CAD on CT, Diastolic HF, Afib on Eliquis, lymphoma s/p chemo by Dr. Rosas in July, h/o  anemia seen by GI on past visit, recent admission for disseminated herpes zoster here for concern of high HR and low BP today. pts wife states pt was c/o dizziness this am, she took his BP today which was low in the 80s and HR was high in the 120s, checked it again and BP was lower, called cardiologist and told him to come to the ED. Pt otherwise with no complaints, has been fatigued since hospitalization and lethargic, otherwise no cough, congestion, abdominal pain, fevers, chills, nausea, vomiting, dysuria.    Admitted for hypotension - SB/P in he 80s/ Tachycardia     Found to be in rapid Atrial fibrillation - rate improved with adjustment of beta Blockers (Lopressor) and digoxin added. Lisinopril on hold for Hypotension with improvement of blood pressure    Echo shows pericardial effusion with increase in size with no tamponade. Cardiology cleared for discharge with outpatient follow up.    Patient with increase in blood sugars - endocrine consulted and pt to start humalog insulin with sliding scale coverage.

## 2019-11-26 NOTE — PROGRESS NOTE ADULT - ASSESSMENT
84  yr  w/        HTN,   HLD,    DM (glipizide),   CKD. , CAD  on ct, . AF  on eliquis,  diastolic  chf       marginal  zone lymphoma ,    s/p chemo by dr brown,  july/ 2019/   none  recently,  , not on chemo at present   .  h/o  anemia/. seen by gi on past visit,           s/p  recent   h  zoster/  with crusted lesions,  ,  face / V1  distrubution, trigeminal  nerve        admitted with hypotension .  sbp  in 80/s  and tachycardia/  bp  ha s improved  since    BB  dose lowered.  with improvement in bp    DM,  follow fs  Lantus   increased     AF , on   asa/ Eliquis/  lopressor  bid/  lisinopril/ echo  ef  55  chronic  anemia     h/o   chronic  hyponatremia.,   sodium of  134.  now  128  on fluid restriction  echo, with pericardial effusion. no tamponade  ?  hold eliquis for now. will defer to cardiology,  given worsening pericardial effusion     < from: Transthoracic Echocardiogram (11.25.19 @ 10:35) >  onclusions:  1. Mitral annular calcification, otherwise normal mitral  valve. Mild-moderate mitral regurgitation.  2. Endocardium not well visualized; LV is foreshortened.  The Septum, inferolateral and  inferior wall  appear  mildly hypokinetic. Overall  EF is preserved.  3. Normal right ventricular size and function.  4. Circumfrential Pericardial effusion is seen whcih  measures up to 2.4cm  (large)  posterior to the LV  (previously  1.6cm ); 1.6cm (moderate)  lateral to the LV  (previously 1.0) and 0.8cm (small)  anterior to the RV.  There is no evidence of Tamponade.  *** Compared with echocardiogram of 6/5/2019, the overall  pericardial effusion is larger but no tamponade seen.  < end of copied text >       < from: CT Chest No Cont (06.02.19 @ 20:18) >  IMPRESSION:Small bilateral pleural effusions, left greater than right.  Interlobular septal thickening representing pulmonary edema.  Small pericarial effusion.  Coronary artery atherosclerotic disease.  < end of copied text ><     from: Transthoracic Echocardiogram (06.05.19 @ 10:32) >  onclusions:  1. Endocardium not well visualized; grossly normal left  ventricular systolic function.  2. Normal right ventricular size and function.  3. Normal tricuspid valve. Minimal tricuspid regurgitation.  4. Small pericardial effusion.  *** No previous Echo exam.  < end of copied text >

## 2019-11-26 NOTE — DISCHARGE NOTE PROVIDER - NSDCFUADDAPPT_GEN_ALL_CORE_FT
Follow up with Dr. Qiu in 1 week for follow up ECHO to monitor pericardial effusion  Take all discharge paperwork to all doctors visit

## 2019-11-26 NOTE — DISCHARGE NOTE NURSING/CASE MANAGEMENT/SOCIAL WORK - PATIENT PORTAL LINK FT
You can access the FollowMyHealth Patient Portal offered by [facility] by registering at the following website: [email or web address]. By joining [employer]’s FollowMyHealth portal, you will also be able to view your health information using other applications (apps) compatible with our system.

## 2019-11-26 NOTE — DISCHARGE NOTE PROVIDER - NSDCCPTREATMENT_GEN_ALL_CORE_FT
PRINCIPAL PROCEDURE  Procedure: Transthoracic echo  Findings and Treatment: 1. Mitral annular calcification, otherwise normal mitral  valve. Mild-moderate mitral regurgitation.  2. Endocardium not well visualized; LV is foreshortened.  The Septum, inferolateral and  inferior wall  appear  mildly hypokinetic. Overall  EF is preserved.  3. Normal right ventricular size and function.  4. Circumfrential Pericardial effusion is seen whcih  measures up to 2.4cm  (large)  posterior to the LV  (previously  1.6cm ); 1.6cm (moderate)  lateral to the LV  (previously 1.0) and 0.8cm (small)  anterior to the RV.  There is no evidence of Tamponade.  *** Compared with echocardiogram of 6/5/2019, the overall  pericardial effusion is larger but no tamponade seen.      SECONDARY PROCEDURE  Procedure: CT chest wo con  Findings and Treatment: CT Chest No Cont (06.02.19 @ 20:18) >  IMPRESSION:Small bilateral pleural effusions, left greater than right.  Interlobular septal thickening representing pulmonary edema.  Small pericarial effusion.  Coronary artery atherosclerotic disease.

## 2019-11-26 NOTE — PROGRESS NOTE ADULT - SUBJECTIVE AND OBJECTIVE BOX
CARDIOLOGY     PROGRESS  NOTE   ________________________________________________    CHIEF COMPLAINT:Patient is a 84y old  Male who presents with a chief complaint of hypotension/tachycardia (26 Nov 2019 08:25)  doing better.  	  REVIEW OF SYSTEMS:  CONSTITUTIONAL: No fever, weight loss, or fatigue  EYES: No eye pain, visual disturbances, or discharge  ENT:  No difficulty hearing, tinnitus, vertigo; No sinus or throat pain  NECK: No pain or stiffness  RESPIRATORY: No cough, wheezing, chills or hemoptysis; No Shortness of Breath  CARDIOVASCULAR: No chest pain, palpitations, passing out, dizziness, or leg swelling  GASTROINTESTINAL: No abdominal or epigastric pain. No nausea, vomiting, or hematemesis; No diarrhea or constipation. No melena or hematochezia.  GENITOURINARY: No dysuria, frequency, hematuria, or incontinence  NEUROLOGICAL: No headaches, memory loss, loss of strength, numbness, or tremors  SKIN: No itching, burning, rashes, or lesions   LYMPH Nodes: No enlarged glands  ENDOCRINE: No heat or cold intolerance; No hair loss  MUSCULOSKELETAL: No joint pain or swelling; No muscle, back, or extremity pain  PSYCHIATRIC: No depression, anxiety, mood swings, or difficulty sleeping  HEME/LYMPH: No easy bruising, or bleeding gums  ALLERGY AND IMMUNOLOGIC: No hives or eczema	    [ ] All others negative	  [ ] Unable to obtain    PHYSICAL EXAM:  T(C): 36.5 (11-26-19 @ 05:20), Max: 37.1 (11-25-19 @ 20:49)  HR: 94 (11-26-19 @ 05:20) (70 - 94)  BP: 119/69 (11-26-19 @ 05:20) (119/69 - 127/78)  RR: 18 (11-26-19 @ 05:20) (18 - 18)  SpO2: 99% (11-26-19 @ 05:20) (96% - 99%)  Wt(kg): --  I&O's Summary    25 Nov 2019 07:01  -  26 Nov 2019 07:00  --------------------------------------------------------  IN: 1060 mL / OUT: 500 mL / NET: 560 mL        Appearance: Normal	  HEENT:   Normal oral mucosa, PERRL, EOMI	  Lymphatic: No lymphadenopathy  Cardiovascular: Normal S1 S2, No JVD, + murmurs, No edema  Respiratory: Lungs clear to auscultation	  Psychiatry: A & O x 3, Mood & affect appropriate  Gastrointestinal:  Soft, Non-tender, + BS	  Skin: No rashes, No ecchymoses, No cyanosis	  Neurologic: Non-focal  Extremities: Normal range of motion, No clubbing, cyanosis or edema  Vascular: Peripheral pulses palpable 2+ bilaterally    MEDICATIONS  (STANDING):  apixaban 5 milliGRAM(s) Oral two times a day  dextrose 5%. 1000 milliLiter(s) (50 mL/Hr) IV Continuous <Continuous>  dextrose 50% Injectable 12.5 Gram(s) IV Push once  dextrose 50% Injectable 25 Gram(s) IV Push once  dextrose 50% Injectable 25 Gram(s) IV Push once  digoxin     Tablet 0.25 milliGRAM(s) Oral daily  erythromycin   Ointment 1 Application(s) Left EYE three times a day  finasteride 5 milliGRAM(s) Oral daily  folic acid 1 milliGRAM(s) Oral daily  gabapentin 300 milliGRAM(s) Oral two times a day  insulin glargine Injectable (LANTUS) 16 Unit(s) SubCutaneous at bedtime  insulin lispro (HumaLOG) corrective regimen sliding scale   SubCutaneous three times a day before meals  insulin lispro (HumaLOG) corrective regimen sliding scale   SubCutaneous at bedtime  insulin lispro Injectable (HumaLOG) 4 Unit(s) SubCutaneous three times a day before meals  metoprolol tartrate 100 milliGRAM(s) Oral daily  metoprolol tartrate 75 milliGRAM(s) Oral at bedtime  pantoprazole    Tablet 40 milliGRAM(s) Oral before breakfast  prednisoLONE acetate 1% Suspension 2 Drop(s) Left EYE four times a day  simvastatin 40 milliGRAM(s) Oral at bedtime  trimethoprim   80 mG/sulfamethoxazole 400 mG 1 Tablet(s) Oral daily      TELEMETRY: 	    ECG:  	  RADIOLOGY:  OTHER: 	  	  LABS:	 	    CARDIAC MARKERS:            11-26    132<L>  |  97  |  26<H>  ----------------------------<  323<H>  4.6   |  22  |  0.92    Ca    9.6      26 Nov 2019 06:00  Mg     1.5     11-25      proBNP:   Lipid Profile:   HgA1c: Hemoglobin A1C, Whole Blood: 7.3 % (11-06 @ 09:05)    TSH: Thyroid Stimulating Hormone, Serum: 1.25 uIU/mL (11-06 @ 08:51)    < from: Transthoracic Echocardiogram (11.25.19 @ 10:35) >  1. Mitral annular calcification, otherwise normal mitral  valve. Mild-moderate mitral regurgitation.  2. Endocardium not well visualized; LV is foreshortened.  The Septum, inferolateral and  inferior wall  appear  mildly hypokinetic. Overall  EF is preserved.  3. Normal right ventricular size and function.  4. Circumfrential Pericardial effusion is seen whcih  measures up to 2.4cm  (large)  posterior to the LV  (previously  1.6cm ); 1.6cm (moderate)  lateral to the LV  (previously 1.0) and 0.8cm (small)  anterior to the RV.  There is no evidence of Tamponade.    < end of copied text >        Assessment and plan  ---------------------------  85 yo male with pmh DM,, Afib, lymphoma s.p chemo in July recent admission for disseminated herpes zoster here for concern og high HR and low BP today. pts wife states pt was c/o dizziness this am, she took his BP today which was low in the 80s and HR was high in the 120s, checked it again and BP was lower , called cardiologist and told him to come to the ED. Pt otherwise with no complaints, has been fatigued since hospitalization and lethargic, otherwise no cough, congestion, abdominal pain, fevers, chills, nausea, vomiting, dysuria.  hypotension/ Tachycardia r/o sepsis. dehydration  check cultures   no abx yet  ivf gentle hydration  decrease beta blocker  tele  ac continue  hold lisinopril for now  physical therapy  increase beta blocker for better hr control  add iv dig dose for better hr control  pericardial ,effusion will follow up as out pt

## 2019-11-26 NOTE — DISCHARGE NOTE PROVIDER - NSDCMRMEDTOKEN_GEN_ALL_CORE_FT
apixaban 5 mg oral tablet: 1 tab(s) orally every 12 hours  digoxin 250 mcg (0.25 mg) oral tablet: 1 tab(s) orally once a day  erythromycin 0.5% ophthalmic ointment: 1 application to each affected eye 3 times a day  finasteride 5 mg oral tablet: 1 tab(s) orally once a day  folic acid 1 mg oral tablet: 1 tab(s) orally once a day  gabapentin 300 mg oral capsule: 1 cap(s) orally 2 times a day  glipiZIDE 5 mg oral tablet: 1 tab(s) orally 2 times a day   metFORMIN 500 mg oral tablet: 1 tab(s) orally 2 times a day  metoprolol tartrate 100 mg oral tablet: 1 tab(s) orally once a day  metoprolol tartrate 75 mg oral tablet: 1 tab(s) orally once a day (at bedtime)  ocular lubricant ophthalmic solution: 1 drop(s) to each affected eye every 2 hours  pantoprazole 40 mg oral delayed release tablet: 1 tab(s) orally once a day (before a meal)  prednisoLONE acetate 1% ophthalmic suspension: 2 drop(s) to each affected eye 4 times a day  simvastatin 40 mg oral tablet: 1 tab(s) orally once a day (at bedtime) apixaban 5 mg oral tablet: 1 tab(s) orally every 12 hours  digoxin 250 mcg (0.25 mg) oral tablet: 1 tab(s) orally once a day  erythromycin 0.5% ophthalmic ointment: 1 application to each affected eye 3 times a day  finasteride 5 mg oral tablet: 1 tab(s) orally once a day  folic acid 1 mg oral tablet: 1 tab(s) orally once a day  gabapentin 300 mg oral capsule: 1 cap(s) orally 2 times a day  glipiZIDE 5 mg oral tablet: 1 tab(s) orally 2 times a day   metFORMIN 1000 mg oral tablet: 1 tab(s) orally 2 times a day  metoprolol tartrate 100 mg oral tablet: 1 tab(s) orally once a day  metoprolol tartrate 75 mg oral tablet: 1 tab(s) orally once a day (at bedtime)  ocular lubricant ophthalmic solution: 1 drop(s) to each affected eye every 2 hours, As Needed  pantoprazole 40 mg oral delayed release tablet: 1 tab(s) orally once a day (before a meal)  prednisoLONE acetate 1% ophthalmic suspension: 2 drop(s) to each affected eye 4 times a day  simvastatin 40 mg oral tablet: 1 tab(s) orally once a day (at bedtime)  sulfamethoxazole-trimethoprim 400 mg-80 mg oral tablet: 1 tab(s) orally once a day apixaban 5 mg oral tablet: 1 tab(s) orally every 12 hours  Blood glucose test strips:   digoxin 250 mcg (0.25 mg) oral tablet: 1 tab(s) orally once a day  erythromycin 0.5% ophthalmic ointment: 1 application to each affected eye 3 times a day  finasteride 5 mg oral tablet: 1 tab(s) orally once a day  folic acid 1 mg oral tablet: 1 tab(s) orally once a day  gabapentin 300 mg oral capsule: 1 cap(s) orally 2 times a day  glipiZIDE 5 mg oral tablet: 1 tab(s) orally 2 times a day   HumaLOG KwikPen 100 units/mL injectable solution: for blood sugars with breakfast and dinner  2 Unit(s) if Glucose 201 - 250  4 Unit(s) if Glucose 251 - 300  6 Unit(s) if Glucose 301 - 350  8 Unit(s) if Glucose 351 - 400  10 Unit(s) if Glucose Greater Than 400   Lancets: Dispense 1 box  metFORMIN 1000 mg oral tablet: 1 tab(s) orally 2 times a day  metoprolol tartrate 100 mg oral tablet: 1 tab(s) orally once a day  metoprolol tartrate 75 mg oral tablet: 1 tab(s) orally once a day (at bedtime)  Irena needles for insuilin: # 30 day supply  ocular lubricant ophthalmic solution: 1 drop(s) to each affected eye every 2 hours, As Needed  pantoprazole 40 mg oral delayed release tablet: 1 tab(s) orally once a day (before a meal)  prednisoLONE acetate 1% ophthalmic suspension: 2 drop(s) to each affected eye 4 times a day  simvastatin 40 mg oral tablet: 1 tab(s) orally once a day (at bedtime)  sulfamethoxazole-trimethoprim 400 mg-80 mg oral tablet: 1 tab(s) orally once a day

## 2019-11-26 NOTE — PROGRESS NOTE ADULT - SUBJECTIVE AND OBJECTIVE BOX
no cp/sob    REVIEW OF SYSTEMS:  GEN: no fever,    no chills  RESP: no SOB,   no cough  CVS: no chest pain,   no palpitations  GI: no abdominal pain,   no nausea,   no vomiting,   no constipation,   no diarrhea  : no dysuria,   no frequency  NEURO: no headache,   no dizziness  PSYCH: no depression,   not anxious  Derm : no rash    MEDICATIONS  (STANDING):  apixaban 5 milliGRAM(s) Oral two times a day  dextrose 5%. 1000 milliLiter(s) (50 mL/Hr) IV Continuous <Continuous>  dextrose 50% Injectable 12.5 Gram(s) IV Push once  dextrose 50% Injectable 25 Gram(s) IV Push once  dextrose 50% Injectable 25 Gram(s) IV Push once  digoxin     Tablet 0.25 milliGRAM(s) Oral daily  erythromycin   Ointment 1 Application(s) Left EYE three times a day  finasteride 5 milliGRAM(s) Oral daily  folic acid 1 milliGRAM(s) Oral daily  gabapentin 300 milliGRAM(s) Oral two times a day  insulin glargine Injectable (LANTUS) 16 Unit(s) SubCutaneous at bedtime  insulin lispro (HumaLOG) corrective regimen sliding scale   SubCutaneous three times a day before meals  insulin lispro (HumaLOG) corrective regimen sliding scale   SubCutaneous at bedtime  insulin lispro Injectable (HumaLOG) 4 Unit(s) SubCutaneous three times a day before meals  metoprolol tartrate 100 milliGRAM(s) Oral daily  metoprolol tartrate 75 milliGRAM(s) Oral at bedtime  pantoprazole    Tablet 40 milliGRAM(s) Oral before breakfast  prednisoLONE acetate 1% Suspension 2 Drop(s) Left EYE four times a day  simvastatin 40 milliGRAM(s) Oral at bedtime  trimethoprim   80 mG/sulfamethoxazole 400 mG 1 Tablet(s) Oral daily    MEDICATIONS  (PRN):  dextrose 40% Gel 15 Gram(s) Oral once PRN Blood Glucose LESS THAN 70 milliGRAM(s)/deciliter  glucagon  Injectable 1 milliGRAM(s) IntraMuscular once PRN Glucose LESS THAN 70 milligrams/deciliter      Vital Signs Last 24 Hrs  T(C): 36.5 (26 Nov 2019 05:20), Max: 37.1 (25 Nov 2019 20:49)  T(F): 97.7 (26 Nov 2019 05:20), Max: 98.8 (25 Nov 2019 20:49)  HR: 94 (26 Nov 2019 05:20) (70 - 94)  BP: 119/69 (26 Nov 2019 05:20) (119/69 - 127/78)  BP(mean): --  RR: 18 (26 Nov 2019 05:20) (18 - 18)  SpO2: 99% (26 Nov 2019 05:20) (96% - 99%)  CAPILLARY BLOOD GLUCOSE      POCT Blood Glucose.: 290 mg/dL (26 Nov 2019 07:56)  POCT Blood Glucose.: 350 mg/dL (25 Nov 2019 21:32)  POCT Blood Glucose.: 284 mg/dL (25 Nov 2019 16:37)  POCT Blood Glucose.: 434 mg/dL (25 Nov 2019 12:06)  POCT Blood Glucose.: 434 mg/dL (25 Nov 2019 12:04)    I&O's Summary    25 Nov 2019 07:01  -  26 Nov 2019 07:00  --------------------------------------------------------  IN: 1060 mL / OUT: 500 mL / NET: 560 mL        PHYSICAL EXAM:  HEAD:  Atraumatic, Normocephalic  NECK: Supple, No   JVD  CHEST/LUNG:   no     rales,     no,    rhonchi  HEART: Regular rate and rhythm;         murmur  ABDOMEN: Soft, Nontender, ;   EXTREMITIES:   no     edema  NEUROLOGY:  alert    LABS:    11-26    132<L>  |  97  |  26<H>  ----------------------------<  323<H>  4.6   |  22  |  0.92    Ca    9.6      26 Nov 2019 06:00  Mg     1.5     11-25                    Hemoglobin A1C, Whole Blood: 7.3 % (11-06 @ 09:05)    Thyroid Stimulating Hormone, Serum: 1.25 uIU/mL (11-06 @ 08:51)          Consultant(s) Notes Reviewed:      Care Discussed with Consultants/Other Providers:

## 2019-11-26 NOTE — DISCHARGE NOTE PROVIDER - CARE PROVIDER_API CALL
Rosa Qiu (DO)  Cardiovascular Disease  [address] [place] [address], [address]  [place] [address]  Phone: [phone]  Fax: [phone]  Established Patient  Scheduled Appointment: 12/03/2019 03:00 PM    Rob Rosas)  EndocrinologyMetabDiabetes; Internal Medicine  [address] [facility], [facility] [address]  [place] [address]  Phone: [phone]  Fax: [phone]  Established Patient  Scheduled Appointment: 11/29/2019 11:00 PM Rosa Qiu (DO)  Cardiovascular Disease  [address] [facility] [facility], [address]  [place] [address]  Phone: [phone]  Fax: [phone]  Established Patient  Scheduled Appointment: 12/03/2019 03:00 PM    Rob Rosas)  EndocrinologyMetabDiabetes; Internal Medicine  [address] [facility] [address]  [place] [address]  Phone: [phone]  Fax: [phone]  Established Patient  Scheduled Appointment: 11/29/2019 11:00 PM    Marvel Mccartney)  Internal Medicine  [address], [place] [address]  Phone: [phone]  Fax: [phone]  Follow Up Time: 1 week

## 2019-11-27 VITALS
SYSTOLIC BLOOD PRESSURE: 111 MMHG | TEMPERATURE: 97 F | OXYGEN SATURATION: 98 % | HEART RATE: 87 BPM | DIASTOLIC BLOOD PRESSURE: 69 MMHG | RESPIRATION RATE: 18 BRPM

## 2019-11-27 LAB
ALBUMIN SERPL ELPH-MCNC: 4 G/DL — SIGNIFICANT CHANGE UP (ref 3.3–5)
ALP SERPL-CCNC: 55 U/L — SIGNIFICANT CHANGE UP (ref 40–120)
ALT FLD-CCNC: 19 U/L — SIGNIFICANT CHANGE UP (ref 10–45)
ANION GAP SERPL CALC-SCNC: 8 MMOL/L — SIGNIFICANT CHANGE UP (ref 5–17)
ANISOCYTOSIS BLD QL: SLIGHT — SIGNIFICANT CHANGE UP
AST SERPL-CCNC: 15 U/L — SIGNIFICANT CHANGE UP (ref 10–40)
BASOPHILS # BLD AUTO: 0 K/UL — SIGNIFICANT CHANGE UP (ref 0–0.2)
BASOPHILS NFR BLD AUTO: 0 % — SIGNIFICANT CHANGE UP (ref 0–2)
BILIRUB SERPL-MCNC: 0.4 MG/DL — SIGNIFICANT CHANGE UP (ref 0.2–1.2)
BUN SERPL-MCNC: 30 MG/DL — HIGH (ref 7–23)
CALCIUM SERPL-MCNC: 9.8 MG/DL — SIGNIFICANT CHANGE UP (ref 8.4–10.5)
CHLORIDE SERPL-SCNC: 98 MMOL/L — SIGNIFICANT CHANGE UP (ref 96–108)
CO2 SERPL-SCNC: 28 MMOL/L — SIGNIFICANT CHANGE UP (ref 22–31)
CREAT SERPL-MCNC: 0.99 MG/DL — SIGNIFICANT CHANGE UP (ref 0.5–1.3)
ELLIPTOCYTES BLD QL SMEAR: SLIGHT — SIGNIFICANT CHANGE UP
EOSINOPHIL # BLD AUTO: 0.18 K/UL — SIGNIFICANT CHANGE UP (ref 0–0.5)
EOSINOPHIL NFR BLD AUTO: 1.8 % — SIGNIFICANT CHANGE UP (ref 0–6)
GLUCOSE BLDC GLUCOMTR-MCNC: 138 MG/DL — HIGH (ref 70–99)
GLUCOSE BLDC GLUCOMTR-MCNC: 159 MG/DL — HIGH (ref 70–99)
GLUCOSE BLDC GLUCOMTR-MCNC: 165 MG/DL — HIGH (ref 70–99)
GLUCOSE SERPL-MCNC: 198 MG/DL — HIGH (ref 70–99)
HCT VFR BLD CALC: 31.6 % — LOW (ref 39–50)
HGB BLD-MCNC: 10.8 G/DL — LOW (ref 13–17)
LYMPHOCYTES # BLD AUTO: 0.78 K/UL — LOW (ref 1–3.3)
LYMPHOCYTES # BLD AUTO: 7.8 % — LOW (ref 13–44)
MACROCYTES BLD QL: SLIGHT — SIGNIFICANT CHANGE UP
MANUAL SMEAR VERIFICATION: SIGNIFICANT CHANGE UP
MCHC RBC-ENTMCNC: 32.9 PG — SIGNIFICANT CHANGE UP (ref 27–34)
MCHC RBC-ENTMCNC: 34.2 GM/DL — SIGNIFICANT CHANGE UP (ref 32–36)
MCV RBC AUTO: 96.3 FL — SIGNIFICANT CHANGE UP (ref 80–100)
METAMYELOCYTES # FLD: 6.1 % — HIGH (ref 0–0)
MONOCYTES # BLD AUTO: 0.52 K/UL — SIGNIFICANT CHANGE UP (ref 0–0.9)
MONOCYTES NFR BLD AUTO: 5.2 % — SIGNIFICANT CHANGE UP (ref 2–14)
MYELOCYTES NFR BLD: 2.6 % — HIGH (ref 0–0)
NEUTROPHILS # BLD AUTO: 7.65 K/UL — HIGH (ref 1.8–7.4)
NEUTROPHILS NFR BLD AUTO: 68.7 % — SIGNIFICANT CHANGE UP (ref 43–77)
NEUTS BAND # BLD: 7.8 % — SIGNIFICANT CHANGE UP (ref 0–8)
PLAT MORPH BLD: NORMAL — SIGNIFICANT CHANGE UP
PLATELET # BLD AUTO: 208 K/UL — SIGNIFICANT CHANGE UP (ref 150–400)
POIKILOCYTOSIS BLD QL AUTO: SLIGHT — SIGNIFICANT CHANGE UP
POTASSIUM SERPL-MCNC: 4.8 MMOL/L — SIGNIFICANT CHANGE UP (ref 3.5–5.3)
POTASSIUM SERPL-SCNC: 4.8 MMOL/L — SIGNIFICANT CHANGE UP (ref 3.5–5.3)
PROT SERPL-MCNC: 5.9 G/DL — LOW (ref 6–8.3)
RBC # BLD: 3.28 M/UL — LOW (ref 4.2–5.8)
RBC # FLD: 15.2 % — HIGH (ref 10.3–14.5)
RBC BLD AUTO: ABNORMAL
SODIUM SERPL-SCNC: 134 MMOL/L — LOW (ref 135–145)
WBC # BLD: 10 K/UL — SIGNIFICANT CHANGE UP (ref 3.8–10.5)
WBC # FLD AUTO: 10 K/UL — SIGNIFICANT CHANGE UP (ref 3.8–10.5)

## 2019-11-27 PROCEDURE — 97161 PT EVAL LOW COMPLEX 20 MIN: CPT

## 2019-11-27 PROCEDURE — 85027 COMPLETE CBC AUTOMATED: CPT

## 2019-11-27 PROCEDURE — 93005 ELECTROCARDIOGRAM TRACING: CPT

## 2019-11-27 PROCEDURE — 81001 URINALYSIS AUTO W/SCOPE: CPT

## 2019-11-27 PROCEDURE — 93306 TTE W/DOPPLER COMPLETE: CPT

## 2019-11-27 PROCEDURE — 85730 THROMBOPLASTIN TIME PARTIAL: CPT

## 2019-11-27 PROCEDURE — 80048 BASIC METABOLIC PNL TOTAL CA: CPT

## 2019-11-27 PROCEDURE — 80053 COMPREHEN METABOLIC PANEL: CPT

## 2019-11-27 PROCEDURE — 82962 GLUCOSE BLOOD TEST: CPT

## 2019-11-27 PROCEDURE — 36415 COLL VENOUS BLD VENIPUNCTURE: CPT

## 2019-11-27 PROCEDURE — 87086 URINE CULTURE/COLONY COUNT: CPT

## 2019-11-27 PROCEDURE — 99285 EMERGENCY DEPT VISIT HI MDM: CPT | Mod: 25

## 2019-11-27 PROCEDURE — 87186 SC STD MICRODIL/AGAR DIL: CPT

## 2019-11-27 PROCEDURE — 85610 PROTHROMBIN TIME: CPT

## 2019-11-27 PROCEDURE — 71045 X-RAY EXAM CHEST 1 VIEW: CPT

## 2019-11-27 PROCEDURE — 83735 ASSAY OF MAGNESIUM: CPT

## 2019-11-27 RX ORDER — INSULIN LISPRO 100/ML
2 VIAL (ML) SUBCUTANEOUS
Qty: 2 | Refills: 0
Start: 2019-11-27

## 2019-11-27 RX ORDER — INSULIN LISPRO 100/ML
0 VIAL (ML) SUBCUTANEOUS
Qty: 0 | Refills: 0 | DISCHARGE

## 2019-11-27 RX ORDER — METFORMIN HYDROCHLORIDE 850 MG/1
1 TABLET ORAL
Qty: 60 | Refills: 0
Start: 2019-11-27 | End: 2019-12-26

## 2019-11-27 RX ORDER — INSULIN LISPRO 100/ML
2 VIAL (ML) SUBCUTANEOUS
Qty: 3 | Refills: 0
Start: 2019-11-27

## 2019-11-27 RX ORDER — METFORMIN HYDROCHLORIDE 850 MG/1
1 TABLET ORAL
Qty: 0 | Refills: 0 | DISCHARGE

## 2019-11-27 RX ADMIN — Medication 100 MILLIGRAM(S): at 06:50

## 2019-11-27 RX ADMIN — Medication 7 UNIT(S): at 12:24

## 2019-11-27 RX ADMIN — Medication 2: at 08:59

## 2019-11-27 RX ADMIN — Medication 2: at 12:24

## 2019-11-27 RX ADMIN — Medication 7 UNIT(S): at 09:00

## 2019-11-27 RX ADMIN — Medication 1 APPLICATION(S): at 06:51

## 2019-11-27 RX ADMIN — Medication 1 TABLET(S): at 12:23

## 2019-11-27 RX ADMIN — Medication 1 MILLIGRAM(S): at 12:23

## 2019-11-27 RX ADMIN — Medication 2 DROP(S): at 06:50

## 2019-11-27 RX ADMIN — APIXABAN 5 MILLIGRAM(S): 2.5 TABLET, FILM COATED ORAL at 06:50

## 2019-11-27 RX ADMIN — Medication 0.25 MILLIGRAM(S): at 06:50

## 2019-11-27 RX ADMIN — GABAPENTIN 300 MILLIGRAM(S): 400 CAPSULE ORAL at 06:50

## 2019-11-27 RX ADMIN — FINASTERIDE 5 MILLIGRAM(S): 5 TABLET, FILM COATED ORAL at 12:23

## 2019-11-27 RX ADMIN — Medication 2 DROP(S): at 12:23

## 2019-11-27 RX ADMIN — PANTOPRAZOLE SODIUM 40 MILLIGRAM(S): 20 TABLET, DELAYED RELEASE ORAL at 06:50

## 2019-11-27 NOTE — PROGRESS NOTE ADULT - ASSESSMENT
84  yr  w/        HTN,   HLD,    DM (glipizide),   CKD. , CAD  on ct, . AF  on eliquis,  diastolic  chf       marginal  zone lymphoma ,    s/p chemo by dr brown,  july/ 2019/   none  recently,  , not on chemo at present   .  h/o  anemia/. seen by gi on past visit,           s/p  recent   h  zoster/  with crusted lesions,  ,  face / V1  distrubution, trigeminal  nerve        admitted with hypotension .  sbp  in 80/s  and tachycardia/  bp  ha s improved  since    BB  dose lowered.  with improvement in bp    DM,  follow fs  Lantus  was   increased     AF , on   asa/ Eliquis/  lopressor  bid/  lisinopril/ echo  ef  55  chronic  anemia     h/o   chronic  hyponatremia.,   sodium of  134.  on fluid restriction  echo, with pericardial effusion. no tamponade  per  card.  f/p with  echo  in few  weekd     < from: Transthoracic Echocardiogram (11.25.19 @ 10:35) >  onclusions:  1. Mitral annular calcification, otherwise normal mitral  valve. Mild-moderate mitral regurgitation.  2. Endocardium not well visualized; LV is foreshortened.  The Septum, inferolateral and  inferior wall  appear  mildly hypokinetic. Overall  EF is preserved.  3. Normal right ventricular size and function.  4. Circumfrential Pericardial effusion is seen whcih  measures up to 2.4cm  (large)  posterior to the LV  (previously  1.6cm ); 1.6cm (moderate)  lateral to the LV  (previously 1.0) and 0.8cm (small)  anterior to the RV.  There is no evidence of Tamponade.  *** Compared with echocardiogram of 6/5/2019, the overall  pericardial effusion is larger but no tamponade seen.  < end of copied text >       < from: CT Chest No Cont (06.02.19 @ 20:18) >  IMPRESSION:Small bilateral pleural effusions, left greater than right.  Interlobular septal thickening representing pulmonary edema.  Small pericarial effusion.  Coronary artery atherosclerotic disease.  < end of copied text ><     from: Transthoracic Echocardiogram (06.05.19 @ 10:32) >  onclusions:  1. Endocardium not well visualized; grossly normal left  ventricular systolic function.  2. Normal right ventricular size and function.  3. Normal tricuspid valve. Minimal tricuspid regurgitation.  4. Small pericardial effusion.  *** No previous Echo exam.  < end of copied text >

## 2019-11-27 NOTE — PROGRESS NOTE ADULT - ASSESSMENT
83 yo male with h/o Type 2 DM,, Afib, lymphoma s.p chemo in July, recent admission for disseminated herpes zoster re-admitted after a syncopal episode.    Patient with Type 2 DM treated with metformin 1,000 mg BID and glipizide 5 mg BID. Recently with prolonged febrile illness after a visit to [facility]. Patient recent HbA1c was 7.3%. He says FS before recent were 120-140 mg/dL.  Since admission he was noted with pericardial effusion, no evidence of infection, no steroids given. He has been on Lantus insulin 4-->8-->12 units and scale humalog (low) with hyperglycemia in the 200-400 mg/dL.  PO intake is good. Patient afebrile, normal renal function, WBC 5.5.    Patient with Type 2 DM acceptable control on two oral agents at home.  Here with no evidence for acute infection with significant hyperglycemia, no recent steroids, PO intake good.  Patient afebrile, PO intake moderate.  Insulin doses increased- basal lantus 24 units, pre-meal Humalog 7 units.  FS in the 150-200 mg/dL range since.  HbA1c earlier this month was 7.3%. As per wife FS at home before recent admission were 100-140 mg/dL at am.  During recent rehab stay he was off glipizide, and FS were high.    Suggest:  Keep current insulin course till discharge.  D/C on Metformin 1,000 mg BID, Glipizide 5 mg BID and scale humalog before breakfast and dinner 2/50>200.  D/W patient's wife, she was tough how to use insulin. Will call if hyperglycemia reoccurs.

## 2019-11-27 NOTE — CHART NOTE - NSCHARTNOTEFT_GEN_A_CORE
Request from Dr. Qiu to facilitate patient discharge. Medication reconciliation reviewed, revised, and resolved with Dr. Qiu and Dr. Rosas - re: diabetes meds and Dr. Qiu medically cleared patient for discharge with follow-up as advised. Please refer to discharge note for detailed hospital course. Patient is currently stable for discharge to home with home care at this time.    Contact # [phone]

## 2019-11-27 NOTE — PROGRESS NOTE ADULT - SUBJECTIVE AND OBJECTIVE BOX
no cp/sob    REVIEW OF SYSTEMS:  GEN: no fever,    no chills  RESP: no SOB,   no cough  CVS: no chest pain,   no palpitations  GI: no abdominal pain,   no nausea,   no vomiting,   no constipation,   no diarrhea  : no dysuria,   no frequency  NEURO: no headache,   no dizziness  PSYCH: no depression,   not anxious  Derm : no rash    MEDICATIONS  (STANDING):  apixaban 5 milliGRAM(s) Oral two times a day  dextrose 5%. 1000 milliLiter(s) (50 mL/Hr) IV Continuous <Continuous>  dextrose 50% Injectable 12.5 Gram(s) IV Push once  dextrose 50% Injectable 25 Gram(s) IV Push once  dextrose 50% Injectable 25 Gram(s) IV Push once  digoxin     Tablet 0.25 milliGRAM(s) Oral daily  erythromycin   Ointment 1 Application(s) Left EYE three times a day  finasteride 5 milliGRAM(s) Oral daily  folic acid 1 milliGRAM(s) Oral daily  gabapentin 300 milliGRAM(s) Oral two times a day  insulin glargine Injectable (LANTUS) 24 Unit(s) SubCutaneous at bedtime  insulin lispro (HumaLOG) corrective regimen sliding scale   SubCutaneous three times a day before meals  insulin lispro (HumaLOG) corrective regimen sliding scale   SubCutaneous at bedtime  insulin lispro Injectable (HumaLOG) 7 Unit(s) SubCutaneous three times a day before meals  metoprolol tartrate 100 milliGRAM(s) Oral daily  metoprolol tartrate 75 milliGRAM(s) Oral at bedtime  pantoprazole    Tablet 40 milliGRAM(s) Oral before breakfast  prednisoLONE acetate 1% Suspension 2 Drop(s) Left EYE four times a day  simvastatin 40 milliGRAM(s) Oral at bedtime  trimethoprim   80 mG/sulfamethoxazole 400 mG 1 Tablet(s) Oral daily    MEDICATIONS  (PRN):  dextrose 40% Gel 15 Gram(s) Oral once PRN Blood Glucose LESS THAN 70 milliGRAM(s)/deciliter  glucagon  Injectable 1 milliGRAM(s) IntraMuscular once PRN Glucose LESS THAN 70 milligrams/deciliter      Vital Signs Last 24 Hrs  T(C): 36.8 (27 Nov 2019 04:26), Max: 37 (26 Nov 2019 10:30)  T(F): 98.3 (27 Nov 2019 04:26), Max: 98.6 (26 Nov 2019 10:30)  HR: 79 (27 Nov 2019 04:26) (79 - 97)  BP: 132/84 (27 Nov 2019 04:26) (114/70 - 149/81)  BP(mean): --  RR: 18 (27 Nov 2019 04:26) (18 - 18)  SpO2: 97% (27 Nov 2019 04:26) (96% - 98%)  CAPILLARY BLOOD GLUCOSE      POCT Blood Glucose.: 165 mg/dL (27 Nov 2019 08:43)  POCT Blood Glucose.: 164 mg/dL (26 Nov 2019 21:32)  POCT Blood Glucose.: 297 mg/dL (26 Nov 2019 16:48)  POCT Blood Glucose.: 382 mg/dL (26 Nov 2019 12:13)  POCT Blood Glucose.: 417 mg/dL (26 Nov 2019 12:11)    I&O's Summary    26 Nov 2019 07:01  -  27 Nov 2019 07:00  --------------------------------------------------------  IN: 240 mL / OUT: 1625 mL / NET: -1385 mL        PHYSICAL EXAM:  HEAD:  Atraumatic, Normocephalic  NECK: Supple, No   JVD  CHEST/LUNG:   no     rales,     no,    rhonchi  HEART: Regular rate and rhythm;         murmur  ABDOMEN: Soft, Nontender, ;   EXTREMITIES:    no    edema  NEUROLOGY:  alert    LABS:                        10.8   10.00 )-----------( 208      ( 27 Nov 2019 08:24 )             31.6     11-27    134<L>  |  98  |  30<H>  ----------------------------<  198<H>  4.8   |  28  |  0.99    Ca    9.8      27 Nov 2019 07:10    TPro  5.9<L>  /  Alb  4.0  /  TBili  0.4  /  DBili  x   /  AST  15  /  ALT  19  /  AlkPhos  55  11-27                  Hemoglobin A1C, Whole Blood: 7.3 % (11-06 @ 09:05)    Thyroid Stimulating Hormone, Serum: 1.25 uIU/mL (11-06 @ 08:51)          Consultant(s) Notes Reviewed:      Care Discussed with Consultants/Other Providers:

## 2019-11-27 NOTE — PROGRESS NOTE ADULT - SUBJECTIVE AND OBJECTIVE BOX
CARDIOLOGY     PROGRESS  NOTE   ________________________________________________    CHIEF COMPLAINT:Patient is a 84y old  Male who presents with a chief complaint of hypotension/tachycardia (27 Nov 2019 09:16)  doing better.  	  REVIEW OF SYSTEMS:  CONSTITUTIONAL: No fever, weight loss, or fatigue  EYES: No eye pain, visual disturbances, or discharge  ENT:  No difficulty hearing, tinnitus, vertigo; No sinus or throat pain  NECK: No pain or stiffness  RESPIRATORY: No cough, wheezing, chills or hemoptysis; No Shortness of Breath  CARDIOVASCULAR: No chest pain, palpitations, passing out, dizziness, or leg swelling  GASTROINTESTINAL: No abdominal or epigastric pain. No nausea, vomiting, or hematemesis; No diarrhea or constipation. No melena or hematochezia.  GENITOURINARY: No dysuria, frequency, hematuria, or incontinence  NEUROLOGICAL: No headaches, memory loss, loss of strength, numbness, or tremors  SKIN: No itching, burning, rashes, or lesions   LYMPH Nodes: No enlarged glands  ENDOCRINE: No heat or cold intolerance; No hair loss  MUSCULOSKELETAL: No joint pain or swelling; No muscle, back, or extremity pain  PSYCHIATRIC: No depression, anxiety, mood swings, or difficulty sleeping  HEME/LYMPH: No easy bruising, or bleeding gums  ALLERGY AND IMMUNOLOGIC: No hives or eczema	    [ ] All others negative	  [ ] Unable to obtain    PHYSICAL EXAM:  T(C): 36.8 (11-27-19 @ 04:26), Max: 37 (11-26-19 @ 10:30)  HR: 79 (11-27-19 @ 04:26) (79 - 97)  BP: 132/84 (11-27-19 @ 04:26) (114/70 - 149/81)  RR: 18 (11-27-19 @ 04:26) (18 - 18)  SpO2: 97% (11-27-19 @ 04:26) (96% - 98%)  Wt(kg): --  I&O's Summary    26 Nov 2019 07:01  -  27 Nov 2019 07:00  --------------------------------------------------------  IN: 240 mL / OUT: 1625 mL / NET: -1385 mL        Appearance: Normal	  HEENT:   Normal oral mucosa, PERRL, EOMI	  Lymphatic: No lymphadenopathy  Cardiovascular: Normal S1 S2, No JVD, + murmurs, No edema  Respiratory: Lungs clear to auscultation	  Psychiatry: A & O x 3, Mood & affect appropriate  Gastrointestinal:  Soft, Non-tender, + BS	  Skin: No rashes, No ecchymoses, No cyanosis	  Neurologic: Non-focal  Extremities: Normal range of motion, No clubbing, cyanosis or edema  Vascular: Peripheral pulses palpable 2+ bilaterally    MEDICATIONS  (STANDING):  apixaban 5 milliGRAM(s) Oral two times a day  dextrose 5%. 1000 milliLiter(s) (50 mL/Hr) IV Continuous <Continuous>  dextrose 50% Injectable 12.5 Gram(s) IV Push once  dextrose 50% Injectable 25 Gram(s) IV Push once  dextrose 50% Injectable 25 Gram(s) IV Push once  digoxin     Tablet 0.25 milliGRAM(s) Oral daily  erythromycin   Ointment 1 Application(s) Left EYE three times a day  finasteride 5 milliGRAM(s) Oral daily  folic acid 1 milliGRAM(s) Oral daily  gabapentin 300 milliGRAM(s) Oral two times a day  insulin glargine Injectable (LANTUS) 24 Unit(s) SubCutaneous at bedtime  insulin lispro (HumaLOG) corrective regimen sliding scale   SubCutaneous three times a day before meals  insulin lispro (HumaLOG) corrective regimen sliding scale   SubCutaneous at bedtime  insulin lispro Injectable (HumaLOG) 7 Unit(s) SubCutaneous three times a day before meals  metoprolol tartrate 100 milliGRAM(s) Oral daily  metoprolol tartrate 75 milliGRAM(s) Oral at bedtime  pantoprazole    Tablet 40 milliGRAM(s) Oral before breakfast  prednisoLONE acetate 1% Suspension 2 Drop(s) Left EYE four times a day  simvastatin 40 milliGRAM(s) Oral at bedtime  trimethoprim   80 mG/sulfamethoxazole 400 mG 1 Tablet(s) Oral daily      TELEMETRY: 	    ECG:  	  RADIOLOGY:  OTHER: 	  	  LABS:	 	    CARDIAC MARKERS:                                10.8   10.00 )-----------( 208      ( 27 Nov 2019 08:24 )             31.6     11-27    134<L>  |  98  |  30<H>  ----------------------------<  198<H>  4.8   |  28  |  0.99    Ca    9.8      27 Nov 2019 07:10    TPro  5.9<L>  /  Alb  4.0  /  TBili  0.4  /  DBili  x   /  AST  15  /  ALT  19  /  AlkPhos  55  11-27    proBNP:   Lipid Profile:   HgA1c: Hemoglobin A1C, Whole Blood: 7.3 % (11-06 @ 09:05)    TSH: Thyroid Stimulating Hormone, Serum: 1.25 uIU/mL (11-06 @ 08:51)          Assessment and plan  ---------------------------  83 yo male with pmh DM,, Afib, lymphoma s.p chemo in July recent admission for disseminated herpes zoster here for concern og high HR and low BP today. pts wife states pt was c/o dizziness this am, she took his BP today which was low in the 80s and HR was high in the 120s, checked it again and BP was lower , called cardiologist and told him to come to the ED. Pt otherwise with no complaints, has been fatigued since hospitalization and lethargic, otherwise no cough, congestion, abdominal pain, fevers, chills, nausea, vomiting, dysuria.  hypotension/ Tachycardia r/o sepsis. dehydration  tele  ac continue  hold lisinopril for now  physical therapy  increase beta blocker for better hr control, hr is well controlled now  pericardial ,effusion will follow up as out pt  increase blood sugar awaiting endocrine clearance

## 2019-11-27 NOTE — PROGRESS NOTE ADULT - REASON FOR ADMISSION
hypotension/tachycardia

## 2019-11-27 NOTE — CHART NOTE - NSCHARTNOTEFT_GEN_A_CORE
Nutrition Follow Up Note  Patient seen for: malnutrition follow up     Hospital course as per chart: Pt is a 83 yo male with PMH of DM, afib, lymphoma (s/p chemo 7/2019), HTN, HLD, CKD, CAD, diastolic CHF, with recent admission for disseminated herpes zoster who presented with hypotension/tachycardia, admitted 11/22. Pt with continued elevated Glucose fingersticks, awaiting clearance by Endocrine for d/c.     Source: chart review, patient (wife translated), pt's wife at bedside    Diet: consistent carbohydrate (evening snack) + Glucerna 2/day     Patient reports good appetite and intake, consuming ~% of meals + 1-2 Glucerna daily. Wife continues to bring in meals from home. Denies nausea/vomiting/diarrhea/constipation, last BM 11/24 as per flowsheets.     Reviewed consistent carbohydrate diet and importance of carbohydrates. Discussed starting meals with protein and provision of supplemental calories and protein through oral nutrition supplement (Glucerna). Discussed hyper- and hypoglycemia and consistent carbohydrate intake throughout the day. Pt/wife amenable and with no further nutrition related questions at this time. Pt and wife made aware RD remains available.     PO intake: %    Source for PO intake: patient, family (wife)     Enteral /Parenteral Nutrition: N/A     Current Weight/% Weight Change: no new wt, will continue to monitor    Pertinent Medications: MEDICATIONS  (STANDING):  apixaban 5 milliGRAM(s) Oral two times a day  dextrose 5%. 1000 milliLiter(s) (50 mL/Hr) IV Continuous <Continuous>  dextrose 50% Injectable 12.5 Gram(s) IV Push once  dextrose 50% Injectable 25 Gram(s) IV Push once  dextrose 50% Injectable 25 Gram(s) IV Push once  digoxin     Tablet 0.25 milliGRAM(s) Oral daily  erythromycin   Ointment 1 Application(s) Left EYE three times a day  finasteride 5 milliGRAM(s) Oral daily  folic acid 1 milliGRAM(s) Oral daily  gabapentin 300 milliGRAM(s) Oral two times a day  insulin glargine Injectable (LANTUS) 24 Unit(s) SubCutaneous at bedtime  insulin lispro (HumaLOG) corrective regimen sliding scale   SubCutaneous three times a day before meals  insulin lispro (HumaLOG) corrective regimen sliding scale   SubCutaneous at bedtime  insulin lispro Injectable (HumaLOG) 7 Unit(s) SubCutaneous three times a day before meals  metoprolol tartrate 100 milliGRAM(s) Oral daily  metoprolol tartrate 75 milliGRAM(s) Oral at bedtime  pantoprazole    Tablet 40 milliGRAM(s) Oral before breakfast  prednisoLONE acetate 1% Suspension 2 Drop(s) Left EYE four times a day  simvastatin 40 milliGRAM(s) Oral at bedtime  trimethoprim   80 mG/sulfamethoxazole 400 mG 1 Tablet(s) Oral daily    MEDICATIONS  (PRN):  dextrose 40% Gel 15 Gram(s) Oral once PRN Blood Glucose LESS THAN 70 milliGRAM(s)/deciliter  glucagon  Injectable 1 milliGRAM(s) IntraMuscular once PRN Glucose LESS THAN 70 milligrams/deciliter    Pertinent Labs:  11-27 Na134 mmol/L<L> Glu 198 mg/dL<H> K+ 4.8 mmol/L Cr  0.99 mg/dL BUN 30 mg/dL<H> 11-27 Alb 4.0 g/dL 11-06 [id number] 7.3 %<H>  Glucose fingersticks: (11/27) 165, (11/26) 164-417, (11/25) 249-434    Skin: no pressure injuries per flowsheets    Edema: no edema per flowsheets     Estimated Needs: no change since previous assessment    Previous Nutrition Diagnosis: moderate   Nutrition Diagnosis is ongoing - being addressed with PO intake and provision of oral nutrition supplement (Glucerna 2x/day)     New Nutrition Diagnosis: not applicable    Interventions:   Recommend  1) Continue current diet: consistent carbohydrate (evening snack) - monitor/adjust as needed  2) Continue Glucerna 2x/day (285 kcal, 14.2 g protein in each) to optimize PO intake   3) Obtain current weight to assess changes, if any   4) Encouraged PO intake and provided diet education, pt and wife amenable & made aware RD remains available      Monitoring and Evaluation: Monitor PO intake, weight, labs, skin, GI status, diet     RD remains available upon request and will follow up per protocol.   Krystal Brandt, MS, RD, CDN Pager #[phone] Nutrition Follow Up Note  Patient seen for: malnutrition follow up     Hospital course as per chart: Pt is a 83 yo male with PMH of DM, afib, lymphoma (s/p chemo 7/2019), HTN, HLD, CKD, CAD, diastolic CHF, with recent admission for disseminated herpes zoster who presented with hypotension/tachycardia, admitted 11/22. Pt with continued elevated Glucose fingersticks, awaiting clearance by Endocrine for d/c.     Source: chart review, patient (wife translated), pt's wife at bedside    Diet: consistent carbohydrate (evening snack) + Glucerna 2/day     Patient reports good appetite and intake, consuming ~% of meals + 1-2 Glucerna daily. Wife continues to bring in meals from home. Denies nausea/vomiting/diarrhea/constipation, last BM 11/24 as per flowsheets.     Reviewed consistent carbohydrate diet and importance of carbohydrates. Discussed starting meals with protein and provision of supplemental calories and protein through oral nutrition supplement (Glucerna). Discussed hyper- and hypoglycemia and consistent carbohydrate intake throughout the day. Pt/wife amenable and with no further nutrition related questions at this time. Pt and wife made aware RD remains available.     PO intake: %    Source for PO intake: patient, family (wife)     Enteral /Parenteral Nutrition: N/A     Current Weight/% Weight Change: no new wt, will continue to monitor    Pertinent Medications: MEDICATIONS  (STANDING):  apixaban 5 milliGRAM(s) Oral two times a day  dextrose 5%. 1000 milliLiter(s) (50 mL/Hr) IV Continuous <Continuous>  dextrose 50% Injectable 12.5 Gram(s) IV Push once  dextrose 50% Injectable 25 Gram(s) IV Push once  dextrose 50% Injectable 25 Gram(s) IV Push once  digoxin     Tablet 0.25 milliGRAM(s) Oral daily  erythromycin   Ointment 1 Application(s) Left EYE three times a day  finasteride 5 milliGRAM(s) Oral daily  folic acid 1 milliGRAM(s) Oral daily  gabapentin 300 milliGRAM(s) Oral two times a day  insulin glargine Injectable (LANTUS) 24 Unit(s) SubCutaneous at bedtime  insulin lispro (HumaLOG) corrective regimen sliding scale   SubCutaneous three times a day before meals  insulin lispro (HumaLOG) corrective regimen sliding scale   SubCutaneous at bedtime  insulin lispro Injectable (HumaLOG) 7 Unit(s) SubCutaneous three times a day before meals  metoprolol tartrate 100 milliGRAM(s) Oral daily  metoprolol tartrate 75 milliGRAM(s) Oral at bedtime  pantoprazole    Tablet 40 milliGRAM(s) Oral before breakfast  prednisoLONE acetate 1% Suspension 2 Drop(s) Left EYE four times a day  simvastatin 40 milliGRAM(s) Oral at bedtime  trimethoprim   80 mG/sulfamethoxazole 400 mG 1 Tablet(s) Oral daily    MEDICATIONS  (PRN):  dextrose 40% Gel 15 Gram(s) Oral once PRN Blood Glucose LESS THAN 70 milliGRAM(s)/deciliter  glucagon  Injectable 1 milliGRAM(s) IntraMuscular once PRN Glucose LESS THAN 70 milligrams/deciliter    Pertinent Labs:  11-27 Na134 mmol/L<L> Glu 198 mg/dL<H> K+ 4.8 mmol/L Cr  0.99 mg/dL BUN 30 mg/dL<H> 11-27 Alb 4.0 g/dL 11-06 [id number] 7.3 %<H>  Glucose fingersticks: (11/27) 165, (11/26) 164-417, (11/25) 249-434    Skin: no pressure injuries per flowsheets    Edema: no edema per flowsheets     Estimated Needs: no change since previous assessment    Previous Nutrition Diagnosis: moderate malnutrition  Nutrition Diagnosis is ongoing - being addressed with PO intake and provision of oral nutrition supplement (Glucerna 2x/day)     New Nutrition Diagnosis: not applicable    Interventions:   Recommend  1) Continue current diet: consistent carbohydrate (evening snack) - monitor/adjust as needed  2) Continue Glucerna 2x/day (285 kcal, 14.2 g protein in each) to optimize PO intake   3) Obtain current weight to assess changes, if any   4) Encouraged PO intake and provided diet education, pt and wife amenable & made aware RD remains available      Monitoring and Evaluation: Monitor PO intake, weight, labs, skin, GI status, diet     RD remains available upon request and will follow up per protocol.   Krystal Brandt, MS, RD, CDN Pager #[phone]

## 2020-03-24 NOTE — ED ADULT NURSE NOTE - NSFALLRSKASSESASSIST_ED_ALL_ED
August 21, 2020       TO: Marlyn Wilder  [address] [id number]       DearMs.Meño,    We are writing to inform you of your test results.    {p results letter list:995404}    No results found from the In Basket message.    ***       no

## 2020-06-12 ENCOUNTER — APPOINTMENT (OUTPATIENT)
Dept: UROLOGY | Facility: CLINIC | Age: 85
End: 2020-06-12
Payer: MEDICARE

## 2020-06-12 DIAGNOSIS — B02.23 POSTHERPETIC POLYNEUROPATHY: ICD-10-CM

## 2020-06-12 PROCEDURE — 99441: CPT | Mod: 95

## 2020-06-12 NOTE — REVIEW OF SYSTEMS
[Joint Stiffness] : joint stiffness [Constipation] : constipation [Nocturia] : nocturia [Easy Bruising] : a tendency for easy bruising [Easy Bleeding] : a tendency for easy bleeding [Feeling Poorly] : not feeling poorly [Chest Pain] : no chest pain

## 2020-06-12 NOTE — ASSESSMENT
[FreeTextEntry1] : Pt contacted at [phone]. Gave permission to conduct a Telephone visit. \par \par Mr De Souza is an 84 year old man followed for renal insufficiency, BPH and UTI. The patient has polymyalgia rheumatica, which he takes prednisone 3 mg once daily. The patient is on trimethoprim suppression to prevent urinary tract infections, which he has suffered from the past. He is on finasteride 5 mg and most recently Rapaflo 8 mg once daily with food for management of benign prostatic hypertrophy with urinary obstruction.The patient returned and reported that 2 days ago he was lifting heavy items. Today he is unable to raise his arm. At night he reported that he wakes up at night 2-3 times to urinate. The patient has not had any recent urinary tract infections. The patient will continue taking finasteride and trimethoprim as previously instructed. The patient denied any gross hematuria or dysuria. \par 5/23/18: The patient returned and reported his creatinine from 3/29/18 was 1.55 mg/dL. GFR from 3/29/18 was 43 mL/min and glucose was 277 mg/dL. Noted he consulted with nephrologist and was told to drink more water.  Wakes up 4 times during the night to urinate. Currently taking finasteride 5 mg. \par 6/6/18: The patient returned and reported Alfuzosin did not improve his nocturia. Wakes up 3-4 times during the night to urinate. Noted he urinates more often when he sits down during the day. PSA from 5/23/18 was 1.13 ng/mL. Currently taking Finasteride and Trimethoprim. \par 7/3/18: The patient returned and reported he was recently diagnosed with myelodysplastic disease, MDS. Noted he sees improvement with nocturia while on Trospium. Current nocturia x 3 compared to 6-7 times in the past. I prescribed Myrbetriq as well but he has not tried it yet, but will try after finishing his Trospium dosage. Currently taking trimethoprim and trospium. Discontinued use of Finasteride. \par 7/31/18: The patient returned and reported his urination has improved and has been taking Myrbetriq for the past 3 weeks. Wife noted he increased his water intake. Patient denied dysuria, gross hematuria, urgency, or incontinence. Wakes up 2-3 times during the night to urinate. Creatinine from 5/23/18 was 1.63 mg/dL.\par 8/14/18: Patient recently received erythropoietin. The patient returned and reported he has small red spots, not sure if from mosquitos or Colace. Discontinued use of Colace until skin is clear. Creatinine from 7/31/18 was 1.36 mg/dL. Patient had renal US today and findings showed bilateral renal cysts. Largest cyst on right kidney is 1.4 cm  in the lower pole. Largest cyst in the left kidney is 1.4 cm in the upper pole. Left kidney 11.5 cm and right kidney 11.2 cm. No hydronephrosis no renal calculi. \par 12/24/18: The patient returned today and reported urination is adequate while taking Finasteride and Trimethoprim. Urinates 5-6 times during the day. Wakes up twice during the night to urinate. Discontinued use of Myrbetriq. Complains of mild urinary urgency. Patient denies gross hematuria. Hasn't had any UTIs recently. His sugar has been high lately. Glucose from 8/14/18 was 164 mg/dL. Wife notes that the patient has a picky diet. Creatinine from 8/14/18 was 1.29 mg/dL. Currently takes Prednisone 3 mg daily for his polymyalgia rheumatica.\par 10/8/19: Patient presents today for a follow up. Since his last visit it is reported that he developed Lymphoma. It has been treated and he is currently in remission. Regarding his urination, he continues to take Finasteride and Trimethoprim as directed. Nocturia x 5-6 is reported. During the day his urination is better but is still bothersome. If he does not go directly to the bathroom when he first gets the urge to urinate, urgency incontinence does occur. Tamsulosin was prescribed previously but it caused him to experience significant light headedness. The patient produced a urine sample which will be sent for urinalysis, urine cytology and urine culture. Blood work today included comprehensive metabolic panel, CBC, PSA and testosterone. Trimethoprim and Finasteride are to be continued as directed at this time. Silodosin 8 mg is prescribed at this time. i believe this will decrease the frequency of nocturia. He is to follow up in 2 weeks or sooner if clinically indicated. \par \par 06/12/2020: Pt contacted at [phone]. Gave permission to conduct a Telephone visit. On 10/08/2019, pt was on Finasteride and Trimethoprim to decrease obstruction and prevent UTI. Tried Tamsulosin but that caused lightheadedness. At his last visit, Silodosin was prescribed. 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 His vision was also impaired due to the virus and his eyes were red and swollen. He was bed bound for November and December. Lymphoma remains in remission. Valacyclovir for few months 4x daily, tapering off slowly. He has improved his vision, but it is still poor. Pt is back on insulin. He has recently started Alfuzosin qhs and it is helping him. Pt is currently doing well. \par \par Advised pt to make an appoint for mid to late August, when COVID-19 has waned somewhat. \par \par Preparation, visit and coordination of care took !0 minutes.

## 2020-06-12 NOTE — ADDENDUM
[FreeTextEntry1] : This note was authored by Karma Bill working as scribe for Dr. Higinio Stark. I, Dr. Higinio Stark, have reviewed the content of this note and confirm it is true and accurate. I personally performed the history and physical examination and made all the decisions.\par 06/12/2020

## 2020-06-12 NOTE — HISTORY OF PRESENT ILLNESS
[FreeTextEntry1] : Pt contacted at [phone]. Gave permission to conduct a Telephone visit. \par \par Mr De Souza is an 84 year old man followed for renal insufficiency, BPH and UTI. The patient has polymyalgia rheumatica, which he takes prednisone 3 mg once daily. The patient is on trimethoprim suppression to prevent urinary tract infections, which he has suffered from the past. He is on finasteride 5 mg and most recently Rapaflo 8 mg once daily with food for management of benign prostatic hypertrophy with urinary obstruction.The patient returned and reported that 2 days ago he was lifting heavy items. Today he is unable to raise his arm. At night he reported that he wakes up at night 2-3 times to urinate. The patient has not had any recent urinary tract infections. The patient will continue taking finasteride and trimethoprim as previously instructed. The patient denied any gross hematuria or dysuria. \par 5/23/18: The patient returned and reported his creatinine from 3/29/18 was 1.55 mg/dL. GFR from 3/29/18 was 43 mL/min and glucose was 277 mg/dL. Noted he consulted with nephrologist and was told to drink more water.  Wakes up 4 times during the night to urinate. Currently taking finasteride 5 mg. \par 6/6/18: The patient returned and reported Alfuzosin did not improve his nocturia. Wakes up 3-4 times during the night to urinate. Noted he urinates more often when he sits down during the day. PSA from 5/23/18 was 1.13 ng/mL. Currently taking Finasteride and Trimethoprim. \par 7/3/18: The patient returned and reported he was recently diagnosed with myelodysplastic disease, MDS. Noted he sees improvement with nocturia while on Trospium. Current nocturia x 3 compared to 6-7 times in the past. I prescribed Myrbetriq as well but he has not tried it yet, but will try after finishing his Trospium dosage. Currently taking trimethoprim and trospium. Discontinued use of Finasteride. \par 7/31/18: The patient returned and reported his urination has improved and has been taking Myrbetriq for the past 3 weeks. Wife noted he increased his water intake. Patient denied dysuria, gross hematuria, urgency, or incontinence. Wakes up 2-3 times during the night to urinate. Creatinine from 5/23/18 was 1.63 mg/dL.\par 8/14/18: Patient recently received erythropoietin. The patient returned and reported he has small red spots, not sure if from mosquitos or Colace. Discontinued use of Colace until skin is clear. Creatinine from 7/31/18 was 1.36 mg/dL. Patient had renal US today and findings showed bilateral renal cysts. Largest cyst on right kidney is 1.4 cm  in the lower pole. Largest cyst in the left kidney is 1.4 cm in the upper pole. Left kidney 11.5 cm and right kidney 11.2 cm. No hydronephrosis no renal calculi. \par 12/24/18: The patient returned today and reported urination is adequate while taking Finasteride and Trimethoprim. Urinates 5-6 times during the day. Wakes up twice during the night to urinate. Discontinued use of Myrbetriq. Complains of mild urinary urgency. Patient denies gross hematuria. Hasn't had any UTIs recently. His sugar has been high lately. Glucose from 8/14/18 was 164 mg/dL. Wife notes that the patient has a picky diet. Creatinine from 8/14/18 was 1.29 mg/dL. Currently takes Prednisone 3 mg daily for his polymyalgia rheumatica.\par \par 10/8/19: Patient presents today for a follow up. Since his last visit it is reported that he developed Lymphoma. It has been treated and he is currently in remission. Regarding his urination, he continues to take Finasteride and Trimethoprim as directed. Nocturia x 5-6 is reported. During the day his urination is better but is still bothersome. If he does not go directly to the bathroom when he first gets the urge to urinate, urgency incontinence does occur. Tamsulosin was prescribed previously but it caused him to experience significant light headedness. \par \par 06/12/2020: Pt contacted at [phone]. Gave permission to conduct a Telephone visit. On 10/08/2019, pt was on Finasteride and Trimethoprim to decrease obstruction and prevent UTI. PSA was 0.48, Testosterone 65.7 and Creatinine was 0.99. Tried Tamsulosin but that caused lightheadedness. At his last visit, Silodosin was prescribed. 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 His vision was also impaired due to the virus and his eyes were red and swollen. He was bed bound for November and December. Lymphoma remains in remission. Valacyclovir for few months 4x daily, tapering off slowly. He has improved his vision, but it is still poor. Pt is back on insulin. He has recently started Alfuzosin qhs and it is helping him. Pt is currently doing well.

## 2020-06-12 NOTE — PHYSICAL EXAM
[Abdomen Tenderness] : non-tender [Abdomen Soft] : soft [Costovertebral Angle Tenderness] : no ~M costovertebral angle tenderness [Edema] : no peripheral edema [FreeTextEntry1] : \par

## 2020-10-28 NOTE — BEHAVIORAL HEALTH ASSESSMENT NOTE - NS ED [facility] MED ROS NEUROLOGICAL
No complaints Hydroxyzine Counseling: Patient advised that the medication is sedating and not to drive a car after taking this medication.  Patient informed of potential adverse effects including but not limited to dry mouth, urinary retention, and blurry vision.  The patient verbalized understanding of the proper use and possible adverse effects of hydroxyzine.  All of the patient's questions and concerns were addressed.

## 2021-02-12 NOTE — CONSULT NOTE ADULT - SUBJECTIVE AND OBJECTIVE BOX
[No Rash or Lesion] : No rash or lesion CHIEF COMPLAINT:Patient is a 84y old  Male who presents with a chief complaint of zoster (05 Nov 2019 09:00)      HPI:  Pt   c/o   feeling very tired, fatigued, and    with  3 days of rash across his L face/ey . pt was in [place] ,  (pt came straight from airport)    . Pt was evaluated by eye doctor a few days ago due to eye redness and was told he had conjunctivitis and given eye drops. his generalized symptoms worsened and then he developed a rash around his L face and eye and went back to eye doctor and was told he had shingles and was prescribed acyclovir drops. pt was "cleared" to fly back to [place] but symptoms worsened  pt with hx of a.fib, htn and DM and ? hx of chf.  no chest pain . + ruiz.      PAST MEDICAL & SURGICAL HISTORY:  Atrial fibrillation  Anemia  DM2 (diabetes mellitus, type 2)  Osteoporosis  GERD (gastroesophageal reflux disease)  Benign prostatic hypertrophy  HTN (hypertension)  No significant past surgical history      MEDICATIONS  (STANDING):  acyclovir IVPB 750 milliGRAM(s) IV Intermittent every 8 hours  apixaban 5 milliGRAM(s) Oral two times a day  artificial tears (preservative free) Ophthalmic Solution 1 Drop(s) Left EYE every 2 hours  cyclopentolate 1% Solution 1 Drop(s) Left EYE three times a day  erythromycin   Ointment 1 Application(s) Left EYE three times a day  finasteride 5 milliGRAM(s) Oral daily  furosemide    Tablet 40 milliGRAM(s) Oral <User Schedule>  furosemide    Tablet 20 milliGRAM(s) Oral <User Schedule>  lisinopril 2.5 milliGRAM(s) Oral daily  metoprolol tartrate 100 milliGRAM(s) Oral daily  metoprolol tartrate 50 milliGRAM(s) Oral daily  pantoprazole    Tablet 40 milliGRAM(s) Oral before breakfast  prednisoLONE acetate 1% Suspension 2 Drop(s) Left EYE four times a day    MEDICATIONS  (PRN):  acetaminophen   Tablet .. 650 milliGRAM(s) Oral every 6 hours PRN Temp greater or equal to 38C (100.4F), Mild Pain (1 - 3)      FAMILY HISTORY:      SOCIAL HISTORY:    [ ] Non-smoker  [ ] Smoker  [ ] Alcohol    Allergies    No Known Allergies    Intolerances    	    REVIEW OF SYSTEMS:  CONSTITUTIONAL: No fever, weight loss, or fatigue  EYES: No eye pain, visual disturbances, or discharge . +rash  ENT:  No difficulty hearing, tinnitus, vertigo; No sinus or throat pain  NECK: No pain or stiffness  RESPIRATORY: No cough, wheezing, chills or hemoptysis; + Shortness of Breath  CARDIOVASCULAR: No chest pain, palpitations, passing out, dizziness, or leg swelling  GASTROINTESTINAL: No abdominal or epigastric pain. No nausea, vomiting, or hematemesis; No diarrhea or constipation. No melena or hematochezia.  GENITOURINARY: No dysuria, frequency, hematuria, or incontinence  NEUROLOGICAL: No headaches, memory loss, loss of strength, numbness, or tremors  SKIN: No itching, burning, rashes, or lesions   LYMPH Nodes: No enlarged glands  ENDOCRINE: No heat or cold intolerance; No hair loss  MUSCULOSKELETAL: No joint pain or swelling; No muscle, back, or extremity pain  PSYCHIATRIC: No depression, anxiety, mood swings, or difficulty sleeping  HEME/LYMPH: No easy bruising, or bleeding gums  ALLERGY AND IMMUNOLOGIC: No hives or eczema	    [ ] All others negative	  [ ] Unable to obtain    PHYSICAL EXAM:  T(C): 37.1 (11-05-19 @ 05:36), Max: 39.4 (11-05-19 @ 00:42)  HR: 118 (11-05-19 @ 05:36) (108 - 177)  BP: 128/67 (11-05-19 @ 05:36) (110/55 - 164/78)  RR: 20 (11-05-19 @ 05:36) (16 - 20)  SpO2: 99% (11-05-19 @ 05:36) (97% - 99%)  Wt(kg): --  I&O's Summary    04 Nov 2019 07:01  -  05 Nov 2019 07:00  --------------------------------------------------------  IN: 0 mL / OUT: 200 mL / NET: -200 mL        Appearance: Normal	  HEENT:   Normal oral mucosa, PERRL, EOMI, + rash	  Lymphatic: No lymphadenopathy  Cardiovascular: Normal S1 S2, No JVD, + murmurs, No edema  Respiratory: Lungs clear to auscultation	  Psychiatry: A & O x 3, Mood & affect appropriate  Gastrointestinal:  Soft, Non-tender, + BS	  Skin: No rashes, No ecchymoses, No cyanosis	  Neurologic: Non-focal  Extremities: Normal range of motion, No clubbing, cyanosis or edema  Vascular: Peripheral pulses palpable 2+ bilaterally    TELEMETRY: 	    ECG:  	  RADIOLOGY:  OTHER: 	  	  LABS:	 	    CARDIAC MARKERS:                              11.0   5.09  )-----------( 165      ( 05 Nov 2019 01:07 )             31.3     11-05    129<L>  |  92<L>  |  23  ----------------------------<  167<H>  4.1   |  21<L>  |  1.10    Ca    8.7      05 Nov 2019 01:07    TPro  6.4  /  Alb  4.2  /  TBili  0.8  /  DBili  x   /  AST  21  /  ALT  29  /  AlkPhos  60  11-05    proBNP:   Lipid Profile:   HgA1c:   TSH:   PT/INR - ( 05 Nov 2019 01:07 )   PT: 15.4 sec;   INR: 1.33 ratio         PTT - ( 05 Nov 2019 01:07 )  PTT:31.5 sec    PREVIOUS DIAGNOSTIC TESTING:      < from: Transthoracic Echocardiogram (06.05.19 @ 10:32) >  Mitral Valve: Mitral annular calcification, otherwise  normal mitral valve. Mild-moderate mitral regurgitation.  Aortic Valve/Aorta: Normal trileaflet aortic valve.  Aortic Root: 3.3 cm.  Left Atrium: Moderately dilated left atrium.  LA volume  index =46 cc/m2.  Left Ventricle: Endocardium not well visualized; grossly  normal left ventricular systolic function. Normal left  ventricular internal dimensions and wall thicknesses.  Right Heart: Normal right atrium. Normal right ventricular  size and function. Normal tricuspid valve. Minimal  tricuspid regurgitation. Normal pulmonic valve. No pulmonic  regurgitation.  Pericardium/Pleura: Small pericardial effusion.  ------------------------------------------------------------------------  Conclusions:  1. Endocardium not well visualized; grossly normal left  ventricular systolic function.  2. Normal right ventricular size and function.  3. Normal tricuspid valve. Minimal tricuspid regurgitation.  4. Small pericardial effusion.    < from: CT Chest No Cont (06.02.19 @ 20:18) >  IMPRESSION:Small bilateral pleural effusions, left greater than right.    Interlobular septal thickening representing pulmonary edema.    Small pericardial effusion.    Coronary artery atherosclerotic disease.    < from: Xray Chest 1 View- PORTABLE-Urgent (11.05.19 @ 01:01) >  INTERPRETATION:  clear lungs [JVD] : no jugular venous distention  [Purpura] : no purpura  [Petechiae] : no petechiae [Skin Ulcer] : no ulcer [Skin Induration] : no induration [Alert] : alert [Oriented to Person] : oriented to person [Oriented to Place] : oriented to place [Oriented to Time] : oriented to time [Calm] : calm [de-identified] : non toxic, in no acute distress  [de-identified] : trachea midline, no gross mass  [de-identified] : NC/AT PERRL EOMI no scleral icterus  [de-identified] : no audible wheezing or stridor  [de-identified] : soft, non tender, no guarding, no rebound, no masses  [de-identified] : phallus normal, no gross testicular mass or tenderness  [de-identified] : FROM of all extremities without gross deformity or angulation, there remains no evidence of recurrent left inguinal hernia, no right inguinal hernia on exam  [de-identified] : surgical incisions are healed, there remains an area of mild erythema and induration of the left lower back without gross pus or fluctuance  [de-identified] : mood is calm

## 2021-12-02 NOTE — DIETITIAN INITIAL EVALUATION ADULT. - 30 CAL FROM
2001 Mustarde Flap Text: The defect edges were debeveled with a #15 scalpel blade.  Given the size, depth and location of the defect and the proximity to free margins a Mustarde flap was deemed most appropriate.  Using a sterile surgical marker, an appropriate flap was drawn incorporating the defect. The area thus outlined was incised with a #15 scalpel blade.  The skin margins were undermined to an appropriate distance in all directions utilizing iris scissors.

## 2021-12-02 NOTE — ED ADULT NURSE NOTE - NSFALLRSKINDICATORS_ED_ALL_ED
[FreeTextEntry1] : pt advised to increase Calcium and D and if improved D on labs consider bisphosphonate\par  yes

## 2022-01-19 NOTE — ED ADULT TRIAGE NOTE - HEIGHT IN INCHES
Chief complaint:   Chief Complaint   Patient presents with   • Other     covid positive on 1-5-2022, she states she continues with the heavy breathing, runny nose, and her cough is semi productive       Vitals:  Visit Vitals  /82   Pulse 96   Temp 98.8 °F (37.1 °C) (Oral)   Wt 82.1 kg (181 lb)   SpO2 99%       HISTORY OF PRESENT ILLNESS     No self tx for current sxs. Sxs x 2 weeks . Denies current pregnancy or current breast feeding.       Other significant problems:  Patient Active Problem List    Diagnosis Date Noted   • Environmental allergies 12/20/2018     Priority: Low       PAST MEDICAL, FAMILY AND SOCIAL HISTORY     Medications:  Current Outpatient Medications   Medication   • amphetamine-dextroamphetamine (ADDERALL XR) 30 MG 24 hr capsule   • amphetamine-dextroamphetamine (Adderall) 20 MG tablet   • Vitamin D, Ergocalciferol, 1.25 mg (50,000 units) capsule   • FLUoxetine (PROzac) 10 MG capsule   • levonorgestrel (Mirena, 52 MG,) (52 MG) 20 MCG/DAY intrauterine device   • azithromycin (Zithromax Z-Bobby) 250 MG tablet   • methylPREDNISolone (MEDROL DOSEPAK) 4 MG tablet   • fluconazole (Diflucan) 150 MG tablet     No current facility-administered medications for this visit.       Allergies:  ALLERGIES:   Allergen Reactions   • Clotrimazole SWELLING     Swelling of tongue   • Sparks Other (See Comments)     Maple, hickory- causes sinus issues   • Penicillins RASH   • Weeds Other (See Comments)     Russian thistle, water hemp, pig weed- causes sinus issues   • Dust Mite Extract Other (See Comments)     Causes sinus issues  Causes sinus issues       Past Medical  History/Surgeries:  Past Medical History:   Diagnosis Date   • ADHD    • Failed moderate sedation during procedure     wakes up crying   • Lyme disease 2008   • Recurrent otitis media    • Whooping cough 2012       Past Surgical History:   Procedure Laterality Date   • Adenoidectomy     • Bunionectomy Left    • Foot surgery Left 12/30/2020    1.  Left bunionectomy with 1st metatarsal osteotomy 2. Left 2nd metatarsal osteotomy 3. Left 5th metatarsal osteotomy 4. Left 2nd toe fusion   • Hammer toe crest pad     • Myringotomy w/ tubes  11/15/2016   • Tonsillectomy and adenoidectomy     • Tympanostomy tube placement  05/2013    Kd Fonseca MD    • Tympanostomy tube placement Bilateral 04/17/2018    Kd Fonseca MD   • Tympanostomy tube placement Bilateral 04/11/2019    Kd Fonseca MD       Family History:  Family History   Problem Relation Age of Onset   • Diabetes Father    • Patient is unaware of any medical problems Mother    • Hyperlipidemia Maternal Grandmother    • Cancer, Lung Paternal Grandmother    • Hyperlipidemia Maternal Grandfather    • Hypertension Maternal Grandfather    • Diabetes Paternal Grandfather    • Patient is unaware of any medical problems Half-Brother    • Patient is unaware of any medical problems Half-Sister    • Patient is unaware of any medical problems Half-Sister        Social History:  Social History     Tobacco Use   • Smoking status: Never Smoker   • Smokeless tobacco: Never Used   Substance Use Topics   • Alcohol use: No       REVIEW OF SYSTEMS     Review of Systems   Constitutional: Positive for fatigue.   HENT: Positive for congestion, postnasal drip, rhinorrhea, sinus pressure, sinus pain and sore throat.    Respiratory: Positive for cough.    Neurological: Positive for headaches.   All other systems reviewed and are negative.      PHYSICAL EXAM     Physical Exam  Vitals and nursing note reviewed.   Constitutional:       General: She is not in acute distress.     Appearance: She is not ill-appearing, toxic-appearing or diaphoretic.   HENT:      Nose: Congestion and rhinorrhea present. No nasal deformity or septal deviation. Rhinorrhea is purulent.      Right Nostril: No foreign body or epistaxis.      Left Nostril: No foreign body.      Mouth/Throat:      Lips: Pink. No lesions.      Mouth: Mucous  membranes are moist. No injury, lacerations, oral lesions or angioedema.      Dentition: No dental abscesses.      Tongue: No lesions. Tongue does not deviate from midline.      Palate: No mass and lesions.      Pharynx: Oropharyngeal exudate and posterior oropharyngeal erythema present. No pharyngeal swelling or uvula swelling.      Tonsils: No tonsillar exudate or tonsillar abscesses.   Eyes:      General: No scleral icterus.     Conjunctiva/sclera: Conjunctivae normal.      Comments: No jaundice under fluorescent lighting.   Cardiovascular:      Rate and Rhythm: Normal rate and regular rhythm.      Heart sounds: Normal heart sounds.   Pulmonary:      Effort: Pulmonary effort is normal.      Breath sounds: Normal breath sounds and air entry.   Skin:     General: Skin is warm and dry.      Capillary Refill: Capillary refill takes less than 2 seconds.      Coloration: Skin is not ashen, cyanotic, jaundiced, mottled, pale or sallow.      Findings: No rash.   Neurological:      Mental Status: She is alert and oriented to person, place, and time.   Psychiatric:         Attention and Perception: Attention normal.         Mood and Affect: Mood and affect normal.         Speech: Speech normal.         Behavior: Behavior is cooperative.         Cognition and Memory: Memory normal.         ASSESSMENT/PLAN     Acute non-recurrent sinusitis, unspecified location  (primary encounter diagnosis)  Cough dur to sinus drainage.   COVID-19 diagnosis two weeks ago.  Orders Placed This Encounter   • azithromycin (Zithromax Z-Bobby) 250 MG tablet     Si tablets day one, then 1 tablet days 2-5     Dispense:  6 tablet     Refill:  0   • methylPREDNISolone (MEDROL DOSEPAK) 4 MG tablet     Sig: Take 1 tablet by mouth as directed. follow package directions     Dispense:  21 tablet     Refill:  0   Educational handout(s) with Patient Instructions provided through today's AVS.  Excuse/note provided; see Letter for details.  Questions were  answered to the patient's satisfaction.     0

## 2022-02-02 NOTE — PHYSICAL THERAPY INITIAL EVALUATION ADULT - TRANSFER SAFETY CONCERNS NOTED: SIT/STAND, REHAB EVAL
Patient requests all Lab, Cardiology, and Radiology Results on their Discharge Instructions losing balance/decreased sequencing ability/decreased balance during turns/decreased step length/decreased weight-shifting ability

## 2022-02-23 NOTE — PHYSICAL THERAPY INITIAL EVALUATION ADULT - GAIT DISTANCE, PT EVAL
Spoke to pt she is aware of referral being faxed to place of service pt is advised to check with the office of service     150 feet

## 2022-09-01 ENCOUNTER — APPOINTMENT (OUTPATIENT)
Dept: UROLOGY | Facility: CLINIC | Age: 87
End: 2022-09-01

## 2022-09-01 PROCEDURE — 99441: CPT | Mod: 95

## 2022-09-03 LAB
ALP BLD-CCNC: 66 U/L
ANION GAP SERPL CALC-SCNC: 14 MMOL/L
APPEARANCE: CLEAR
BACTERIA: NEGATIVE
BASOPHILS # BLD AUTO: 0.04 K/UL
BASOPHILS NFR BLD AUTO: 0.7 %
BILIRUBIN URINE: NEGATIVE
BLOOD URINE: NEGATIVE
BUN SERPL-MCNC: 20 MG/DL
CALCIUM SERPL-MCNC: 9.2 MG/DL
CHLORIDE SERPL-SCNC: 100 MMOL/L
CO2 SERPL-SCNC: 26 MMOL/L
COLOR: YELLOW
CREAT SERPL-MCNC: 1.35 MG/DL
EGFR: 51 ML/MIN/1.73M2
EOSINOPHIL # BLD AUTO: 0.04 K/UL
EOSINOPHIL NFR BLD AUTO: 0.7 %
ESTIMATED AVERAGE GLUCOSE: 143 MG/DL
GLUCOSE QUALITATIVE U: NEGATIVE
GLUCOSE SERPL-MCNC: 190 MG/DL
HBA1C MFR BLD HPLC: 6.6 %
HCT VFR BLD CALC: 32.7 %
HGB BLD-MCNC: 10.9 G/DL
HYALINE CASTS: 0 /LPF
IMM GRANULOCYTES NFR BLD AUTO: 0.2 %
KETONES URINE: NEGATIVE
LEUKOCYTE ESTERASE URINE: NEGATIVE
LYMPHOCYTES # BLD AUTO: 1.48 K/UL
LYMPHOCYTES NFR BLD AUTO: 27.6 %
MAN DIFF?: NORMAL
MCHC RBC-ENTMCNC: 31.1 PG
MCHC RBC-ENTMCNC: 33.3 GM/DL
MCV RBC AUTO: 93.2 FL
MICROSCOPIC-UA: NORMAL
MONOCYTES # BLD AUTO: 0.59 K/UL
MONOCYTES NFR BLD AUTO: 11 %
NEUTROPHILS # BLD AUTO: 3.21 K/UL
NEUTROPHILS NFR BLD AUTO: 59.8 %
NITRITE URINE: NEGATIVE
PH URINE: 6.5
PLATELET # BLD AUTO: 180 K/UL
POTASSIUM SERPL-SCNC: 4.8 MMOL/L
PROTEIN URINE: NEGATIVE
PSA FREE FLD-MCNC: 21 %
PSA FREE SERPL-MCNC: 0.02 NG/ML
PSA SERPL-MCNC: 0.1 NG/ML
RBC # BLD: 3.51 M/UL
RBC # FLD: 13.1 %
RED BLOOD CELLS URINE: 1 /HPF
SODIUM SERPL-SCNC: 139 MMOL/L
SPECIFIC GRAVITY URINE: 1.01
SQUAMOUS EPITHELIAL CELLS: 5 /HPF
UROBILINOGEN URINE: NORMAL
WBC # FLD AUTO: 5.37 K/UL
WHITE BLOOD CELLS URINE: 1 /HPF

## 2022-09-04 NOTE — REVIEW OF SYSTEMS
[Constipation] : constipation [Nocturia] : nocturia [Joint Stiffness] : joint stiffness [Easy Bleeding] : a tendency for easy bleeding [Easy Bruising] : a tendency for easy bruising [Feeling Poorly] : not feeling poorly [Chest Pain] : no chest pain

## 2022-09-04 NOTE — HISTORY OF PRESENT ILLNESS
[FreeTextEntry1] : Pt contacted at [phone]. Gave permission to conduct a Telephone visit. \par \par Mr De Souza is an 87 year old man followed for renal insufficiency, BPH and UTI. The patient has polymyalgia rheumatica, which he takes prednisone 3 mg once daily. The patient is on trimethoprim suppression to prevent urinary tract infections, which he has suffered from the past. He is on finasteride 5 mg and most recently Rapaflo 8 mg once daily with food for management of benign prostatic hypertrophy with urinary obstruction.The patient returned and reported that 2 days ago he was lifting heavy items. Today he is unable to raise his arm. At night he reported that he wakes up at night 2-3 times to urinate. The patient has not had any recent urinary tract infections. The patient will continue taking finasteride and trimethoprim as previously instructed. The patient denied any gross hematuria or dysuria. \par 5/23/18: The patient returned and reported his creatinine from 3/29/18 was 1.55 mg/dL. GFR from 3/29/18 was 43 mL/min and glucose was 277 mg/dL. Noted he consulted with nephrologist and was told to drink more water.  Wakes up 4 times during the night to urinate. Currently taking finasteride 5 mg. \par 6/6/18: The patient returned and reported Alfuzosin did not improve his nocturia. Wakes up 3-4 times during the night to urinate. Noted he urinates more often when he sits down during the day. PSA from 5/23/18 was 1.13 ng/mL. Currently taking Finasteride and Trimethoprim. \par 7/3/18: The patient returned and reported he was recently diagnosed with myelodysplastic disease, MDS. Noted he sees improvement with nocturia while on Trospium. Current nocturia x 3 compared to 6-7 times in the past. I prescribed Myrbetriq as well but he has not tried it yet, but will try after finishing his Trospium dosage. Currently taking trimethoprim and trospium. Discontinued use of Finasteride. \par 7/31/18: The patient returned and reported his urination has improved and has been taking Myrbetriq for the past 3 weeks. Wife noted he increased his water intake. Patient denied dysuria, gross hematuria, urgency, or incontinence. Wakes up 2-3 times during the night to urinate. Creatinine from 5/23/18 was 1.63 mg/dL.\par 8/14/18: Patient recently received erythropoietin. The patient returned and reported he has small red spots, not sure if from mosquitos or Colace. Discontinued use of Colace until skin is clear. Creatinine from 7/31/18 was 1.36 mg/dL. Patient had renal US today and findings showed bilateral renal cysts. Largest cyst on right kidney is 1.4 cm  in the lower pole. Largest cyst in the left kidney is 1.4 cm in the upper pole. Left kidney 11.5 cm and right kidney 11.2 cm. No hydronephrosis no renal calculi. \par 12/24/18: The patient returned today and reported urination is adequate while taking Finasteride and Trimethoprim. Urinates 5-6 times during the day. Wakes up twice during the night to urinate. Discontinued use of Myrbetriq. Complains of mild urinary urgency. Patient denies gross hematuria. Hasn't had any UTIs recently. His sugar has been high lately. Glucose from 8/14/18 was 164 mg/dL. Wife notes that the patient has a picky diet. Creatinine from 8/14/18 was 1.29 mg/dL. Currently takes Prednisone 3 mg daily for his polymyalgia rheumatica.\par \par 10/8/19: Patient presents today for a follow up. Since his last visit it is reported that he developed Lymphoma. It has been treated and he is currently in remission. Regarding his urination, he continues to take Finasteride and Trimethoprim as directed. Nocturia x 5-6 is reported. During the day his urination is better but is still bothersome. If he does not go directly to the bathroom when he first gets the urge to urinate, urgency incontinence does occur. Tamsulosin was prescribed previously but it caused him to experience significant light headedness. \par \par 06/12/2020: Pt contacted at [phone]. Gave permission to conduct a Telephone visit. On 10/08/2019, pt was on Finasteride and Trimethoprim to decrease obstruction and prevent UTI. PSA was 0.48, Testosterone 65.7 and Creatinine was 0.99. Tried Tamsulosin but that caused lightheadedness. At his last visit, Silodosin was prescribed. 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 His vision was also impaired due to the virus and his eyes were red and swollen. He was bed bound for November and December. Lymphoma remains in remission. Valacyclovir for few months 4x daily, tapering off slowly. He has improved his vision, but it is still poor. Pt is back on insulin. He has recently started Alfuzosin qhs and it is helping him. Pt is currently doing well. \par \par 09/01/2022: The patient and his wife presents today for a follow up telephone visit. I have not spoken to the patient since June 2020. His wife answered the phone and reports that the patient has been doing very well. He is interested in re-establishing his urologic care.

## 2022-09-04 NOTE — ASSESSMENT
[FreeTextEntry1] : Pt contacted at [phone]. Gave permission to conduct a Telephone visit. \par \par Mr De Souza is an 87 year old man followed for renal insufficiency, BPH and UTI. The patient has polymyalgia rheumatica, which he takes prednisone 3 mg once daily. The patient is on trimethoprim suppression to prevent urinary tract infections, which he has suffered from the past. He is on finasteride 5 mg and most recently Rapaflo 8 mg once daily with food for management of benign prostatic hypertrophy with urinary obstruction.The patient returned and reported that 2 days ago he was lifting heavy items. Today he is unable to raise his arm. At night he reported that he wakes up at night 2-3 times to urinate. The patient has not had any recent urinary tract infections. The patient will continue taking finasteride and trimethoprim as previously instructed. The patient denied any gross hematuria or dysuria. \par 5/23/18: The patient returned and reported his creatinine from 3/29/18 was 1.55 mg/dL. GFR from 3/29/18 was 43 mL/min and glucose was 277 mg/dL. Noted he consulted with nephrologist and was told to drink more water.  Wakes up 4 times during the night to urinate. Currently taking finasteride 5 mg. \par 6/6/18: The patient returned and reported Alfuzosin did not improve his nocturia. Wakes up 3-4 times during the night to urinate. Noted he urinates more often when he sits down during the day. PSA from 5/23/18 was 1.13 ng/mL. Currently taking Finasteride and Trimethoprim. \par 7/3/18: The patient returned and reported he was recently diagnosed with myelodysplastic disease, MDS. Noted he sees improvement with nocturia while on Trospium. Current nocturia x 3 compared to 6-7 times in the past. I prescribed Myrbetriq as well but he has not tried it yet, but will try after finishing his Trospium dosage. Currently taking trimethoprim and trospium. Discontinued use of Finasteride. \par 7/31/18: The patient returned and reported his urination has improved and has been taking Myrbetriq for the past 3 weeks. Wife noted he increased his water intake. Patient denied dysuria, gross hematuria, urgency, or incontinence. Wakes up 2-3 times during the night to urinate. Creatinine from 5/23/18 was 1.63 mg/dL.\par 8/14/18: Patient recently received erythropoietin. The patient returned and reported he has small red spots, not sure if from mosquitos or Colace. Discontinued use of Colace until skin is clear. Creatinine from 7/31/18 was 1.36 mg/dL. Patient had renal US today and findings showed bilateral renal cysts. Largest cyst on right kidney is 1.4 cm  in the lower pole. Largest cyst in the left kidney is 1.4 cm in the upper pole. Left kidney 11.5 cm and right kidney 11.2 cm. No hydronephrosis no renal calculi. \par 12/24/18: The patient returned today and reported urination is adequate while taking Finasteride and Trimethoprim. Urinates 5-6 times during the day. Wakes up twice during the night to urinate. Discontinued use of Myrbetriq. Complains of mild urinary urgency. Patient denies gross hematuria. Hasn't had any UTIs recently. His sugar has been high lately. Glucose from 8/14/18 was 164 mg/dL. Wife notes that the patient has a picky diet. Creatinine from 8/14/18 was 1.29 mg/dL. Currently takes Prednisone 3 mg daily for his polymyalgia rheumatica.\par 10/8/19: Patient presents today for a follow up. Since his last visit it is reported that he developed Lymphoma. It has been treated and he is currently in remission. Regarding his urination, he continues to take Finasteride and Trimethoprim as directed. Nocturia x 5-6 is reported. During the day his urination is better but is still bothersome. If he does not go directly to the bathroom when he first gets the urge to urinate, urgency incontinence does occur. Tamsulosin was prescribed previously but it caused him to experience significant light headedness. The patient produced a urine sample which will be sent for urinalysis, urine cytology and urine culture. Blood work today included comprehensive metabolic panel, CBC, PSA and testosterone. Trimethoprim and Finasteride are to be continued as directed at this time. Silodosin 8 mg is prescribed at this time. i believe this will decrease the frequency of nocturia. He is to follow up in 2 weeks or sooner if clinically indicated. \par \par 06/12/2020: Pt contacted at [phone]. Gave permission to conduct a Telephone visit. On 10/08/2019, pt was on Finasteride and Trimethoprim to decrease obstruction and prevent UTI. Tried Tamsulosin but that caused lightheadedness. At his last visit, Silodosin was prescribed. 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 His vision was also impaired due to the virus and his eyes were red and swollen. He was bed bound for November and December. Lymphoma remains in remission. Valacyclovir for few months 4x daily, tapering off slowly. He has improved his vision, but it is still poor. Pt is back on insulin. He has recently started Alfuzosin qhs and it is helping him. Pt is currently doing well. \par Advised pt to make an appoint for mid to late August, when COVID-19 has waned somewhat. \par \par 09/01/2022: The patient and his wife presents today for a follow up telephone visit. I have not spoken to the patient since June 2020. His wife answered the phone and reports that the patient has been doing very well. He is interested in re-establishing his urologic care. We reviewed his urination and it appeared he was doing well.\par \par Patient will go for lab work at his nearest [facility] lab. \par \par Pt will have a telehealth visit after doing the lab work so we can review and discuss the results. He will then RTO for PE about a week later.  \par \par Duration of telephone visit: 8 minutes.

## 2022-09-04 NOTE — ADDENDUM
[FreeTextEntry1] : This note was authored by Janessa Blankenship working as a scribe for Dr.Gary Stark. I, Dr. Higinio Stark have reviewed the content of this note and confirm it is true and accurate. I personally performed the history and physical examination and made all the decisions 09/01/2022.

## 2022-09-05 LAB — BACTERIA UR CULT: NORMAL

## 2022-09-07 ENCOUNTER — APPOINTMENT (OUTPATIENT)
Dept: UROLOGY | Facility: CLINIC | Age: 87
End: 2022-09-07

## 2022-09-07 LAB — URINE CYTOLOGY: NORMAL

## 2022-09-07 PROCEDURE — 99443: CPT | Mod: 95

## 2022-09-07 RX ORDER — SILODOSIN 8 MG/1
8 CAPSULE ORAL DAILY
Qty: 30 | Refills: 11 | Status: COMPLETED | COMMUNITY
Start: 2019-10-08 | End: 2022-09-07

## 2022-09-07 RX ORDER — ALFUZOSIN HYDROCHLORIDE 10 MG/1
10 TABLET, EXTENDED RELEASE ORAL
Qty: 30 | Refills: 11 | Status: COMPLETED | COMMUNITY
Start: 2019-10-14 | End: 2022-09-07

## 2022-09-10 LAB
TESTOST FREE SERPL-MCNC: 0.9 PG/ML
TESTOST SERPL-MCNC: 103 NG/DL

## 2022-09-28 NOTE — PHYSICAL THERAPY INITIAL EVALUATION ADULT - STANDING BALANCE: STATIC
Ms. Brandi Cox is a 80yo female who presents to the ER with complaints of low back pain. She reports being diagnosed with COVID approximately 2 weeks ago. She had been fatigued and fell at home on Friday of last week. She had been seen at an outside ER at that time. She said that the next morning, she had severe low back pain. She had not had pain when she went to that previous ER visit. She has had worsening trouble getting up and ambulating on her own. She reports that she was unable to get up even assisted by her daughter this morning. Therefore, EMS was called to help get her out of bed. She was found to be hypertensive and was therefore brought to the ER. She complains of pain across her lower back. No abdominal pain. No changes with her urine or bowel movements. She said that she did not take any of her medicines yet this morning. No numbness, tingling, weakness. She denies any other complaints.        Past Medical History:   Diagnosis Date    Anemia     Sees Dr. Alissa Bhat and is on a prescription multivitamin call Multigen which has corrected her anemia    Headache     Hypertension     Intracranial hypotension 1998    had blood patch to correct    Meningitis 1950 & 2011    Osteochondritis 1975    Snoring        Past Surgical History:   Procedure Laterality Date    HX CATARACT REMOVAL  1997    HX DILATION AND CURETTAGE  1972    HX OTHER SURGICAL  1975    osteochroditis    [address]    blood patch for intercranial spontaneious hypotension          Family History:   Problem Relation Age of Onset    Lung Disease Brother         lung cancer    Diabetes Mother     Thyroid Disease Mother         goiter    Diabetes Sister     Heart Disease Sister     Heart Disease Father     Stroke Father     Hypertension Brother     Stroke Brother     Cancer Brother     Parkinson's Disease Sister     Other Sister         brain aneurysm    Alzheimer's Disease Sister        Social History     Socioeconomic History    Marital status:      Spouse name: Not on file    Number of children: Not on file    Years of education: Not on file    Highest education level: Not on file   Occupational History    Not on file   Tobacco Use    Smoking status: Former    Smokeless tobacco: Never   Substance and Sexual Activity    Alcohol use: Yes     Alcohol/week: 1.0 standard drink     Types: 1 Glasses of wine per week    Drug use: No    Sexual activity: Never   Other Topics Concern    Not on file   Social History Narrative    Not on file     Social Determinants of Health     Financial Resource Strain: Not on file   Food Insecurity: Not on file   Transportation Needs: Not on file   Physical Activity: Not on file   Stress: Not on file   Social Connections: Not on file   Intimate Partner Violence: Not on file   Housing Stability: Not on file         ALLERGIES: Hyzaar [losartan-hydrochlorothiazide], Pseudoephedrine, and Naprosyn [naproxen]    Review of Systems   Constitutional:  Positive for fatigue. Negative for chills and fever. HENT:  Negative for rhinorrhea and sore throat. Respiratory:  Negative for cough and shortness of breath. Cardiovascular:  Negative for chest pain. Gastrointestinal:  Negative for abdominal pain, diarrhea, nausea and vomiting. Genitourinary:  Negative for dysuria and urgency. Musculoskeletal:  Positive for back pain. Negative for arthralgias. Skin:  Negative for rash. Neurological:  Negative for dizziness, weakness and light-headedness. Vitals:    09/28/22 0855 09/28/22 0901 09/28/22 0901   BP: (!) 239/109 (!) 221/68    Pulse: (!) 101 84 84   Resp: 20     Temp: 98 °F (36.7 °C)     SpO2: 95% 96% 95%   Weight: 68.9 kg (152 lb)     Height: 5' 6\" (1.676 m)              Physical Exam     Vital signs reviewed. Nursing notes reviewed.     Const:  No acute distress, well developed, well nourished  Head:  Atraumatic, normocephalic  Eyes:  PERRL, conjunctiva normal, no scleral icterus  Neck: Supple, trachea midline  Cardiovascular: Regular rate   resp:  No resp distress, no increased work of breathing  Abd:  Soft, non-tender, non-distended  MSK:  No pedal edema, normal ROM, very minimal diffuse L-spine tenderness, no T-spine tenderness  Neuro:  Alert and oriented x3, no cranial nerve defect  Skin:  Warm, dry, intact  Psych: normal mood and affect, behavior is normal, judgement and thought content is normal          MDM     Amount and/or Complexity of Data Reviewed  Clinical lab tests: ordered and reviewed  Tests in the radiology section of CPT®: ordered and reviewed  Review and summarize past medical records: yes    Patient Progress  Patient progress: stable         Procedures        9:34 AM  I reviewed her most recent PCP visit. Her BP then was in the 984 systolic. She reports that her BP has been \"high\" before but is unsure what the numbers have been in the past.  She is on 1 BP med at home. Perfect Serve Consult for Admission  1:03 PM    ED Room Number: TM78/75  Patient Name and age:  Barbara Francois 81 y.o.  female  Working Diagnosis:   1. Hypertensive urgency    2. Closed compression fracture of body of L1 vertebra (HCC)        COVID-19 Suspicion:  no  Sepsis present:  no  Reassessment needed: no  Readmission: no  Isolation Requirements:  no  Recommended Level of Care:  telemetry  Department:[facility] ED - [phone]  Other:  MRI of L spine ordered to further characterize. IV labetolol given for HTN. Ms. Colby Birmingham is a 82yo female who presents to the ER with lower back pain. Pt. Is unabel to walk due to the pain. Pt. Found to have a L1 compression fracture. MRI ordered to eval for possible pathologic fx. Pt. Presented with an extremely high BP. AT her last visit, her BP was in the 686D systolic. Her BP did improve today after IV labetolol. Pt.  To be evaluated for admission by the family medicine team. with R/W/fair balance

## 2022-10-16 NOTE — ASSESSMENT
[FreeTextEntry1] : The patient and I are both pleased with his current neurologic condition.  We will continue his current medications which include trimethoprim 100 mg each evening.  Finasteride 5 mg daily.  And alfuzosin 10 mg daily with a meal.  I asked the patient to return in 6 months time.\par \par The duration of the telephone visit was 25 minutes.\par

## 2022-10-16 NOTE — HISTORY OF PRESENT ILLNESS
[FreeTextEntry1] : The patient has a past history of lymphoma.  He has been disease-free.  He has a history of benign prostatic hypertrophy with bladder outlet obstruction.  He is maintained on finasteride 5 mg once daily.  He takes alfuzosin 10 mg once daily with food.  He takes trimethoprim 100 mg at bedtime to prevent urinary tract infections.  He has had no infections since her last visit.  His appetite is good does not wake excessively at night to urinate.  He is not troubled by constipation.  He has no chest pains.  He is continuing to do very well.\par \par In anticipation of today's visit he had laboratory work done on September 2.  We reviewed the results.His urinalysis was normal. His testosterone was low at 103.0 and his free testosterone was low at 0.9.  Nonetheless his vitality is good and he is not interested in supplementing his testosterone levels.  We also reviewed his CBC.  He has a stable anemia with a red blood cell count of 3.59/mcL.  His hemoglobin is 10.9 and his hematocrit is 32.7.  He is currently 87 years old.  He has diabetes.  His hemoglobin A1c is 6.6% which is well within the desired range.  His creatinine is 1.35 mg/dL which is within his usual range.  Patient drinks minimal amounts of liquids during the day.  His alkaline phosphatase was normal at 66.  His PSA is 0.10 which is excellent.  It is this low in part because of his low testosterone.  Furthermore his anemia is probably in part due to his low testosterone and slightly elevated creatinine.  Urine culture showed less than 10,000 colony-forming units per milliliter of normal urogenital marquise.  His urine cytology was negative for high-grade urothelial carcinoma.  It showed some reactive and degenerated urothelial cells.

## 2023-05-05 NOTE — PATIENT PROFILE ADULT - ARRIVAL FROM
----- Message from Lillie Gomes sent at 5/4/2023  4:28 PM CDT -----  Type: Patient Call Back    Who called:sekou francois    What is the request in detail:clarify directions -ALPRAZolam (XANAX) 1 MG tablet    Can the clinic reply by [facility]?    Would the patient rather a call back or a response via [facility]?    Best call back number:    Additional Information:   [facility] - [place], [place] - [address]  [address] [address]  Phone: [phone] Fax: [phone]           Home

## 2023-07-05 ENCOUNTER — APPOINTMENT (OUTPATIENT)
Dept: UROLOGY | Facility: CLINIC | Age: 88
End: 2023-07-05
Payer: MEDICARE

## 2023-07-05 VITALS
TEMPERATURE: 98.2 F | RESPIRATION RATE: 16 BRPM | OXYGEN SATURATION: 96 % | SYSTOLIC BLOOD PRESSURE: 136 MMHG | HEART RATE: 60 BPM | DIASTOLIC BLOOD PRESSURE: 75 MMHG

## 2023-07-05 PROCEDURE — 99214 OFFICE O/P EST MOD 30 MIN: CPT

## 2023-07-05 RX ORDER — TADALAFIL 5 MG/1
5 TABLET ORAL
Qty: 30 | Refills: 11 | Status: ACTIVE | COMMUNITY
Start: 2023-07-05 | End: 1900-01-01

## 2023-07-09 LAB
ALP BLD-CCNC: 58 U/L
ANION GAP SERPL CALC-SCNC: 13 MMOL/L
APPEARANCE: CLEAR
BACTERIA UR CULT: NORMAL
BACTERIA: NEGATIVE /HPF
BILIRUBIN URINE: NEGATIVE
BLOOD URINE: NEGATIVE
BUN SERPL-MCNC: 22 MG/DL
CALCIUM SERPL-MCNC: 9 MG/DL
CAST: 0 /LPF
CHLORIDE SERPL-SCNC: 102 MMOL/L
CO2 SERPL-SCNC: 24 MMOL/L
COLOR: YELLOW
CREAT SERPL-MCNC: 1.22 MG/DL
EGFR: 57 ML/MIN/1.73M2
EPITHELIAL CELLS: 0 /HPF
ESTIMATED AVERAGE GLUCOSE: 157 MG/DL
GLUCOSE QUALITATIVE U: NEGATIVE MG/DL
GLUCOSE SERPL-MCNC: 131 MG/DL
HBA1C MFR BLD HPLC: 7.1 %
KETONES URINE: NEGATIVE MG/DL
LEUKOCYTE ESTERASE URINE: NEGATIVE
MICROSCOPIC-UA: NORMAL
NITRITE URINE: NEGATIVE
PH URINE: 6.5
POTASSIUM SERPL-SCNC: 4.2 MMOL/L
PROTEIN URINE: NEGATIVE MG/DL
PSA FREE FLD-MCNC: 35 %
PSA FREE SERPL-MCNC: 0.02 NG/ML
PSA SERPL-MCNC: 0.06 NG/ML
RED BLOOD CELLS URINE: 1 /HPF
SODIUM SERPL-SCNC: 140 MMOL/L
SPECIFIC GRAVITY URINE: 1.01
TESTOST FREE SERPL-MCNC: 0.8 PG/ML
TESTOST SERPL-MCNC: 77.9 NG/DL
URINE CYTOLOGY: NORMAL
UROBILINOGEN URINE: 0.2 MG/DL
WHITE BLOOD CELLS URINE: 0 /HPF

## 2023-07-09 NOTE — ADDENDUM
[FreeTextEntry1] : This note was authored by Janessa Blankenship working as a scribe for Dr.Gary Stark. I, Dr. Higinio Stark have reviewed the content of this note and confirm it is true and accurate. I personally performed the history and physical examination and made all the decisions 07/05/2023

## 2023-07-09 NOTE — REVIEW OF SYSTEMS
[Constipation] : constipation [Nocturia] : nocturia [Joint Stiffness] : joint stiffness [Easy Bleeding] : a tendency for easy bleeding [Easy Bruising] : a tendency for easy bruising [Urinary Stream Starts/Stops] : urinary stream starts and stops [Difficulty Walking] : difficulty walking [Feeling Poorly] : not feeling poorly [Chest Pain] : no chest pain

## 2023-07-09 NOTE — HISTORY OF PRESENT ILLNESS
[FreeTextEntry1] : 9/7/2022: The patient has a past history of lymphoma.  He has been disease-free.  He has a history of benign prostatic hypertrophy with bladder outlet obstruction.  He is maintained on finasteride 5 mg once daily.  He takes alfuzosin 10 mg once daily with food.  He takes trimethoprim 100 mg at bedtime to prevent urinary tract infections.  He has had no infections since her last visit.  His appetite is good does not wake excessively at night to urinate.  He is not troubled by constipation.  He has no chest pains.  He is continuing to do very well.\par \par In anticipation of today's visit he had laboratory work done on September 2.  We reviewed the results.His urinalysis was normal. His testosterone was low at 103.0 and his free testosterone was low at 0.9.  Nonetheless his vitality is good and he is not interested in supplementing his testosterone levels.  We also reviewed his CBC.  He has a stable anemia with a red blood cell count of 3.59/mcL.  His hemoglobin is 10.9 and his hematocrit is 32.7.  He is currently 87 years old.  He has diabetes.  His hemoglobin A1c is 6.6% which is well within the desired range.  His creatinine is 1.35 mg/dL which is within his usual range.  Patient drinks minimal amounts of liquids during the day.  His alkaline phosphatase was normal at 66.  His PSA is 0.10 which is excellent.  It is this low in part because of his low testosterone.  Furthermore his anemia is probably in part due to his low testosterone and slightly elevated creatinine.  Urine culture showed less than 10,000 colony-forming units per milliliter of normal urogenital marquise.  His urine cytology was negative for high-grade urothelial carcinoma.  It showed some reactive and degenerated urothelial cells.\par \par 07/05/2023: Mr. JUDI JOHNSON presents today for a follow up. He is accompanied by his wife who is also a patient of mine and being seen today. Is currently disease free from Lymphoma. His wife reports that he was hospitalized for shingles with complications after returning from [facility]. He spent 2 months in a wheel chair after the hospital but is now walking w/o problems. Was diagnosed with diabetes but sugar is under control, sees . Continues to take finasteride 5 mg once daily and trimethoprim 100 mg daily. Has not had any recent UTIs. C/o nocturia x3-4 and intermittent stream.

## 2023-07-09 NOTE — ASSESSMENT
[FreeTextEntry1] : 9/7/2022: The patient has a past history of lymphoma.  He has been disease-free.  He has a history of benign prostatic hypertrophy with bladder outlet obstruction.  He is maintained on finasteride 5 mg once daily.  He takes alfuzosin 10 mg once daily with food.  He takes trimethoprim 100 mg at bedtime to prevent urinary tract infections.  He has had no infections since her last visit.  His appetite is good does not wake excessively at night to urinate.  He is not troubled by constipation.  He has no chest pains.  He is continuing to do very well.\par \par In anticipation of today's visit he had laboratory work done on September 2.  We reviewed the results.His urinalysis was normal. His testosterone was low at 103.0 and his free testosterone was low at 0.9.  Nonetheless his vitality is good and he is not interested in supplementing his testosterone levels.  We also reviewed his CBC.  He has a stable anemia with a red blood cell count of 3.59/mcL.  His hemoglobin is 10.9 and his hematocrit is 32.7.  He is currently 87 years old.  He has diabetes.  His hemoglobin A1c is 6.6% which is well within the desired range.  His creatinine is 1.35 mg/dL which is within his usual range.  Patient drinks minimal amounts of liquids during the day.  His alkaline phosphatase was normal at 66.  His PSA is 0.10 which is excellent.  It is this low in part because of his low testosterone.  Furthermore his anemia is probably in part due to his low testosterone and slightly elevated creatinine.  Urine culture showed less than 10,000 colony-forming units per milliliter of normal urogenital marquise.  His urine cytology was negative for high-grade urothelial carcinoma.  It showed some reactive and degenerated urothelial cells.\par \par The patient and I are both pleased with his current neurologic condition.  We will continue his current medications which include trimethoprim 100 mg each evening.  Finasteride 5 mg daily.  And alfuzosin 10 mg daily with a meal.  I asked the patient to return in 6 months time.\par \par 07/05/2023: Mr. JUDI JOHNSON presents today for a follow up. He is accompanied by his wife who is also a patient of mine and being seen today. Is currently disease free from Lymphoma. His wife reports that he was hospitalized for shingles with complications after returning from [facility]. He spent 2 months in a wheel chair after the hospital but is now walking w/o problems. Was diagnosed with diabetes but sugar is under control, sees . Continues to take finasteride 5 mg once daily and trimethoprim 100 mg daily. Has not had any recent UTIs. C/o nocturia x3-4 and intermittent stream. \par \par I requested the patient have his oncologist send me his most recent note so I am updated on his disease status. \par \par Pt will continue taking finasteride 5 mg once daily and trimethoprim 100 mg daily for prophylaxis, prescriptions were renewed. Additionally, I am prescribing the patient tadalafil 5 mg once daily. I informed him he should expect to see improvement in about one week.I informed him of the possible side effects of heart burn, tingling feeling on the skin, back ache, and on rare occasion visual and auditory problems. \par \par The patient produced a urine sample which will be sent for urinalysis, urine cytology, and urine culture. \par \par Blood work today included A1C, Alk phos, BMP, CBC with diff, PSA profile and testosterone, free and total. \par \par Patient will RTO in one month for reassessment on tadalafil, sooner if clinically indicated. \par \par Preparation, in person office visit, and coordination of care: 30 minutes.

## 2023-07-11 ENCOUNTER — APPOINTMENT (OUTPATIENT)
Dept: UROLOGY | Facility: CLINIC | Age: 88
End: 2023-07-11
Payer: MEDICARE

## 2023-07-11 ENCOUNTER — NON-APPOINTMENT (OUTPATIENT)
Age: 88
End: 2023-07-11

## 2023-07-11 DIAGNOSIS — D64.9 ANEMIA, UNSPECIFIED: ICD-10-CM

## 2023-07-11 DIAGNOSIS — N39.0 URINARY TRACT INFECTION, SITE NOT SPECIFIED: ICD-10-CM

## 2023-07-11 DIAGNOSIS — M35.3 POLYMYALGIA RHEUMATICA: ICD-10-CM

## 2023-07-11 DIAGNOSIS — D69.6 THROMBOCYTOPENIA, UNSPECIFIED: ICD-10-CM

## 2023-07-11 DIAGNOSIS — R39.15 URGENCY OF URINATION: ICD-10-CM

## 2023-07-11 PROCEDURE — 99442: CPT | Mod: 95

## 2023-07-11 NOTE — ADDENDUM
[FreeTextEntry1] : This note was authored by Lali Saucedo working as a scribe for Dr.Gary Stark. I, Dr. Higinio Stark have reviewed the content of this note and confirm it is true and accurate. I personally performed the history and physical examination and made all the decisions 07/11/2023\par

## 2023-07-11 NOTE — REVIEW OF SYSTEMS
[Constipation] : constipation [Nocturia] : nocturia [Urinary Stream Starts/Stops] : urinary stream starts and stops [Joint Stiffness] : joint stiffness [Difficulty Walking] : difficulty walking [Easy Bleeding] : a tendency for easy bleeding [Easy Bruising] : a tendency for easy bruising [Feeling Poorly] : not feeling poorly [Chest Pain] : no chest pain

## 2023-07-11 NOTE — ASSESSMENT
[FreeTextEntry1] : 9/7/2022: The patient has a past history of lymphoma.  He has been disease-free.  He has a history of benign prostatic hypertrophy with bladder outlet obstruction.  He is maintained on finasteride 5 mg once daily.  He takes alfuzosin 10 mg once daily with food.  He takes trimethoprim 100 mg at bedtime to prevent urinary tract infections.  He has had no infections since her last visit.  His appetite is good does not wake excessively at night to urinate.  He is not troubled by constipation.  He has no chest pains.  He is continuing to do very well.\par \par In anticipation of today's visit he had laboratory work done on September 2.  We reviewed the results.His urinalysis was normal. His testosterone was low at 103.0 and his free testosterone was low at 0.9.  Nonetheless his vitality is good and he is not interested in supplementing his testosterone levels.  We also reviewed his CBC.  He has a stable anemia with a red blood cell count of 3.59/mcL.  His hemoglobin is 10.9 and his hematocrit is 32.7.  He is currently 87 years old.  He has diabetes.  His hemoglobin A1c is 6.6% which is well within the desired range.  His creatinine is 1.35 mg/dL which is within his usual range.  Patient drinks minimal amounts of liquids during the day.  His alkaline phosphatase was normal at 66.  His PSA is 0.10 which is excellent.  It is this low in part because of his low testosterone.  Furthermore his anemia is probably in part due to his low testosterone and slightly elevated creatinine.  Urine culture showed less than 10,000 colony-forming units per milliliter of normal urogenital marquise.  His urine cytology was negative for high-grade urothelial carcinoma.  It showed some reactive and degenerated urothelial cells.\par \par The patient and I are both pleased with his current neurologic condition.  We will continue his current medications which include trimethoprim 100 mg each evening.  Finasteride 5 mg daily.  And alfuzosin 10 mg daily with a meal.  I asked the patient to return in 6 months time.\par \par 07/05/2023: Mr. JUDI JOHNSON presents today for a follow up. He is accompanied by his wife who is also a patient of mine and being seen today. Is currently disease free from Lymphoma. His wife reports that he was hospitalized for shingles with complications after returning from [facility]. He spent 2 months in a wheel chair after the hospital but is now walking w/o problems. Was diagnosed with diabetes but sugar is under control, sees . Continues to take finasteride 5 mg once daily and trimethoprim 100 mg daily. Has not had any recent UTIs. C/o nocturia x3-4 and intermittent stream. \par \par I requested the patient have his oncologist send me his most recent note so I am updated on his disease status. \par \par Pt will continue taking finasteride 5 mg once daily and trimethoprim 100 mg daily for prophylaxis, prescriptions were renewed. Additionally, I am prescribing the patient tadalafil 5 mg once daily. I informed him he should expect to see improvement in about one week.I informed him of the possible side effects of heart burn, tingling feeling on the skin, back ache, and on rare occasion visual and auditory problems. \par \par The patient produced a urine sample which will be sent for urinalysis, urine cytology, and urine culture. \par Blood work today included A1C, Alk phos, BMP, CBC with diff, PSA profile and testosterone, free and total. \par Patient will RTO in one month for reassessment on tadalafil, sooner if clinically indicated. \par \par 07/11/2023: Mr. JOHNSON presents today for a follow up audio only telehealth visit for which they gave permission for. The patient was located at home [address] [place] [address] and I was located in my office in [place], [place]. He is accompanied by his wife. His 7/5/23 lab work revealed PSA is excellent at 0.06. Patient has mild anemia which may be due to low testosterone. His CBC results are low as the RBC Count 3.49 M/u L HGB 11.5 g/dL HCT 33.1 %. Current creatinine is normal. Estimated GFR is only slightly below desired amount for age at 57. Alk Phos is normal at 58. Hemoglobin A1c is slightly elevated above the desired range at 7.1% and eAG at 157. Testosterone level is low in just modestly above castrate levels at 0.8 and total at 77.9.\par His wife reports patient is feeling well and offers no new complaints.\par \par Reviewed and discussed laboratory work of 7/5/23 which He  obtained prior to today's visit as requested.\par \par The patient will return to office in August. \par \par Duration of call: 10 Minutes

## 2023-07-11 NOTE — HISTORY OF PRESENT ILLNESS
[FreeTextEntry1] : 9/7/2022: The patient has a past history of lymphoma.  He has been disease-free.  He has a history of benign prostatic hypertrophy with bladder outlet obstruction.  He is maintained on finasteride 5 mg once daily.  He takes alfuzosin 10 mg once daily with food.  He takes trimethoprim 100 mg at bedtime to prevent urinary tract infections.  He has had no infections since her last visit.  His appetite is good does not wake excessively at night to urinate.  He is not troubled by constipation.  He has no chest pains.  He is continuing to do very well.\par \par In anticipation of today's visit he had laboratory work done on September 2.  We reviewed the results.His urinalysis was normal. His testosterone was low at 103.0 and his free testosterone was low at 0.9.  Nonetheless his vitality is good and he is not interested in supplementing his testosterone levels.  We also reviewed his CBC.  He has a stable anemia with a red blood cell count of 3.59/mcL.  His hemoglobin is 10.9 and his hematocrit is 32.7.  He is currently 87 years old.  He has diabetes.  His hemoglobin A1c is 6.6% which is well within the desired range.  His creatinine is 1.35 mg/dL which is within his usual range.  Patient drinks minimal amounts of liquids during the day.  His alkaline phosphatase was normal at 66.  His PSA is 0.10 which is excellent.  It is this low in part because of his low testosterone.  Furthermore his anemia is probably in part due to his low testosterone and slightly elevated creatinine.  Urine culture showed less than 10,000 colony-forming units per milliliter of normal urogenital marquise.  His urine cytology was negative for high-grade urothelial carcinoma.  It showed some reactive and degenerated urothelial cells.\par \par 07/05/2023: Mr. JUDI JOHNSON presents today for a follow up. He is accompanied by his wife who is also a patient of mine and being seen today. Is currently disease free from Lymphoma. His wife reports that he was hospitalized for shingles with complications after returning from [facility]. He spent 2 months in a wheel chair after the hospital but is now walking w/o problems. Was diagnosed with diabetes but sugar is under control, sees . Continues to take finasteride 5 mg once daily and trimethoprim 100 mg daily. Has not had any recent UTIs. C/o nocturia x3-4 and intermittent stream. \par \par 07/11/2023: Mr. JOHNSON presents today for a follow up audio only telehealth visit for which they gave permission for. The patient was located at home [address] [place] [address] and I was located in my office in [place], [place]. He is accompanied by his wife. His 7/5/23 lab work revealed PSA is excellent at 0.06. Patient has mild anemia which may be due to low testosterone. His CBC results are low as the RBC Count 3.49 M/u L HGB 11.5 g/dL HCT 33.1 %. Current creatinine is normal. Estimated GFR is only slightly below desired amount for age at 57. Alk Phos is normal at 58. Hemoglobin A1c is slightly elevated above the desired range at 7.1% and eAG at 157. Testosterone level is low in just modestly above castrate levels at 0.8 and total at 77.9.\par His wife reports patient is feeling well and offers no new complaints.

## 2023-07-15 PROBLEM — N39.0 RECURRENT UTI (URINARY TRACT INFECTION): Status: ACTIVE | Noted: 2018-12-24

## 2023-07-15 PROBLEM — R39.15 URINARY URGENCY: Status: ACTIVE | Noted: 2019-10-08

## 2023-07-15 NOTE — ASSESSMENT
[FreeTextEntry1] : 9/7/2022: The patient has a past history of lymphoma.  He has been disease-free.  He has a history of benign prostatic hypertrophy with bladder outlet obstruction.  He is maintained on finasteride 5 mg once daily.  He takes alfuzosin 10 mg once daily with food.  He takes trimethoprim 100 mg at bedtime to prevent urinary tract infections.  He has had no infections since her last visit.  His appetite is good does not wake excessively at night to urinate.  He is not troubled by constipation.  He has no chest pains.  He is continuing to do very well.\par \par In anticipation of today's visit he had laboratory work done on September 2.  We reviewed the results.His urinalysis was normal. His testosterone was low at 103.0 and his free testosterone was low at 0.9.  Nonetheless his vitality is good and he is not interested in supplementing his testosterone levels.  We also reviewed his CBC.  He has a stable anemia with a red blood cell count of 3.59/mcL.  His hemoglobin is 10.9 and his hematocrit is 32.7.  He is currently 87 years old.  He has diabetes.  His hemoglobin A1c is 6.6% which is well within the desired range.  His creatinine is 1.35 mg/dL which is within his usual range.  Patient drinks minimal amounts of liquids during the day.  His alkaline phosphatase was normal at 66.  His PSA is 0.10 which is excellent.  It is this low in part because of his low testosterone.  Furthermore his anemia is probably in part due to his low testosterone and slightly elevated creatinine.  Urine culture showed less than 10,000 colony-forming units per milliliter of normal urogenital marquise.  His urine cytology was negative for high-grade urothelial carcinoma.  It showed some reactive and degenerated urothelial cells.\par \par The patient and I are both pleased with his current neurologic condition.  We will continue his current medications which include trimethoprim 100 mg each evening.  Finasteride 5 mg daily.  And alfuzosin 10 mg daily with a meal.  I asked the patient to return in 6 months time.\par \par 07/05/2023: Mr. JUDI JOHNSON presents today for a follow up. He is accompanied by his wife who is also a patient of mine and being seen today. Is currently disease free from Lymphoma. His wife reports that he was hospitalized for shingles with complications after returning from [facility]. He spent 2 months in a wheel chair after the hospital but is now walking w/o problems. Was diagnosed with diabetes but sugar is under control, sees . Continues to take finasteride 5 mg once daily and trimethoprim 100 mg daily. Has not had any recent UTIs. C/o nocturia x3-4 and intermittent stream. \par \par I requested the patient have his oncologist send me his most recent note so I am updated on his disease status. \par \par Pt will continue taking finasteride 5 mg once daily and trimethoprim 100 mg daily for prophylaxis, prescriptions were renewed. Additionally, I am prescribing the patient tadalafil 5 mg once daily. I informed him he should expect to see improvement in about one week.I informed him of the possible side effects of heart burn, tingling feeling on the skin, back ache, and on rare occasion visual and auditory problems. \par \par The patient produced a urine sample which will be sent for urinalysis, urine cytology, and urine culture. \par Blood work today included A1C, Alk phos, BMP, CBC with diff, PSA profile and testosterone, free and total. \par Patient will RTO in one month for reassessment on tadalafil, sooner if clinically indicated. \par \par 07/11/2023: Mr. JOHNSON presents today for a follow up audio only telehealth visit for which they gave permission for. The patient was located at home [address] [place] [address] and I was located in my office in [place], [place]. He is accompanied by his wife. His 7/5/23 lab work revealed PSA is excellent at 0.06. Patient has mild anemia which may be due to low testosterone. His CBC results are low as the RBC Count 3.49 M/u L HGB 11.5 g/dL HCT 33.1 %. Current creatinine is normal. Estimated GFR is only slightly below desired amount for age at 57. Alk Phos is normal at 58. Hemoglobin A1c is slightly elevated above the desired range at 7.1% and eAG at 157. Testosterone level is low in just modestly above castrate levels at 0.8 and total at 77.9.\par His wife reports patient is feeling well and offers no new complaints.\par \par Reviewed and discussed laboratory work of 7/5/23 which He  obtained prior to today's visit as requested.\par \par The patient will return to office in August. \par \par Duration of call: 12 Minutes

## 2023-07-15 NOTE — HISTORY OF PRESENT ILLNESS
[FreeTextEntry1] : 9/7/2022: The patient has a past history of lymphoma.  He has been disease-free.  He has a history of benign prostatic hypertrophy with bladder outlet obstruction.  He is maintained on finasteride 5 mg once daily.  He takes alfuzosin 10 mg once daily with food.  He takes trimethoprim 100 mg at bedtime to prevent urinary tract infections.  He has had no infections since her last visit.  His appetite is good does not wake excessively at night to urinate.  He is not troubled by constipation.  He has no chest pains.  He is continuing to do very well.\par \par In anticipation of today's visit he had laboratory work done on September 2.  We reviewed the results.His urinalysis was normal. His testosterone was low at 103.0 and his free testosterone was low at 0.9.  Nonetheless his vitality is good and he is not interested in supplementing his testosterone levels.  We also reviewed his CBC.  He has a stable anemia with a red blood cell count of 3.59/mcL.  His hemoglobin is 10.9 and his hematocrit is 32.7.  He is currently 87 years old.  He has diabetes.  His hemoglobin A1c is 6.6% which is well within the desired range.  His creatinine is 1.35 mg/dL which is within his usual range.  Patient drinks minimal amounts of liquids during the day.  His alkaline phosphatase was normal at 66.  His PSA is 0.10 which is excellent.  It is this low in part because of his low testosterone.  Furthermore his anemia is probably in part due to his low testosterone and slightly elevated creatinine.  Urine culture showed less than 10,000 colony-forming units per milliliter of normal urogenital marquise.  His urine cytology was negative for high-grade urothelial carcinoma.  It showed some reactive and degenerated urothelial cells.\par \par 07/05/2023: Mr. JUDI JOHNSON presents today for a follow up. He is accompanied by his wife who is also a patient of mine and being seen today. Is currently disease free from Lymphoma. His wife reports that he was hospitalized for shingles with complications after returning from [facility]. He spent 2 months in a wheel chair after the hospital but is now walking w/o problems. Was diagnosed with diabetes but sugar is under control, sees . Continues to take finasteride 5 mg once daily and trimethoprim 100 mg daily. Has not had any recent UTIs. C/o nocturia x3-4 and intermittent stream. \par \par 07/11/2023: Mr. JOHNSON presents today for a follow up audio only telehealth visit for which they gave permission for. The patient was located at home [address] [place] [address] and I was located in my office in [place], [place]. He is accompanied by his wife. His 7/5/23 lab work revealed PSA is excellent at 0.06. Patient has mild anemia which may be due to low testosterone. His CBC results are low as the RBC Count 3.49 M/u L HGB 11.5 g/dL HCT 33.1 %. Current creatinine is normal. Estimated GFR is only slightly below desired amount for age at 58. Alk Phos is normal at 58. Hemoglobin A1c is slightly elevated above the desired range at 7.1% and eAG at 157. Testosterone level is low in just modestly above castrate levels at 0.8 and total at 77.9.\par His wife reports patient is feeling well and offers no new complaints.

## 2023-07-15 NOTE — REVIEW OF SYSTEMS
[Feeling Poorly] : not feeling poorly [Chest Pain] : no chest pain [Constipation] : constipation [Nocturia] : nocturia [Urinary Stream Starts/Stops] : urinary stream starts and stops [Joint Stiffness] : joint stiffness [Difficulty Walking] : difficulty walking [Easy Bleeding] : a tendency for easy bleeding [Easy Bruising] : a tendency for easy bruising

## 2023-08-07 ENCOUNTER — APPOINTMENT (OUTPATIENT)
Dept: UROLOGY | Facility: CLINIC | Age: 88
End: 2023-08-07
Payer: MEDICARE

## 2023-08-07 VITALS
WEIGHT: 175.2 LBS | TEMPERATURE: 98.2 F | DIASTOLIC BLOOD PRESSURE: 66 MMHG | SYSTOLIC BLOOD PRESSURE: 152 MMHG | HEART RATE: 62 BPM | BODY MASS INDEX: 23.73 KG/M2 | RESPIRATION RATE: 16 BRPM | HEIGHT: 72 IN | OXYGEN SATURATION: 94 %

## 2023-08-07 DIAGNOSIS — N18.2 CHRONIC KIDNEY DISEASE, STAGE 2 (MILD): ICD-10-CM

## 2023-08-07 DIAGNOSIS — N13.8 BENIGN PROSTATIC HYPERPLASIA WITH LOWER URINARY TRACT SYMPMS: ICD-10-CM

## 2023-08-07 DIAGNOSIS — E11.9 TYPE 2 DIABETES MELLITUS W/OUT COMPLICATIONS: ICD-10-CM

## 2023-08-07 DIAGNOSIS — R79.89 OTHER SPECIFIED ABNORMAL FINDINGS OF BLOOD CHEMISTRY: ICD-10-CM

## 2023-08-07 DIAGNOSIS — R31.29 OTHER MICROSCOPIC HEMATURIA: ICD-10-CM

## 2023-08-07 DIAGNOSIS — C85.90 NON-HODGKIN LYMPHOMA, UNSPECIFIED, UNSPECIFIED SITE: ICD-10-CM

## 2023-08-07 DIAGNOSIS — R35.1 NOCTURIA: ICD-10-CM

## 2023-08-07 DIAGNOSIS — N40.1 BENIGN PROSTATIC HYPERPLASIA WITH LOWER URINARY TRACT SYMPMS: ICD-10-CM

## 2023-08-07 DIAGNOSIS — R35.0 FREQUENCY OF MICTURITION: ICD-10-CM

## 2023-08-07 DIAGNOSIS — N28.9 DISORDER OF KIDNEY AND URETER, UNSPECIFIED: ICD-10-CM

## 2023-08-07 PROCEDURE — 99215 OFFICE O/P EST HI 40 MIN: CPT

## 2023-08-07 RX ORDER — DESMOPRESSIN ACETATE 0.1 MG/1
0.1 TABLET ORAL
Qty: 30 | Refills: 11 | Status: ACTIVE | COMMUNITY
Start: 1900-01-01 | End: 1900-01-01

## 2023-08-13 PROBLEM — R79.89 DECREASED TESTOSTERONE LEVEL: Status: ACTIVE | Noted: 2022-10-16

## 2023-08-13 PROBLEM — C85.90 LYMPHOMA: Status: ACTIVE | Noted: 2019-10-08

## 2023-08-13 NOTE — ASSESSMENT
[FreeTextEntry1] : 9/7/2022: The patient has a past history of lymphoma.  He has been disease-free.  He has a history of benign prostatic hypertrophy with bladder outlet obstruction.  He is maintained on finasteride 5 mg once daily.  He takes alfuzosin 10 mg once daily with food.  He takes trimethoprim 100 mg at bedtime to prevent urinary tract infections.  He has had no infections since her last visit.  His appetite is good does not wake excessively at night to urinate.  He is not troubled by constipation.  He has no chest pains.  He is continuing to do very well.  In anticipation of today's visit he had laboratory work done on September 2.  We reviewed the results.His urinalysis was normal. His testosterone was low at 103.0 and his free testosterone was low at 0.9.  Nonetheless his vitality is good and he is not interested in supplementing his testosterone levels.  We also reviewed his CBC.  He has a stable anemia with a red blood cell count of 3.59/mcL.  His hemoglobin is 10.9 and his hematocrit is 32.7.  He is currently 87 years old.  He has diabetes.  His hemoglobin A1c is 6.6% which is well within the desired range.  His creatinine is 1.35 mg/dL which is within his usual range.  Patient drinks minimal amounts of liquids during the day.  His alkaline phosphatase was normal at 66.  His PSA is 0.10 which is excellent.  It is this low in part because of his low testosterone.  Furthermore his anemia is probably in part due to his low testosterone and slightly elevated creatinine.  Urine culture showed less than 10,000 colony-forming units per milliliter of normal urogenital marquise.  His urine cytology was negative for high-grade urothelial carcinoma.  It showed some reactive and degenerated urothelial cells.  The patient and I are both pleased with his current neurologic condition.  We will continue his current medications which include trimethoprim 100 mg each evening.  Finasteride 5 mg daily.  And alfuzosin 10 mg daily with a meal.  I asked the patient to return in 6 months time.  07/05/2023: Mr. JUDI JOHNSON presents today for a follow up. He is accompanied by his wife who is also a patient of mine and being seen today. Is currently disease free from Lymphoma. His wife reports that he was hospitalized for shingles with complications after returning from [facility]. He spent 2 months in a wheel chair after the hospital but is now walking w/o problems. Was diagnosed with diabetes but sugar is under control, sees . Continues to take finasteride 5 mg once daily and trimethoprim 100 mg daily. Has not had any recent UTIs. C/o nocturia x3-4 and intermittent stream.   I requested the patient have his oncologist send me his most recent note so I am updated on his disease status.   Pt will continue taking finasteride 5 mg once daily and trimethoprim 100 mg daily for prophylaxis, prescriptions were renewed. Additionally, I am prescribing the patient tadalafil 5 mg once daily. I informed him he should expect to see improvement in about one week.I informed him of the possible side effects of heart burn, tingling feeling on the skin, back ache, and on rare occasion visual and auditory problems.   The patient produced a urine sample which will be sent for urinalysis, urine cytology, and urine culture.  Blood work today included A1C, Alk phos, BMP, CBC with diff, PSA profile and testosterone, free and total.  Patient will RTO in one month for reassessment on tadalafil, sooner if clinically indicated.   07/11/2023: Mr. JOHNSON presents today for a follow up audio only telehealth visit for which they gave permission for. The patient was located at home [address] [place] [address] and I was located in my office in [place], [place]. He is accompanied by his wife. His 7/5/23 lab work revealed PSA is excellent at 0.06. Patient has mild anemia which may be due to low testosterone. His CBC results are low as the RBC Count 3.49 M/u L HGB 11.5 g/dL HCT 33.1 %. Current creatinine is normal. Estimated GFR is only slightly below desired amount for age at 58. Alk Phos is normal at 58. Hemoglobin A1c is slightly elevated above the desired range at 7.1% and eAG at 157. Testosterone level is low in just modestly above castrate levels at 0.8 and total at 77.9. His wife reports patient is feeling well and offers no new complaints.  Reviewed and discussed laboratory work of 7/5/23 which He  obtained prior to today's visit as requested. The patient will return to office in August.   08/07/2023: Mr. JUDI JOHNSON presents today for a follow up. He is accompanied by his wife. He reports his urination is fairly good during the daytime. Voids about 4-5x during the waking period. Nocturia x3-4. Has not found tadalafil 5 mg once daily to be helpful. He does c/o lump on the left side of his face consistent with swelling of the salivary gland. Pt is on digoxin. Has recently been put on a water pill. Wife has noticed the pt has been more forgetful and confused this year.   The patient produced a urine sample which will be sent for urinalysis, urine cytology, and urine culture.   Patient advised to contact his dentist or ENT to evaluate swollen salivary gland.   I am recommending Dr.Li-Fen Dee of neurology to evaluate memory loss and confusion.   Pt will discontinue tadalafil 5 mg. I prescribed the patient Desmopressin 0.1 mg, one tablet once daily. I informed the patient there is a side effect of dilute blood, we will monitor with blood tests. Blood work today included BMP and serum osmolality. Patient will repeat blood work in two weeks at nearest  lab.   Patient will have a telehealth visit in 3-4 weeks for reassessment on desmopressin and review lab results, sooner if clinically indicated.   Preparation, in person office visit, and coordination of care: 40 minutes.

## 2023-08-13 NOTE — HISTORY OF PRESENT ILLNESS
[FreeTextEntry1] : 9/7/2022: The patient has a past history of lymphoma.  He has been disease-free.  He has a history of benign prostatic hypertrophy with bladder outlet obstruction.  He is maintained on finasteride 5 mg once daily.  He takes alfuzosin 10 mg once daily with food.  He takes trimethoprim 100 mg at bedtime to prevent urinary tract infections.  He has had no infections since her last visit.  His appetite is good does not wake excessively at night to urinate.  He is not troubled by constipation.  He has no chest pains.  He is continuing to do very well.  In anticipation of today's visit he had laboratory work done on September 2.  We reviewed the results.His urinalysis was normal. His testosterone was low at 103.0 and his free testosterone was low at 0.9.  Nonetheless his vitality is good and he is not interested in supplementing his testosterone levels.  We also reviewed his CBC.  He has a stable anemia with a red blood cell count of 3.59/mcL.  His hemoglobin is 10.9 and his hematocrit is 32.7.  He is currently 87 years old.  He has diabetes.  His hemoglobin A1c is 6.6% which is well within the desired range.  His creatinine is 1.35 mg/dL which is within his usual range.  Patient drinks minimal amounts of liquids during the day.  His alkaline phosphatase was normal at 66.  His PSA is 0.10 which is excellent.  It is this low in part because of his low testosterone.  Furthermore his anemia is probably in part due to his low testosterone and slightly elevated creatinine.  Urine culture showed less than 10,000 colony-forming units per milliliter of normal urogenital marquise.  His urine cytology was negative for high-grade urothelial carcinoma.  It showed some reactive and degenerated urothelial cells.  07/05/2023: Mr. JUDI JOHNSON presents today for a follow up. He is accompanied by his wife who is also a patient of mine and being seen today. Is currently disease free from Lymphoma. His wife reports that he was hospitalized for shingles with complications after returning from [facility]. He spent 2 months in a wheel chair after the hospital but is now walking w/o problems. Was diagnosed with diabetes but sugar is under control, sees . Continues to take finasteride 5 mg once daily and trimethoprim 100 mg daily. Has not had any recent UTIs. C/o nocturia x3-4 and intermittent stream.   07/11/2023: Mr. JOHNSON presents today for a follow up audio only telehealth visit for which they gave permission for. The patient was located at home [address] [place] [address] and I was located in my office in [place], [place]. He is accompanied by his wife. His 7/5/23 lab work revealed PSA is excellent at 0.06. Patient has mild anemia which may be due to low testosterone. His CBC results are low as the RBC Count 3.49 M/u L HGB 11.5 g/dL HCT 33.1 %. Current creatinine is normal. Estimated GFR is only slightly below desired amount for age at 57. Alk Phos is normal at 58. Hemoglobin A1c is slightly elevated above the desired range at 7.1% and eAG at 157. Testosterone level is low in just modestly above castrate levels at 0.8 and total at 77.9. His wife reports patient is feeling well and offers no new complaints.  08/07/2023: Mr. JUDI JOHNSON presents today for a follow up. He is accompanied by his wife. He reports his urination is fairly good during the daytime. Voids about 4-5x during the waking period. Nocturia x3-4. Has not found tadalafil 5 mg once daily to be helpful. He does c/o lump on the left side of his face consistent with swelling of the salivary gland. Pt is on digoxin. Has recently been put on a water pill. Wife has noticed the pt has been more forgetful and confused this year.

## 2023-08-13 NOTE — PHYSICAL EXAM
[General Appearance - Well Developed] : well developed [General Appearance - Well Nourished] : well nourished [Normal Appearance] : normal appearance [Well Groomed] : well groomed [General Appearance - In No Acute Distress] : no acute distress [Abdomen Soft] : soft [Abdomen Tenderness] : non-tender [Costovertebral Angle Tenderness] : no ~M costovertebral angle tenderness [Skin Color & Pigmentation] : normal skin color and pigmentation [Edema] : no peripheral edema [] : no respiratory distress [Respiration, Rhythm And Depth] : normal respiratory rhythm and effort [Exaggerated Use Of Accessory Muscles For Inspiration] : no accessory muscle use [Oriented To Time, Place, And Person] : oriented to person, place, and time [Affect] : the affect was normal [Mood] : the mood was normal [Not Anxious] : not anxious [Normal Station and Gait] : the gait and station were normal for the patient's age [No Focal Deficits] : no focal deficits [FreeTextEntry1] : .  Short-term memory, impairment.  Episodes of confusion

## 2023-08-13 NOTE — REVIEW OF SYSTEMS
[Constipation] : constipation [Nocturia] : nocturia [Urinary Stream Starts/Stops] : urinary stream starts and stops [Joint Stiffness] : joint stiffness [Difficulty Walking] : difficulty walking [Easy Bleeding] : a tendency for easy bleeding [Easy Bruising] : a tendency for easy bruising [Memory Lapses Or Loss] : memory loss [Feeling Poorly] : not feeling poorly [Chest Pain] : no chest pain

## 2023-08-13 NOTE — ADDENDUM
[FreeTextEntry1] : This note was authored by Janessa Blankenship working as a scribe for Dr.Gary Stark. I, Dr. Higinio Stark have reviewed the content of this note and confirm it is true and accurate. I personally performed the history and physical examination and made all the decisions 08/07/2023

## 2023-08-20 LAB
ALBUMIN SERPL ELPH-MCNC: 4.4 G/DL
ALP BLD-CCNC: 57 U/L
ANION GAP SERPL CALC-SCNC: 12 MMOL/L
APPEARANCE: CLEAR
BACTERIA UR CULT: NORMAL
BACTERIA: NEGATIVE /HPF
BILIRUBIN URINE: NEGATIVE
BLOOD URINE: NEGATIVE
BUN SERPL-MCNC: 19 MG/DL
CALCIUM SERPL-MCNC: 8.8 MG/DL
CAST: 0 /LPF
CHLORIDE SERPL-SCNC: 101 MMOL/L
CO2 SERPL-SCNC: 27 MMOL/L
COLOR: YELLOW
CREAT SERPL-MCNC: 1.35 MG/DL
EGFR: 51 ML/MIN/1.73M2
EPITHELIAL CELLS: 0 /HPF
ESTIMATED AVERAGE GLUCOSE: 154 MG/DL
GLUCOSE QUALITATIVE U: NEGATIVE MG/DL
GLUCOSE SERPL-MCNC: 119 MG/DL
HBA1C MFR BLD HPLC: 7 %
KETONES URINE: NEGATIVE MG/DL
LEUKOCYTE ESTERASE URINE: NEGATIVE
MICROSCOPIC-UA: NORMAL
NITRITE URINE: NEGATIVE
OSMOLALITY SERPL: 292 MOSMOL/KG
PH URINE: 7
PHOSPHATE SERPL-MCNC: 3.7 MG/DL
POTASSIUM SERPL-SCNC: 4.3 MMOL/L
PROTEIN URINE: NEGATIVE MG/DL
RED BLOOD CELLS URINE: 1 /HPF
SODIUM SERPL-SCNC: 140 MMOL/L
SPECIFIC GRAVITY URINE: 1.01
TESTOST FREE SERPL-MCNC: 0.7 PG/ML
TESTOST SERPL-MCNC: 56.9 NG/DL
URINE CYTOLOGY: NORMAL
UROBILINOGEN URINE: 1 MG/DL
WHITE BLOOD CELLS URINE: 0 /HPF

## 2023-09-25 NOTE — PHYSICAL THERAPY INITIAL EVALUATION ADULT - ADL SKILLS, REHAB EVAL
walking program and will progress slowly pending symptoms. PLAN: See eval  [x] Continue per plan of care [] Alter current plan (see comments above)  [] Plan of care initiated [] Hold pending MD visit [] Discharge  3x/week for 4-6 weeks. Progress note NPV    Electronically signed by:  Mannie Parks PTA    Note: If patient does not return for scheduled/ recommended follow up visits, this note will serve as a discharge from care along with most recent update on progress.
independent

## 2023-12-31 NOTE — PHYSICAL THERAPY INITIAL EVALUATION ADULT - LEVEL OF INDEPENDENCE: STAND/SIT, REHAB EVAL
[address]  pt blood pressure 164/98. pt has no prn orders  [place]  [phone]    independent

## 2024-05-22 NOTE — PHYSICAL THERAPY INITIAL EVALUATION ADULT - PREDICTED DURATION OF THERAPY (DAYS/WKS), PT EVAL
Mildly to Moderately Impaired: difficulty hearing in some environments or speaker may need to increase volume or speak distinctly 4 weeks

## 2024-07-30 ENCOUNTER — RESULT REVIEW (OUTPATIENT)
Age: 89
End: 2024-07-30

## 2024-07-30 ENCOUNTER — APPOINTMENT (OUTPATIENT)
Dept: CT IMAGING | Facility: CLINIC | Age: 89
End: 2024-07-30
Payer: MEDICARE

## 2024-07-30 PROCEDURE — G1004: CPT

## 2024-07-30 PROCEDURE — 70450 CT HEAD/BRAIN W/O DYE: CPT | Mod: 26,ME

## 2024-07-31 ENCOUNTER — APPOINTMENT (OUTPATIENT)
Dept: NEUROSURGERY | Facility: CLINIC | Age: 89
End: 2024-07-31

## 2024-07-31 DIAGNOSIS — S06.5XAA TRAUMATIC SUBDURAL HEMORRHAGE WITH LOSS OF CONSCIOUSNESS STATUS UNKNOWN, INITIAL ENCOUNTER: ICD-10-CM

## 2024-07-31 PROCEDURE — 99442: CPT | Mod: 93

## 2024-08-14 ENCOUNTER — APPOINTMENT (OUTPATIENT)
Dept: CT IMAGING | Facility: CLINIC | Age: 89
End: 2024-08-14
Payer: MEDICARE

## 2024-08-14 PROCEDURE — 70450 CT HEAD/BRAIN W/O DYE: CPT | Mod: 26,MH

## 2024-08-28 ENCOUNTER — APPOINTMENT (OUTPATIENT)
Dept: NEUROSURGERY | Facility: CLINIC | Age: 89
End: 2024-08-28
Payer: MEDICARE

## 2024-08-28 DIAGNOSIS — S06.5XAA TRAUMATIC SUBDURAL HEMORRHAGE WITH LOSS OF CONSCIOUSNESS STATUS UNKNOWN, INITIAL ENCOUNTER: ICD-10-CM

## 2024-08-28 PROCEDURE — 99442: CPT | Mod: 93

## 2024-08-28 NOTE — REASON FOR VISIT
[Home] : at home, [unfilled] , at the time of the visit. [Medical Office: ([facility])___] : at the medical office located in  [Spouse] : spouse [Verbal consent obtained from patient] : the patient, [unfilled] [Follow-Up: _____] : a [unfilled] follow-up visit [FreeTextEntry1] : Reviewed CT head non con done 8/14/24

## 2024-08-28 NOTE — ASSESSMENT
[FreeTextEntry1] : IMPRESSION: 89M with PMH of HTN, HLD, DM 2, CHF, Afib on Eliquis, admitted to [facility] ED 6/30/24 p/w COVID, s/p 2 recent falls without headstrike. CTH showed bilateral subacute/chronic subdural hematoma, minimal mass effect. Eliquis continued when discharged from hospital. Eliquis held after last visit 7/17/24. Half dose Eliquis restarted 7/31/24.  Repeat CT head 8/14/24 shows size of bilateral subdural hematomas relatively unchanged but with increased acute component within it on the left. Prior CT head 7/30/24 under duplicate [employer] chart MRN: [id number]  Wife states patient continues to do clinically well. No new or worsening headaches, unsteadiness, and other symptoms of motor, sensory, speech, or visual abnormalities.  Discussed the options are about the same going forward and that includes continued conservative management with serial scans, minimally invasive endovascular intervention with embolization of middle meningeal artery to reduce chance of increase in subdural accumulation, or open surgical evacuation of SDH. Advised patient and family that there is a possibility it will remain stable and continue to improve with conservative management and serial scans. If there is worsening on interval imaging, would consider embolization of middle meningeal artery to reduce chance of recurrent and increase in subdural accumulation. The risks, benefits, alternatives, complications and personnel associated with the procedure were discussed with the patient and wife in great detail. Explained that there is a 1% risk of complication which includes groin hematoma or pseudoaneurysm, vessel dissection, stroke and blindness.    PLAN: Since it's been 2 weeks since this last CT scan, will repeat another CT head non contrast now If there is further bleeding, will stop Eliquis and consider MMA embolization  If stable, will continue to monitor Cardiologist: Dr. Rosa Qiu Advised to call office or seek medical attention should he have new or worsening symptoms such as headaches, weakness on one side of the body, unsteadiness, etc.

## 2024-08-28 NOTE — REASON FOR VISIT
[Home] : at home, [unfilled] , at the time of the visit. [Medical Office: ([facility])___] : at the medical office located in  [Spouse] : spouse [Verbal consent obtained from patient] : the patient, [unfilled] [Follow-Up: _____] : a [unfilled] follow-up visit [FreeTextEntry1] : Reviewed CT head non con done 8/14/24

## 2024-08-28 NOTE — HISTORY OF PRESENT ILLNESS
[FreeTextEntry1] : WARREN JOHNSON is an 89 year old male with PMH of HTN, HLD, DM 2, CHF, Afib on Eliquis, admitted to [facility] ED 6/30/24 p/w COVID, s/p 2 recent falls without headstrike. CTH showed bilateral subacute/chronic subdural hematoma, minimal mass effect. Eliquis continued.  Today, patient and wife present for follow up phone call to review results of repeat CT head obtained on 8/14/24. Half dose Eliquis restarted at last visit on 7/31/24. No new neurologic complaints since our last visit.

## 2024-08-28 NOTE — ASSESSMENT
[FreeTextEntry1] : IMPRESSION: 89M with PMH of HTN, HLD, DM 2, CHF, Afib on Eliquis, admitted to [facility] ED 6/30/24 p/w COVID, s/p 2 recent falls without headstrike. CTH showed bilateral subacute/chronic subdural hematoma, minimal mass effect. Eliquis continued when discharged from hospital. Eliquis held after last visit 7/17/24. Half dose Eliquis restarted 7/31/24.  Repeat CT head 8/14/24 shows size of bilateral subdural hematomas relatively unchanged but with increased acute component within it on the left. Prior CT head 7/30/24 under duplicate [employer] chart MRN: [id number]  Wife states patient continues to do clinically well. No new or worsening headaches, unsteadiness, and other symptoms of motor, sensory, speech, or visual abnormalities.  Discussed the options are about the same going forward and that includes continued conservative management with serial scans, minimally invasive endovascular intervention with embolization of middle meningeal artery to reduce chance of increase in subdural accumulation, or open surgical evacuation of SDH. Advised patient and family that there is a possibility it will remain stable and continue to improve with conservative management and serial scans. If there is worsening on interval imaging, would consider embolization of middle meningeal artery to reduce chance of recurrent and increase in subdural accumulation. The risks, benefits, alternatives, complications and personnel associated with the procedure were discussed with the patient and wife in great detail. Explained that there is a 1% risk of complication which includes groin hematoma or pseudoaneurysm, vessel dissection, stroke and blindness.    PLAN: Since it's been 2 weeks since this last CT scan, will repeat another CT head non contrast now If there is further bleeding, will stop Eliquis and consider MMA embolization  If stable, will continue to monitor Cardiologist: Dr. Rosa Qiu Advised to call office or seek medical attention should he have new or worsening symptoms such as headaches, weakness on one side of the body, unsteadiness, etc.

## 2024-08-29 ENCOUNTER — APPOINTMENT (OUTPATIENT)
Dept: CT IMAGING | Facility: IMAGING CENTER | Age: 89
End: 2024-08-29
Payer: MEDICARE

## 2024-08-29 PROCEDURE — 70450 CT HEAD/BRAIN W/O DYE: CPT | Mod: 26,MH

## 2024-09-14 ENCOUNTER — APPOINTMENT (OUTPATIENT)
Dept: CT IMAGING | Facility: CLINIC | Age: 89
End: 2024-09-14
Payer: MEDICARE

## 2024-09-14 PROCEDURE — 70450 CT HEAD/BRAIN W/O DYE: CPT | Mod: 26,MH

## 2024-09-18 ENCOUNTER — APPOINTMENT (OUTPATIENT)
Dept: NEUROSURGERY | Facility: CLINIC | Age: 89
End: 2024-09-18
Payer: MEDICARE

## 2024-09-18 DIAGNOSIS — S06.5XAA TRAUMATIC SUBDURAL HEMORRHAGE WITH LOSS OF CONSCIOUSNESS STATUS UNKNOWN, INITIAL ENCOUNTER: ICD-10-CM

## 2024-09-18 PROCEDURE — 99442: CPT | Mod: 93

## 2024-09-18 NOTE — ASSESSMENT
[FreeTextEntry1] : IMPRESSION: 89M with PMH of HTN, HLD, DM 2, CHF, Afib on Eliquis, admitted to [facility] ED 6/30/24 p/w COVID, s/p 2 recent falls without headstrike. CTH showed bilateral subacute/chronic subdural hematoma, minimal mass effect. Eliquis continued when discharged from hospital. Eliquis held 7/17/24. Prior CT head 7/30/24 under duplicate [employer] chart MRN: [id number]. Half dose Eliquis restarted 7/31/24. Full dose Eliquis restarted 8/29/24 with improvement seen on interval scans.  Repeat CT head 9/14/24 shows near complete resolution of right subdural hematoma. Further improvement and decrease in left subdural hematoma now measuring 5mm. No acute hemorrhage.  Wife states patient continues to do clinically well. No new or worsening headaches, unsteadiness, and other symptoms of motor, sensory, speech, or visual abnormalities. Discussed the options are about the same going forward and that includes continued conservative management with serial scans, minimally invasive endovascular intervention with embolization of middle meningeal artery to reduce chance of increase in subdural accumulation, or open surgical evacuation of SDH. Advised patient and family that there is a possibility it will remain stable and continue to improve with conservative management and serial scans. If there is worsening on interval imaging, would consider embolization of middle meningeal artery to reduce chance of recurrent and increase in subdural accumulation. The risks, benefits, alternatives, complications and personnel associated with the procedure were discussed with the patient and wife in great detail. Explained that there is a 1% risk of complication which includes groin hematoma or pseudoaneurysm, vessel dissection, stroke and blindness.   PLAN: Ok to continue full dose Eliquis from a neurosurgical standpoint Repeat CT head non contrast in 1 month Follow up after for review Cardiologist: Dr. Rosa Qiu Advised to call office or seek medical attention should he have new or worsening symptoms such as headaches, weakness on one side of the body, unsteadiness, etc.

## 2024-09-18 NOTE — HISTORY OF PRESENT ILLNESS
[FreeTextEntry1] : WARREN JOHNSON is an 89 year old male with PMH of HTN, HLD, DM 2, CHF, Afib on Eliquis, admitted to [facility] ED 6/30/24 p/w COVID, s/p 2 recent falls without headstrike. CTH showed bilateral subacute/chronic subdural hematoma, minimal mass effect. Eliquis initially continued but then held due to persistent SDH.   Today, patient and wife present for follow up phone call to review results of repeat CT head obtained on 9/14/24. Full dose Eliquis restarted after repeat CT head on 8/29/24. No new neurologic complaints since our last visit.

## 2024-09-18 NOTE — ASSESSMENT
[FreeTextEntry1] : IMPRESSION: 89M with PMH of HTN, HLD, DM 2, CHF, Afib on Eliquis, admitted to [facility] ED 6/30/24 p/w COVID, s/p 2 recent falls without headstrike. CTH showed bilateral subacute/chronic subdural hematoma, minimal mass effect. Eliquis continued when discharged from hospital. Eliquis held 7/17/24. Prior CT head 7/30/24 under duplicate [employer] chart MRN: [id number]. Half dose Eliquis restarted 7/31/24. Full dose Eliquis restarted 8/29/24 with improvement seen on interval scans.  Repeat CT head 9/14/24 shows near complete resolution of right subdural hematoma. Further improvement and decrease in left subdural hematoma now measuring 5mm. No acute hemorrhage.  Wife states patient continues to do clinically well. No new or worsening headaches, unsteadiness, and other symptoms of motor, sensory, speech, or visual abnormalities. Discussed the options are about the same going forward and that includes continued conservative management with serial scans, minimally invasive endovascular intervention with embolization of middle meningeal artery to reduce chance of increase in subdural accumulation, or open surgical evacuation of SDH. Advised patient and family that there is a possibility it will remain stable and continue to improve with conservative management and serial scans. If there is worsening on interval imaging, would consider embolization of middle meningeal artery to reduce chance of recurrent and increase in subdural accumulation. The risks, benefits, alternatives, complications and personnel associated with the procedure were discussed with the patient and wife in great detail. Explained that there is a 1% risk of complication which includes groin hematoma or pseudoaneurysm, vessel dissection, stroke and blindness.   PLAN: Ok to continue full dose Eliquis from a neurosurgical standpoint Repeat CT head non contrast in 1 month Follow up after for review Cardiologist: Dr. Rosa Qiu Advised to call office or seek medical attention should he have new or worsening symptoms such as headaches, weakness on one side of the body, unsteadiness, etc.

## 2024-09-18 NOTE — REASON FOR VISIT
[Home] : at home, [unfilled] , at the time of the visit. [Medical Office: ([facility])___] : at the medical office located in  [Verbal consent obtained from patient] : the patient, [unfilled] [Follow-Up: _____] : a [unfilled] follow-up visit [Spouse] : spouse [FreeTextEntry1] : Reviewed CT head non con done 9/14/24

## 2024-09-18 NOTE — REASON FOR VISIT
[Home] : at home, [unfilled] , at the time of the visit. [Medical Office: ([facility])___] : at the medical office located in  [Verbal consent obtained from patient] : the patient, [unfilled] [Follow-Up: _____] : a [unfilled] follow-up visit [Spouse] : spouse [FreeTextEntry1] : Reviewed CT head non con done 9/14/24

## 2024-09-18 NOTE — HISTORY OF PRESENT ILLNESS
[FreeTextEntry1] : WARREN JOHNSON is an 89 year old male with PMH of HTN, HLD, DM 2, CHF, Afib on Eliquis, admitted to [facility] ED 6/30/24 p/w COVID, s/p 2 recent falls without headstrike. CTH showed bilateral subacute/chronic subdural hematoma, minimal mass effect. Eliquis initially continued but then held due to persistent SDH.   Today, patient and wife present for follow up phone call to review results of repeat CT head obtained on 9/14/24. Full dose Eliquis restarted after repeat CT head on 8/29/24. No new neurologic complaints since our last visit.

## 2025-06-25 NOTE — ED ADULT TRIAGE NOTE - BMI (KG/M2)
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  I wll send in a prescription for Doxycycline antibiotic x 7 days to treat that.  Watch sun exposure and don't take with vitamins, iron etct. Send to [facility].    22.4